# Patient Record
Sex: MALE | Race: WHITE | Employment: OTHER | URBAN - METROPOLITAN AREA
[De-identification: names, ages, dates, MRNs, and addresses within clinical notes are randomized per-mention and may not be internally consistent; named-entity substitution may affect disease eponyms.]

---

## 2021-02-20 ENCOUNTER — HOSPITAL ENCOUNTER (INPATIENT)
Age: 66
LOS: 3 days | Discharge: NURSING, REHAB OR LONG-TERM CARE | DRG: 065 | End: 2021-02-23
Attending: EMERGENCY MEDICINE | Admitting: INTERNAL MEDICINE
Payer: MEDICARE

## 2021-02-20 ENCOUNTER — APPOINTMENT (OUTPATIENT)
Dept: CT IMAGING | Age: 66
DRG: 065 | End: 2021-02-20
Payer: MEDICARE

## 2021-02-20 ENCOUNTER — APPOINTMENT (OUTPATIENT)
Dept: GENERAL RADIOLOGY | Age: 66
DRG: 065 | End: 2021-02-20
Payer: MEDICARE

## 2021-02-20 DIAGNOSIS — I63.231 CEREBROVASCULAR ACCIDENT (CVA) DUE TO STENOSIS OF RIGHT CAROTID ARTERY (HCC): Primary | ICD-10-CM

## 2021-02-20 PROBLEM — I63.9 ACUTE CVA (CEREBROVASCULAR ACCIDENT) (HCC): Status: ACTIVE | Noted: 2021-02-20

## 2021-02-20 LAB
ALBUMIN SERPL-MCNC: 3.9 G/DL (ref 3.5–5.2)
ALP BLD-CCNC: 89 U/L (ref 40–129)
ALT SERPL-CCNC: 28 U/L (ref 0–40)
ANION GAP SERPL CALCULATED.3IONS-SCNC: 10 MMOL/L (ref 7–16)
APTT: 24.9 SEC (ref 24.5–35.1)
AST SERPL-CCNC: 38 U/L (ref 0–39)
BILIRUB SERPL-MCNC: 0.6 MG/DL (ref 0–1.2)
BUN BLDV-MCNC: 13 MG/DL (ref 8–23)
CALCIUM SERPL-MCNC: 8.8 MG/DL (ref 8.6–10.2)
CHLORIDE BLD-SCNC: 104 MMOL/L (ref 98–107)
CO2: 26 MMOL/L (ref 22–29)
CREAT SERPL-MCNC: 1.3 MG/DL (ref 0.7–1.2)
GFR AFRICAN AMERICAN: >60
GFR NON-AFRICAN AMERICAN: 56 ML/MIN/1.73
GLUCOSE BLD-MCNC: 107 MG/DL (ref 74–99)
HCT VFR BLD CALC: 40.2 % (ref 37–54)
HEMOGLOBIN: 13.1 G/DL (ref 12.5–16.5)
INR BLD: 0.9
MCH RBC QN AUTO: 28.9 PG (ref 26–35)
MCHC RBC AUTO-ENTMCNC: 32.6 % (ref 32–34.5)
MCV RBC AUTO: 88.5 FL (ref 80–99.9)
METER GLUCOSE: 110 MG/DL (ref 74–99)
PDW BLD-RTO: 13.6 FL (ref 11.5–15)
PLATELET # BLD: 200 E9/L (ref 130–450)
PMV BLD AUTO: 11.3 FL (ref 7–12)
POTASSIUM SERPL-SCNC: 4 MMOL/L (ref 3.5–5)
PROTHROMBIN TIME: 10.4 SEC (ref 9.3–12.4)
RBC # BLD: 4.54 E12/L (ref 3.8–5.8)
SODIUM BLD-SCNC: 140 MMOL/L (ref 132–146)
TOTAL CK: 155 U/L (ref 20–200)
TOTAL PROTEIN: 7.6 G/DL (ref 6.4–8.3)
TROPONIN: <0.01 NG/ML (ref 0–0.03)
WBC # BLD: 7.2 E9/L (ref 4.5–11.5)

## 2021-02-20 PROCEDURE — 6360000004 HC RX CONTRAST MEDICATION: Performed by: RADIOLOGY

## 2021-02-20 PROCEDURE — 99284 EMERGENCY DEPT VISIT MOD MDM: CPT

## 2021-02-20 PROCEDURE — 4A03X5D MEASUREMENT OF ARTERIAL FLOW, INTRACRANIAL, EXTERNAL APPROACH: ICD-10-PCS | Performed by: PSYCHIATRY & NEUROLOGY

## 2021-02-20 PROCEDURE — 71045 X-RAY EXAM CHEST 1 VIEW: CPT

## 2021-02-20 PROCEDURE — 70498 CT ANGIOGRAPHY NECK: CPT

## 2021-02-20 PROCEDURE — 80053 COMPREHEN METABOLIC PANEL: CPT

## 2021-02-20 PROCEDURE — 0042T CT BRAIN PERFUSION: CPT

## 2021-02-20 PROCEDURE — 85610 PROTHROMBIN TIME: CPT

## 2021-02-20 PROCEDURE — 93005 ELECTROCARDIOGRAM TRACING: CPT | Performed by: EMERGENCY MEDICINE

## 2021-02-20 PROCEDURE — 85027 COMPLETE CBC AUTOMATED: CPT

## 2021-02-20 PROCEDURE — 0042T CT BRAIN PERFUSION: CPT | Performed by: RADIOLOGY

## 2021-02-20 PROCEDURE — 84484 ASSAY OF TROPONIN QUANT: CPT

## 2021-02-20 PROCEDURE — 85730 THROMBOPLASTIN TIME PARTIAL: CPT

## 2021-02-20 PROCEDURE — 2060000000 HC ICU INTERMEDIATE R&B

## 2021-02-20 PROCEDURE — 70498 CT ANGIOGRAPHY NECK: CPT | Performed by: RADIOLOGY

## 2021-02-20 PROCEDURE — 70496 CT ANGIOGRAPHY HEAD: CPT | Performed by: RADIOLOGY

## 2021-02-20 PROCEDURE — 2580000003 HC RX 258: Performed by: EMERGENCY MEDICINE

## 2021-02-20 PROCEDURE — 6370000000 HC RX 637 (ALT 250 FOR IP): Performed by: EMERGENCY MEDICINE

## 2021-02-20 PROCEDURE — 2580000003 HC RX 258: Performed by: RADIOLOGY

## 2021-02-20 PROCEDURE — 70450 CT HEAD/BRAIN W/O DYE: CPT

## 2021-02-20 PROCEDURE — 82962 GLUCOSE BLOOD TEST: CPT

## 2021-02-20 PROCEDURE — 99291 CRITICAL CARE FIRST HOUR: CPT | Performed by: PSYCHIATRY & NEUROLOGY

## 2021-02-20 PROCEDURE — 82550 ASSAY OF CK (CPK): CPT

## 2021-02-20 PROCEDURE — 70496 CT ANGIOGRAPHY HEAD: CPT

## 2021-02-20 RX ORDER — CLOPIDOGREL 300 MG/1
600 TABLET, FILM COATED ORAL ONCE
Status: COMPLETED | OUTPATIENT
Start: 2021-02-20 | End: 2021-02-20

## 2021-02-20 RX ORDER — METOPROLOL SUCCINATE 25 MG/1
25 TABLET, EXTENDED RELEASE ORAL DAILY
Status: ON HOLD | COMMUNITY
End: 2021-03-12 | Stop reason: SDUPTHER

## 2021-02-20 RX ORDER — ASPIRIN 81 MG/1
81 TABLET, CHEWABLE ORAL DAILY
Status: DISCONTINUED | OUTPATIENT
Start: 2021-02-21 | End: 2021-02-23 | Stop reason: HOSPADM

## 2021-02-20 RX ORDER — ACETAMINOPHEN 325 MG/1
650 TABLET ORAL EVERY 6 HOURS PRN
Status: DISCONTINUED | OUTPATIENT
Start: 2021-02-20 | End: 2021-02-23 | Stop reason: HOSPADM

## 2021-02-20 RX ORDER — SPIRONOLACTONE 50 MG/1
50 TABLET, FILM COATED ORAL DAILY
Status: ON HOLD | COMMUNITY
End: 2021-03-12 | Stop reason: SDUPTHER

## 2021-02-20 RX ORDER — CLOPIDOGREL BISULFATE 75 MG/1
75 TABLET ORAL DAILY
Status: DISCONTINUED | OUTPATIENT
Start: 2021-02-21 | End: 2021-02-23 | Stop reason: HOSPADM

## 2021-02-20 RX ORDER — SODIUM CHLORIDE 0.9 % (FLUSH) 0.9 %
10 SYRINGE (ML) INJECTION PRN
Status: DISCONTINUED | OUTPATIENT
Start: 2021-02-20 | End: 2021-02-23 | Stop reason: HOSPADM

## 2021-02-20 RX ORDER — METOPROLOL SUCCINATE 25 MG/1
25 TABLET, EXTENDED RELEASE ORAL DAILY
Status: DISCONTINUED | OUTPATIENT
Start: 2021-02-20 | End: 2021-02-23 | Stop reason: HOSPADM

## 2021-02-20 RX ORDER — LEVOTHYROXINE SODIUM 0.07 MG/1
75 TABLET ORAL DAILY
Status: DISCONTINUED | OUTPATIENT
Start: 2021-02-20 | End: 2021-02-23 | Stop reason: HOSPADM

## 2021-02-20 RX ORDER — LEVOTHYROXINE SODIUM 0.05 MG/1
75 TABLET ORAL DAILY
COMMUNITY

## 2021-02-20 RX ORDER — AMLODIPINE BESYLATE 5 MG/1
5 TABLET ORAL DAILY
Status: ON HOLD | COMMUNITY
End: 2021-03-12 | Stop reason: HOSPADM

## 2021-02-20 RX ORDER — SPIRONOLACTONE 25 MG/1
50 TABLET ORAL DAILY
Status: DISCONTINUED | OUTPATIENT
Start: 2021-02-20 | End: 2021-02-23 | Stop reason: HOSPADM

## 2021-02-20 RX ORDER — CLOPIDOGREL BISULFATE 75 MG/1
75 TABLET ORAL DAILY
COMMUNITY

## 2021-02-20 RX ORDER — SODIUM CHLORIDE 0.9 % (FLUSH) 0.9 %
10 SYRINGE (ML) INJECTION EVERY 12 HOURS SCHEDULED
Status: DISCONTINUED | OUTPATIENT
Start: 2021-02-20 | End: 2021-02-23 | Stop reason: HOSPADM

## 2021-02-20 RX ORDER — POTASSIUM CHLORIDE 20 MEQ/1
30 TABLET, EXTENDED RELEASE ORAL DAILY
COMMUNITY

## 2021-02-20 RX ORDER — SODIUM CHLORIDE 9 MG/ML
INJECTION, SOLUTION INTRAVENOUS CONTINUOUS
Status: DISCONTINUED | OUTPATIENT
Start: 2021-02-20 | End: 2021-02-20

## 2021-02-20 RX ORDER — ATORVASTATIN CALCIUM 40 MG/1
40 TABLET, FILM COATED ORAL EVERY EVENING
COMMUNITY

## 2021-02-20 RX ORDER — PROMETHAZINE HYDROCHLORIDE 25 MG/1
12.5 TABLET ORAL EVERY 6 HOURS PRN
Status: DISCONTINUED | OUTPATIENT
Start: 2021-02-20 | End: 2021-02-23 | Stop reason: HOSPADM

## 2021-02-20 RX ORDER — MESALAMINE 0.38 G/1
1.5 CAPSULE, EXTENDED RELEASE ORAL DAILY
COMMUNITY

## 2021-02-20 RX ORDER — ATORVASTATIN CALCIUM 40 MG/1
40 TABLET, FILM COATED ORAL NIGHTLY
Status: DISCONTINUED | OUTPATIENT
Start: 2021-02-20 | End: 2021-02-23 | Stop reason: HOSPADM

## 2021-02-20 RX ORDER — ONDANSETRON 2 MG/ML
4 INJECTION INTRAMUSCULAR; INTRAVENOUS EVERY 6 HOURS PRN
Status: DISCONTINUED | OUTPATIENT
Start: 2021-02-20 | End: 2021-02-23 | Stop reason: HOSPADM

## 2021-02-20 RX ORDER — ACETAMINOPHEN 650 MG/1
650 SUPPOSITORY RECTAL EVERY 6 HOURS PRN
Status: DISCONTINUED | OUTPATIENT
Start: 2021-02-20 | End: 2021-02-23 | Stop reason: HOSPADM

## 2021-02-20 RX ORDER — ASPIRIN 81 MG/1
324 TABLET, CHEWABLE ORAL ONCE
Status: COMPLETED | OUTPATIENT
Start: 2021-02-20 | End: 2021-02-20

## 2021-02-20 RX ORDER — POLYETHYLENE GLYCOL 3350 17 G/17G
17 POWDER, FOR SOLUTION ORAL DAILY PRN
Status: DISCONTINUED | OUTPATIENT
Start: 2021-02-20 | End: 2021-02-23 | Stop reason: HOSPADM

## 2021-02-20 RX ORDER — AMLODIPINE BESYLATE 5 MG/1
5 TABLET ORAL DAILY
Status: DISCONTINUED | OUTPATIENT
Start: 2021-02-20 | End: 2021-02-23 | Stop reason: HOSPADM

## 2021-02-20 RX ORDER — LOSARTAN POTASSIUM 50 MG/1
50 TABLET ORAL DAILY
Status: DISCONTINUED | OUTPATIENT
Start: 2021-02-20 | End: 2021-02-23 | Stop reason: HOSPADM

## 2021-02-20 RX ORDER — LOSARTAN POTASSIUM 50 MG/1
50 TABLET ORAL DAILY
Status: ON HOLD | COMMUNITY
End: 2021-03-12 | Stop reason: SDUPTHER

## 2021-02-20 RX ADMIN — ASPIRIN 324 MG: 81 TABLET, CHEWABLE ORAL at 12:50

## 2021-02-20 RX ADMIN — SODIUM CHLORIDE: 9 INJECTION, SOLUTION INTRAVENOUS at 12:51

## 2021-02-20 RX ADMIN — CLOPIDOGREL BISULFATE 600 MG: 300 TABLET, FILM COATED ORAL at 12:50

## 2021-02-20 RX ADMIN — IOPAMIDOL 100 ML: 755 INJECTION, SOLUTION INTRAVENOUS at 11:01

## 2021-02-20 RX ADMIN — SODIUM CHLORIDE, PRESERVATIVE FREE 10 ML: 5 INJECTION INTRAVENOUS at 11:01

## 2021-02-20 ASSESSMENT — ENCOUNTER SYMPTOMS
CHEST TIGHTNESS: 0
SHORTNESS OF BREATH: 0
ABDOMINAL PAIN: 0

## 2021-02-20 ASSESSMENT — PAIN SCALES - GENERAL: PAINLEVEL_OUTOF10: 0

## 2021-02-20 NOTE — H&P
THE HEAD WITHOUT CONTRAST  2021 10:59 am TECHNIQUE: CT of the head was performed without the administration of intravenous contrast. Dose modulation, iterative reconstruction, and/or weight based adjustment of the mA/kV was utilized to reduce the radiation dose to as low as reasonably achievable. COMPARISON: None. HISTORY: ORDERING SYSTEM PROVIDED HISTORY: Susan alert, left side weakness, prior stroke also TECHNOLOGIST PROVIDED HISTORY: Has a \"code stroke\" or \"stroke alert\" been called? ->Yes Reason for exam:->Susan alert, left side weakness, prior stroke also What reading provider will be dictating this exam?->CRC FINDINGS: BRAIN/VENTRICLES: There is no acute intracranial hemorrhage, mass effect or midline shift. No abnormal extra-axial fluid collection. The gray-white differentiation is maintained without evidence of an acute infarct. Demonstrated is generalized parenchymal volume loss with prominence of ventricles and sulci. There is some hypodensity in the supratentorial white matter most consistent with chronic small vessel ischemic change. ORBITS: The visualized portion of the orbits demonstrate no acute abnormality. SINUSES: The visualized paranasal sinuses and mastoid air cells demonstrate no acute abnormality. SOFT TISSUES/SKULL:  No acute abnormality of the visualized skull or soft tissues. Parenchymal volume loss which seems disproportionate for age In chronic small vessel ischemic change which which RECOMMENDATIONS: Findings discussed with referring physician following this report    Cta Neck W Contrast    Result Date: 2021  Patient MRN:  [id number] : 1955 Age: 60 years Gender: Male Order Date:  2021 10:59 AM EXAM: CTA NECK W CONTRAST, CTA HEAD W CONTRAST NUMBER OF IMAGES:  [id number] INDICATION:  susan alert susan alert COMPARISON: None Technique: Low-dose CT  acquisition technique included one of following options; 1 . Automated exposure control, 2.  Adjustment of MA and or KV according to patient's size or 3. Use of iterative reconstruction. Contiguous spiral images were obtained in the axial plane, following the administration of intravenous contrast using CT angiographic protocol. Sagittal and coronal images were reconstructed from the axial plane acquisition. Additional MIP reconstructions were presented to aid in the interpretation of this study. Images were obtained from the skull base cranially. There is moderate calcified plaque identified in the vessels compatible with atherosclerotic disease. There is aortic arch and great vessels demonstrate moderate atherosclerotic disease. The right carotid is abnormal. There is no evidence for hemodynamically significant stenosis at the level the proximal internal carotid artery. By NASCET criteria estimated stenosis is 50% or less The left carotid is abnormal. There is no evidence for hemodynamically significant stenosis at the level the proximal internal carotid artery. By NASCET criteria estimated stenosis is 50% or less The right vertebral artery is unremarkable. The left vertebral artery is unremarkable. The basilar artery is unremarkable. The middle cerebral arteries are unremarkable. The anterior cerebral arteries are abnormal. At the level of the distal A3 segment of the right pericallosal artery there appears to be evidence to suggest stenosis. The posterior cerebral arteries are unremarkable. There is evidence for hydrocephalus    1. Moderate atherosclerotic disease . Probable right A 3 stenosis.  2. Estimated stenosis of the proximal right and left internal carotid artery by NASCET criteria is not hemodynamically significant    Ct Brain Perfusion    Result Date: 2021  Patient MRN: [id number] : 1955 Age:  60 years Gender: Male Order Date: 2021 10:59 AM Exam: CT BRAIN PERFUSION Number of Images: 211 views Indication:   katiuska alert, left weakness, prior left weakness now worse katiuska alert, left weakness, prior left weakness now worse Decision Support Exception->Emergency Medical Condition (MA) Comparison: None. Findings: Perfusion images demonstrate symmetric blood volume Blood flow images demonstrate asymmetric blood flow There is a small region of ischemic penumbra in the distribution of the distal right anterior cerebral artery There is no significant core infarct identified. Small region of ischemic penumbra in the distribution of the distal right anterior cerebral artery    Cta Head W Contrast    Result Date: 2021  Patient MRN:  [id number] : 1955 Age: 60 years Gender: Male Order Date:  2021 10:59 AM EXAM: CTA NECK W CONTRAST, CTA HEAD W CONTRAST NUMBER OF IMAGES:  [id number] INDICATION:  katiuska alert katiuska alert COMPARISON: None Technique: Low-dose CT  acquisition technique included one of following options; 1 . Automated exposure control, 2. Adjustment of MA and or KV according to patient's size or 3. Use of iterative reconstruction. Contiguous spiral images were obtained in the axial plane, following the administration of intravenous contrast using CT angiographic protocol. Sagittal and coronal images were reconstructed from the axial plane acquisition. Additional MIP reconstructions were presented to aid in the interpretation of this study. Images were obtained from the skull base cranially. There is moderate calcified plaque identified in the vessels compatible with atherosclerotic disease. There is aortic arch and great vessels demonstrate moderate atherosclerotic disease. The right carotid is abnormal. There is no evidence for hemodynamically significant stenosis at the level the proximal internal carotid artery. By NASCET criteria estimated stenosis is 50% or less The left carotid is abnormal. There is no evidence for hemodynamically significant stenosis at the level the proximal internal carotid artery. By NASCET criteria estimated stenosis is 50% or less The right vertebral artery is unremarkable.  The left vertebral

## 2021-02-20 NOTE — ED PROVIDER NOTES
71-year-old obese male presenting from his daughter's house with left-sided weakness. The called to the house was for weakness and the patient being stuck on the ground. He lives in [place], is visiting his daughter here locally. Try to get out of bed this morning, slid out of bed and was stuck between the bed and the dresser. Could not get up on his own. History of stroke several years ago with residual left-sided weakness. However, today the concern is that the weakness is definitely worse. Slight facial droop on the left-hand side but otherwise fully oriented. He says on Plavix, denies any other blood thinning medications. This was sudden onset, last known well was about 1 AM when he went to bed. This has been persistent, steady, moderate to severe, unspecified duration as it was symptomatic from the time he woke up. Known prior stroke with residual left-sided weakness that is now felt to be worse. History reviewed. No pertinent family history. History reviewed. No pertinent surgical history. Review of Systems   Constitutional: Negative for chills and fever. Respiratory: Negative for chest tightness and shortness of breath. Cardiovascular: Negative for chest pain. Gastrointestinal: Negative for abdominal pain. Neurological: Positive for facial asymmetry, weakness and numbness. Negative for seizures. All other systems reviewed and are negative. Physical Exam  Constitutional:       General: He is not in acute distress. Appearance: He is well-developed. He is obese. HENT:      Head: Normocephalic and atraumatic. Eyes:      Pupils: Pupils are equal, round, and reactive to light. Neck:      Musculoskeletal: Normal range of motion and neck supple. Thyroid: No thyromegaly. Cardiovascular:      Rate and Rhythm: Normal rate and regular rhythm. Pulmonary:      Effort: Pulmonary effort is normal. No respiratory distress.       Breath sounds: Normal breath sounds. No wheezing. Abdominal:      General: There is no distension. Palpations: Abdomen is soft. There is no mass. Tenderness: There is no abdominal tenderness. There is no guarding or rebound. Musculoskeletal: Normal range of motion. General: No tenderness. Skin:     General: Skin is warm and dry. Findings: No erythema. Neurological:      Mental Status: He is alert and oriented to person, place, and time. GCS: GCS eye subscore is 4. GCS verbal subscore is 5. GCS motor subscore is 6. Cranial Nerves: Facial asymmetry present. No cranial nerve deficit. Sensory: Sensory deficit present. Motor: Weakness present. Procedures     MDM     NIH Stroke Scale/Score at time of initial evaluation: 1055  1A: Level of Consciousness 0 - alert; keenly responsive   1B: Ask Month and Age 0 - answers both questions correctly   1C:  Tell Patient To Open and Close Eyes, then Hand  Squeeze 0 - performs both tasks correctly   2: Test Horizontal Extraocular Movements 0 - normal   3: Test Visual Fields 0 - no visual loss   4: Test Facial Palsy 1 - minor paralysis (flattened nasolabial fold, asymmetric on smiling)   5A: Test Left Arm Motor Drift 3 - no effort against gravity, limb falls   5B: Test Right Arm Motor Drift 0 - no drift, limb holds 90 (or 45) degrees for full 10 seconds   6A: Test Left Leg Motor Drift 1 - drift; leg falls by the end of the 5 second period but does not hit bed   6B: Test Right Leg Motor Drift 0 - no drift; leg holds 30 degree position for full 5 seconds   7: Test Limb Ataxia   (FNF/Heel-Shin) 2 - present in two limbs   8: Test Sensation 1 - mild to moderate sensory loss; patient feels pinprick is less sharp or is dull on the affected side; there is a loss of superficial pain with pinprick but patient is aware of being touched    9: Test Language/Aphasia 0 - no aphasia, normal   10: Test Dysarthria 1 - mild to moderate, patient slurs at least some words and at worst, can be understood with some difficulty   11: Test Extinction/Inattention 0 - no abnormality   Total 9          ED Course as of Feb 20 1917   Sat Feb 20, 2021   1130 No acute abnormalities seen in the head CT without contrast.  There is significant chronic microvascular disease out of proportion to his age.    [SO]   56 Spoke with Dr. Noris Hussein, he will discuss the findings with the patient. There is a possibility of a stent in the right carotid based on the significant disease that is present there. Otherwise, intracranially there are no significant acute abnormalities. [SO]   1150 Waiting to hear back from family.    [SO]   [id number] [phone] Patient updated regarding the possible intervention with stent to the right carotid. Setting of the machine so Dr. Noris Hussein can speak with the patient.    [SO]   1200 Dr. Noris Hussein had a long conversation with the patient over the telestroke machine. Patient was given options regarding medical management or a stent in the right carotid to improve blood flow. Patient said he would want medical management and did not want an intervention or procedure at this time. He may choose further intervention once he is back in [place]. Patient will be admitted the hospital.  Per recommendations patient be given aspirin and 6 mg of Plavix, IV fluids and admitted to the hospital.    [SO]   [phone] with Dr. Karen Pinto who will accept the patient to the hospital.    [SO]      ED Course User Index  [SO] Irving Hair, DO       --------------------------------------------- PAST HISTORY ---------------------------------------------  Past Medical History:  has no past medical history on file. Past Surgical History:  has no past surgical history on file. Social History:  reports that he has never smoked. He has never used smokeless tobacco. He reports previous drug use. Family History: family history is not on file. The patients home medications have been reviewed.     Allergies: Patient has no known allergies. -------------------------------------------------- RESULTS -------------------------------------------------    Lab  Results for orders placed or performed during the hospital encounter of 02/20/21   CBC   Result Value Ref Range    WBC 7.2 4.5 - 11.5 E9/L    RBC 4.54 3.80 - 5.80 E12/L    Hemoglobin 13.1 12.5 - 16.5 g/dL    Hematocrit 40.2 37.0 - 54.0 %    MCV 88.5 80.0 - 99.9 fL    MCH 28.9 26.0 - 35.0 pg    MCHC 32.6 32.0 - 34.5 %    RDW 13.6 11.5 - 15.0 fL    Platelets 342 367 - 630 E9/L    MPV 11.3 7.0 - 12.0 fL   Comprehensive metabolic panel   Result Value Ref Range    Sodium 140 132 - 146 mmol/L    Potassium 4.0 3.5 - 5.0 mmol/L    Chloride 104 98 - 107 mmol/L    CO2 26 22 - 29 mmol/L    Anion Gap 10 7 - 16 mmol/L    Glucose 107 (H) 74 - 99 mg/dL    BUN 13 8 - 23 mg/dL    CREATININE 1.3 (H) 0.7 - 1.2 mg/dL    GFR Non-African American 56 >=60 mL/min/1.73    GFR African American >60     Calcium 8.8 8.6 - 10.2 mg/dL    Total Protein 7.6 6.4 - 8.3 g/dL    Albumin 3.9 3.5 - 5.2 g/dL    Total Bilirubin 0.6 0.0 - 1.2 mg/dL    Alkaline Phosphatase 89 40 - 129 U/L    ALT 28 0 - 40 U/L    AST 38 0 - 39 U/L   Troponin   Result Value Ref Range    Troponin <0.01 0.00 - 0.03 ng/mL   CK   Result Value Ref Range    Total  20 - 200 U/L   Protime-INR   Result Value Ref Range    Protime 10.4 9.3 - 12.4 sec    INR 0.9    APTT   Result Value Ref Range    aPTT 24.9 24.5 - 35.1 sec   POCT Glucose   Result Value Ref Range    Meter Glucose 110 (H) 74 - 99 mg/dL   EKG 12 Lead   Result Value Ref Range    Ventricular Rate 68 BPM    Atrial Rate 68 BPM    P-R Interval 160 ms    QRS Duration 86 ms    Q-T Interval 418 ms    QTc Calculation (Bazett) 444 ms    R Axis 2 degrees    T Axis 9 degrees       Radiology  CTA HEAD W CONTRAST   Final Result   1. Moderate atherosclerotic disease . Probable right A 3 stenosis.    2. Estimated stenosis of the proximal right and left internal carotid   artery by NASCET criteria is not hemodynamically significant      CTA NECK W CONTRAST   Final Result   1. Moderate atherosclerotic disease . Probable right A 3 stenosis. 2. Estimated stenosis of the proximal right and left internal carotid   artery by NASCET criteria is not hemodynamically significant      CT BRAIN PERFUSION   Final Result   Small region of ischemic penumbra in the distribution of the distal   right anterior cerebral artery      XR CHEST PORTABLE   Final Result   Normal chest      CT HEAD WO CONTRAST   Final Result   Parenchymal volume loss which seems disproportionate for age   In chronic small vessel ischemic change which which   RECOMMENDATIONS:   Findings discussed with referring physician following this report      MRI BRAIN WO CONTRAST    (Results Pending)       ------------------------- NURSING NOTES AND VITALS REVIEWED ---------------------------  Date / Time Roomed:  2/20/2021 10:49 AM  ED Bed Assignment:  22/22    The nursing notes within the ED encounter and vital signs as below have been reviewed. Patient Vitals for the past 24 hrs:   BP Temp Temp src Pulse Resp SpO2 Height Weight   02/20/21 1730 (!) 167/95   67 19 97 %     02/20/21 1324 (!) 166/92   69 21 98 %     02/20/21 1055 (!) 168/88          02/20/21 1054  97 °F (36.1 °C) Temporal   96 % 5' 9\" (1.753 m) (!) 320 lb (145.2 kg)   02/20/21 1052 (!) 156/103          02/20/21 1051    81 20          Oxygen Saturation Interpretation: Normal      ------------------------------------------ PROGRESS NOTES ------------------------------------------  Re-evaluation(s):    Patients symptoms show no change  Repeat physical examination is not changed    Patients symptoms show no change  Repeat physical examination is not changed    I have spoken with the patient and discussed todays results, in addition to providing specific details for the plan of care and counseling regarding the diagnosis and prognosis.   Their questions are answered at this time and they are agreeable with the plan.      --------------------------------- ADDITIONAL PROVIDER NOTES ---------------------------------  Consultations:  Spoke with Dr. Divya Siu who will consult on the patient. Spoke with admitting team,  They will admit this patient. This patient's ED course included: a personal history and physicial examination, re-evaluation prior to disposition, multiple bedside re-evaluations, IV medications, cardiac monitoring, continuous pulse oximetry and complex medical decision making and emergency management    This patient has remained hemodynamically stable, remained unchanged and been closely monitored during their ED course. Please note that the withdrawal or failure to initiate urgent interventions for this patient would likely result in a life threatening deterioration or permanent disability. Accordingly this patient received 45 minutes of critical care time, excluding separately billable procedures. Systems at risk for deterioration include: neurologic, cardiovascular. Clinical Impression  1. Cerebrovascular accident (CVA) due to stenosis of right carotid artery ([facility] [address]          Disposition  Patient's disposition: Admit to telemetry  Patient's condition is stable.        Wilfredo Amin DO  02/20/21 0090

## 2021-02-20 NOTE — VIRTUAL HEALTH
Consults  Patient Location:  [address] [place] [address] Emergency Department    Provider Location (City/State): [facility]      This virtual visit was ED - telestroke consult, for inpatient care please consult Neurology on call-  for this week      This virtual visit was conducted via interactive/real-time audio/video. Stroke Neurology Consult Note  2/20/2021 12:00 PM  Pt Name: Africa Bansal  MRN: [id number]  YOB: 1955  Date of evaluation: 2/20/2021  Primary Care Physician: No primary care provider on file. Stroke referral received from Tigre Manual, DO based upon patient's presenting stroke like symptomology. Africa Bansal is a 60 y.o. male who presents with acute worsening of left sided weakness. He is from [facility] and he reportedly woke up with these symptoms hence is a DERRICK alert. Last Known to be Well (Stroke Diagnosis)  Date Last Known Well: 02/19/21  Time Last Known Well: 0100    Prior Functional Status(Modified Avery Scale): 2=Slight disability; unable to carry out all previous activities, but able to look after own affairs without assistance    No Known Allergies    Medications  Prior to Admission medications    Not on File         History reviewed. No pertinent past medical history. History reviewed. No pertinent surgical history.   Social History     Socioeconomic History    Marital status: Single     Spouse name: Not on file    Number of children: Not on file    Years of education: Not on file    Highest education level: Not on file   Occupational History    Not on file   Social Needs    Financial resource strain: Not on file    Food insecurity     Worry: Not on file     Inability: Not on file    Transportation needs     Medical: Not on file     Non-medical: Not on file   Tobacco Use    Smoking status: Never Smoker    Smokeless tobacco: Never Used   Substance and Sexual Activity    Alcohol use: Not on file    Drug use: Not Currently    Sexual activity: Not on file   Lifestyle    Physical activity     Days per week: Not on file     Minutes per session: Not on file    Stress: Not on file   Relationships    Social connections     Talks on phone: Not on file     Gets together: Not on file     Attends Taoist service: Not on file     Active member of club or organization: Not on file     Attends meetings of clubs or organizations: Not on file     Relationship status: Not on file    Intimate partner violence     Fear of current or ex partner: Not on file     Emotionally abused: Not on file     Physically abused: Not on file     Forced sexual activity: Not on file   Other Topics Concern    Not on file   Social History Narrative    Not on file       OBJECTIVE  Vitals:    02/20/21 1051 02/20/21 1052 02/20/21 1054 02/20/21 1055   BP:  (!) 156/103  (!) 168/88   Pulse: 81      Resp: 20      Temp:   97 °F (36.1 °C)    TempSrc:   Temporal    SpO2:   96%    Weight:   (!) 320 lb (145.2 kg)    Height:   5' 9\" (1.753 m)      NIH Stroke Scale at time of initial evaluation:        NIH Stroke Scale/Score at time of initial evaluation:    1A: Level of Consciousness 0 - alert; keenly responsive   1B: Ask Month and Age 0 - answers both questions correctly   1C:  Tell Patient To Open and Close Eyes, then Hand  Squeeze 0 - performs both tasks correctly   2: Test Horizontal Extraocular Movements 0 - normal   3: Test Visual Fields 0 - no visual loss   4: Test Facial Palsy 1 - minor paralysis (flattened nasolabial fold, asymmetric on smiling)   5A: Test Left Arm Motor Drift 4 - no movement   5B: Test Right Arm Motor Drift 0 - no drift, limb holds 90 (or 45) degrees for full 10 seconds   6A: Test Left Leg Motor Drift 3 - no effort against gravity; leg falls to bed immediately   6B: Test Right Leg Motor Drift 0 - no drift; leg holds 30 degree position for full 5 seconds   7: Test Limb Ataxia (FNF/Heel-Shin) 0 - absent   8: Test Sensation 0

## 2021-02-21 LAB
ALBUMIN SERPL-MCNC: 4 G/DL (ref 3.5–5.2)
ALP BLD-CCNC: 91 U/L (ref 40–129)
ALT SERPL-CCNC: 26 U/L (ref 0–40)
ANION GAP SERPL CALCULATED.3IONS-SCNC: 12 MMOL/L (ref 7–16)
ANION GAP SERPL CALCULATED.3IONS-SCNC: 16 MMOL/L (ref 7–16)
AST SERPL-CCNC: 28 U/L (ref 0–39)
BILIRUB SERPL-MCNC: 0.8 MG/DL (ref 0–1.2)
BUN BLDV-MCNC: 12 MG/DL (ref 8–23)
BUN BLDV-MCNC: 8 MG/DL (ref 8–23)
CALCIUM SERPL-MCNC: 8.3 MG/DL (ref 8.6–10.2)
CALCIUM SERPL-MCNC: 8.7 MG/DL (ref 8.6–10.2)
CHLORIDE BLD-SCNC: 101 MMOL/L (ref 98–107)
CHLORIDE BLD-SCNC: 99 MMOL/L (ref 98–107)
CHOLESTEROL, TOTAL: 137 MG/DL (ref 0–199)
CO2: 24 MMOL/L (ref 22–29)
CO2: 27 MMOL/L (ref 22–29)
CREAT SERPL-MCNC: 1.3 MG/DL (ref 0.7–1.2)
CREAT SERPL-MCNC: 1.4 MG/DL (ref 0.7–1.2)
EKG ATRIAL RATE: 68 BPM
EKG P-R INTERVAL: 160 MS
EKG Q-T INTERVAL: 418 MS
EKG QRS DURATION: 86 MS
EKG QTC CALCULATION (BAZETT): 444 MS
EKG R AXIS: 2 DEGREES
EKG T AXIS: 9 DEGREES
EKG VENTRICULAR RATE: 68 BPM
GFR AFRICAN AMERICAN: >60
GFR AFRICAN AMERICAN: >60
GFR NON-AFRICAN AMERICAN: 51 ML/MIN/1.73
GFR NON-AFRICAN AMERICAN: 56 ML/MIN/1.73
GLUCOSE BLD-MCNC: 123 MG/DL (ref 74–99)
GLUCOSE BLD-MCNC: 149 MG/DL (ref 74–99)
HBA1C MFR BLD: 5.5 % (ref 4–5.6)
HCT VFR BLD CALC: 39 % (ref 37–54)
HDLC SERPL-MCNC: 38 MG/DL
HEMOGLOBIN: 13 G/DL (ref 12.5–16.5)
LDL CHOLESTEROL CALCULATED: 68 MG/DL (ref 0–99)
MAGNESIUM: 1.9 MG/DL (ref 1.6–2.6)
MCH RBC QN AUTO: 29 PG (ref 26–35)
MCHC RBC AUTO-ENTMCNC: 33.3 % (ref 32–34.5)
MCV RBC AUTO: 87.1 FL (ref 80–99.9)
PDW BLD-RTO: 13.6 FL (ref 11.5–15)
PHOSPHORUS: 2.4 MG/DL (ref 2.5–4.5)
PLATELET # BLD: 187 E9/L (ref 130–450)
PMV BLD AUTO: 11.6 FL (ref 7–12)
POTASSIUM SERPL-SCNC: 2.4 MMOL/L (ref 3.5–5)
POTASSIUM SERPL-SCNC: 2.7 MMOL/L (ref 3.5–5)
RBC # BLD: 4.48 E12/L (ref 3.8–5.8)
SODIUM BLD-SCNC: 139 MMOL/L (ref 132–146)
SODIUM BLD-SCNC: 140 MMOL/L (ref 132–146)
TOTAL PROTEIN: 7.3 G/DL (ref 6.4–8.3)
TRIGL SERPL-MCNC: 155 MG/DL (ref 0–149)
TSH SERPL DL<=0.05 MIU/L-ACNC: 2.17 UIU/ML (ref 0.27–4.2)
VLDLC SERPL CALC-MCNC: 31 MG/DL
WBC # BLD: 8.3 E9/L (ref 4.5–11.5)

## 2021-02-21 PROCEDURE — 84100 ASSAY OF PHOSPHORUS: CPT

## 2021-02-21 PROCEDURE — 6360000002 HC RX W HCPCS: Performed by: INTERNAL MEDICINE

## 2021-02-21 PROCEDURE — 2580000003 HC RX 258: Performed by: INTERNAL MEDICINE

## 2021-02-21 PROCEDURE — 80048 BASIC METABOLIC PNL TOTAL CA: CPT

## 2021-02-21 PROCEDURE — 83735 ASSAY OF MAGNESIUM: CPT

## 2021-02-21 PROCEDURE — 97165 OT EVAL LOW COMPLEX 30 MIN: CPT

## 2021-02-21 PROCEDURE — 6370000000 HC RX 637 (ALT 250 FOR IP): Performed by: INTERNAL MEDICINE

## 2021-02-21 PROCEDURE — 2060000000 HC ICU INTERMEDIATE R&B

## 2021-02-21 PROCEDURE — 84443 ASSAY THYROID STIM HORMONE: CPT

## 2021-02-21 PROCEDURE — 97161 PT EVAL LOW COMPLEX 20 MIN: CPT | Performed by: PHYSICAL THERAPIST

## 2021-02-21 PROCEDURE — 97530 THERAPEUTIC ACTIVITIES: CPT

## 2021-02-21 PROCEDURE — 83036 HEMOGLOBIN GLYCOSYLATED A1C: CPT

## 2021-02-21 PROCEDURE — 85027 COMPLETE CBC AUTOMATED: CPT

## 2021-02-21 PROCEDURE — 93010 ELECTROCARDIOGRAM REPORT: CPT | Performed by: INTERNAL MEDICINE

## 2021-02-21 PROCEDURE — 97530 THERAPEUTIC ACTIVITIES: CPT | Performed by: PHYSICAL THERAPIST

## 2021-02-21 PROCEDURE — 36415 COLL VENOUS BLD VENIPUNCTURE: CPT

## 2021-02-21 PROCEDURE — 99223 1ST HOSP IP/OBS HIGH 75: CPT | Performed by: PHYSICIAN ASSISTANT

## 2021-02-21 PROCEDURE — 80061 LIPID PANEL: CPT

## 2021-02-21 PROCEDURE — 80053 COMPREHEN METABOLIC PANEL: CPT

## 2021-02-21 RX ORDER — POTASSIUM CHLORIDE 7.45 MG/ML
10 INJECTION INTRAVENOUS PRN
Status: DISCONTINUED | OUTPATIENT
Start: 2021-02-21 | End: 2021-02-21

## 2021-02-21 RX ORDER — POTASSIUM CHLORIDE 20 MEQ/1
40 TABLET, EXTENDED RELEASE ORAL PRN
Status: DISCONTINUED | OUTPATIENT
Start: 2021-02-21 | End: 2021-02-21

## 2021-02-21 RX ORDER — MESALAMINE 400 MG/1
800 CAPSULE, DELAYED RELEASE ORAL 3 TIMES DAILY
Status: DISCONTINUED | OUTPATIENT
Start: 2021-02-21 | End: 2021-02-23 | Stop reason: HOSPADM

## 2021-02-21 RX ORDER — POTASSIUM CHLORIDE 20 MEQ/1
40 TABLET, EXTENDED RELEASE ORAL
Status: COMPLETED | OUTPATIENT
Start: 2021-02-21 | End: 2021-02-22

## 2021-02-21 RX ORDER — POTASSIUM CHLORIDE 20 MEQ/1
40 TABLET, EXTENDED RELEASE ORAL
Status: DISCONTINUED | OUTPATIENT
Start: 2021-02-21 | End: 2021-02-21

## 2021-02-21 RX ADMIN — POTASSIUM CHLORIDE 40 MEQ: 1500 TABLET, EXTENDED RELEASE ORAL at 21:43

## 2021-02-21 RX ADMIN — Medication 10 ML: at 01:19

## 2021-02-21 RX ADMIN — ENOXAPARIN SODIUM 40 MG: 40 INJECTION SUBCUTANEOUS at 09:48

## 2021-02-21 RX ADMIN — POTASSIUM CHLORIDE 30 MEQ: 1500 TABLET, EXTENDED RELEASE ORAL at 09:48

## 2021-02-21 RX ADMIN — SERTRALINE 50 MG: 50 TABLET, FILM COATED ORAL at 09:48

## 2021-02-21 RX ADMIN — POTASSIUM CHLORIDE 10 MEQ: 10 INJECTION, SOLUTION INTRAVENOUS at 15:50

## 2021-02-21 RX ADMIN — SPIRONOLACTONE 50 MG: 25 TABLET ORAL at 09:49

## 2021-02-21 RX ADMIN — Medication 10 ML: at 21:47

## 2021-02-21 RX ADMIN — Medication 10 ML: at 09:48

## 2021-02-21 RX ADMIN — POTASSIUM CHLORIDE 10 MEQ: 10 INJECTION, SOLUTION INTRAVENOUS at 18:19

## 2021-02-21 RX ADMIN — MESALAMINE 800 MG: 400 CAPSULE, DELAYED RELEASE ORAL at 21:43

## 2021-02-21 RX ADMIN — LEVOTHYROXINE SODIUM 75 MCG: 0.07 TABLET ORAL at 06:01

## 2021-02-21 RX ADMIN — CLOPIDOGREL 75 MG: 75 TABLET, FILM COATED ORAL at 09:49

## 2021-02-21 RX ADMIN — MESALAMINE 800 MG: 400 CAPSULE, DELAYED RELEASE ORAL at 18:19

## 2021-02-21 RX ADMIN — POTASSIUM CHLORIDE 10 MEQ: 10 INJECTION, SOLUTION INTRAVENOUS at 13:21

## 2021-02-21 RX ADMIN — POTASSIUM CHLORIDE 10 MEQ: 10 INJECTION, SOLUTION INTRAVENOUS at 14:45

## 2021-02-21 RX ADMIN — POTASSIUM CHLORIDE 10 MEQ: 10 INJECTION, SOLUTION INTRAVENOUS at 16:58

## 2021-02-21 RX ADMIN — ATORVASTATIN CALCIUM 40 MG: 40 TABLET, FILM COATED ORAL at 21:43

## 2021-02-21 RX ADMIN — ASPIRIN 81 MG: 81 TABLET, CHEWABLE ORAL at 09:49

## 2021-02-21 ASSESSMENT — PAIN SCALES - GENERAL: PAINLEVEL_OUTOF10: 0

## 2021-02-21 NOTE — PROGRESS NOTES
Hospitalist Progress Note      SYNOPSIS: Patient admitted on 2021 presented to the ER with complaint of left-sided weakness. He fell out of bed and was stuck on the ground. He normally lives in [place] but is visiting his daughter locally. He could not get up this morning and could not get up on his own. He does have a history of stroke several years ago. He complains of worse weakness of the left upper extremity. Last known well was the night prior when he went to bed. He normally is on Plavix at home for prior stroke. Perfusion CT showed small region of ischemic penumbra in the distribution of the distal right anterior cerebral artery. CT of the head also showing parenchymal volume loss which seems disproportionate for age and small vessel changes. Neurology consulted from the ER. tPA was not given due to being out of the timeframe. Carotid reperfusion therapy was discussed with the patient along with medical management and he chose medical management and wished to go back to [place] for care there. He received aspirin and Plavix load       SUBJECTIVE:  Stable overnight. No other overnight issues reported. Patient seen and examined  Records reviewed. Still with flaccid L arm  Did poorly with PT 10/24  He originally wanted to treat this stroke medically only, no procedures. Now he wants to hear about procedure with neuro interventionalist. He is out of time window  I have told him deficits may not improve      Temp (24hrs), Av.1 °F (36.7 °C), Min:97.2 °F (36.2 °C), Max:99.1 °F (37.3 °C)    DIET: DIET GENERAL;  CODE: Full Code    Intake/Output Summary (Last 24 hours) at 2021 1236  Last data filed at 2021 1134  Gross per 24 hour   Intake    Output 1000 ml   Net -1000 ml       Review of Systems  All bolded are positive; please see HPI  General:  Fever, chills, diaphoresis, fatigue, malaise, night sweats, weight loss  Psychological:  Anxiety, disorientation, hallucinations. ENT:  Epistaxis, headaches, vertigo, visual changes. Cardiovascular:  Chest pain, irregular heartbeats, palpitations, paroxysmal nocturnal dyspnea. Respiratory:  Shortness of breath, coughing, sputum production, hemoptysis, wheezing, orthopnea. Gastrointestinal:  Nausea, vomiting, diarrhea, heartburn, constipation, abdominal pain, hematemesis, hematochezia, melena, acholic stools  Genito-Urinary:  Dysuria, urgency, frequency, hematuria  Musculoskeletal:  Joint pain, joint stiffness, joint swelling, muscle pain  Neurology:  Headache, focal neurological deficits, weakness, numbness, paresthesia  Derm:  Rashes, ulcers, excoriations, bruising  Extremities:  Decreased ROM, peripheral edema, mottling      OBJECTIVE:    BP (!) 187/94   Pulse 70   Temp 97.2 °F (36.2 °C) (Temporal)   Resp 20   Ht 5' 9\" (1.753 m)   Wt (!) 318 lb (144.2 kg)   SpO2 91%   BMI 46.96 kg/m²     General Appearance:  awake, alert, and oriented to person, place, time, and purpose; appears stated age and cooperative; no apparent distress no labored breathing  HEENT:  NCAT; PERRL; conjunctiva pink, sclera clear minor facial droop  Neck:  no adenopathy, bruit, JVD, tenderness, masses, or nodules; supple, symmetrical, trachea midline, thyroid not enlarged  Lung:  clear to auscultation bilaterally; no use of accessory muscles; no rhonchi, rales, or crackles  Heart:  regular rate and regular rhythm without murmur, rub, or gallop  Abdomen:  soft, nontender, nondistended; normoactive bowel sounds; no organomegaly  Extremities:  extremities normal, atraumatic, no cyanosis or edema   Musculokeletal:  no joint swelling, no muscle tenderness. ROM normal in all joints of extremities. Neurologic:  mental status A&Ox3, thought content appropriate; CN II-XII grossly intact; sensation intact,  ; no slurred speech left upper extremity flaccid.   Left lower leg weak cannot hold against gravity    ASSESSMENT and PLAN: · Acute ischemic CVA- Plavix. Statin. MRI brain was ordered however he is unable to fit. Echocardiogram. Lipid panel reviewed. Neuro checks q4 hours. Bedside swallow evaluation Speech therapy consult. Did poorly with PT 10/24. DVT prophylaxis. Permissive hypertension unless BP >220/120 or pt symptomatic (home medications being restarted slowing). Consult neurology. Patient did not wish to have reperfusion therapy, chose medical management, wants to go back to [place] for further workup. However now he is asking about procedure after he did poorly with PT.   · Non insulin dependent diabetes mellitus-Metformin on hold due to intolerance. Continue to monitor blood glucose. · Hypertension  · Hyperlipidemia  · Hypothyroidism  · Hypokalemia- patient has had intermittent diarrhea over the past 2 weeks. Replace and monitor.  Repeat BMP this afternoon  · History of oral cancer and radiation-this sometimes causes swallowing dysfunction         DISPOSITION: Continue current plan of care    Medications:  REVIEWED DAILY    Infusion Medications   Scheduled Medications    aspirin  81 mg Oral Daily    clopidogrel  75 mg Oral Daily    atorvastatin  40 mg Oral Nightly    sodium chloride flush  10 mL Intravenous 2 times per day    enoxaparin  40 mg Subcutaneous Daily    amLODIPine  5 mg Oral Daily    levothyroxine  75 mcg Oral Daily    [Held by provider] losartan  50 mg Oral Daily    [Held by provider] metoprolol succinate  25 mg Oral Daily    sertraline  50 mg Oral Daily    spironolactone  50 mg Oral Daily    potassium chloride  30 mEq Oral Daily     PRN Meds: potassium chloride **OR** potassium alternative oral replacement **OR** potassium chloride, sodium chloride flush, perflutren lipid microspheres, sodium chloride flush, promethazine **OR** ondansetron, polyethylene glycol, acetaminophen **OR** acetaminophen    Labs:     Recent Labs     02/20/21  1057 02/21/21  0938   WBC 7.2 8.3   HGB 13.1 13.0   HCT 40.2 39.0  187       Recent Labs     02/20/21  1057 02/21/21  0938    139   K 4.0 2.4*    99   CO2 26 24   BUN 13 8   CREATININE 1.3* 1.3*   CALCIUM 8.8 8.7   PHOS  --  2.4*       Recent Labs     02/20/21  1057 02/21/21  0938   PROT 7.6 7.3   ALKPHOS 89 91   ALT 28 26   AST 38 28   BILITOT 0.6 0.8       Recent Labs     02/20/21  1057   INR 0.9       Recent Labs     02/20/21  1057   CKTOTAL 155   TROPONINI <0.01       Chronic labs:    Lab Results   Component Value Date    CHOL 137 02/21/2021    TRIG 155 (H) 02/21/2021    HDL 38 02/21/2021    LDLCALC 68 02/21/2021    TSH 2.170 02/21/2021    INR 0.9 02/20/2021    LABA1C 5.5 02/21/2021       Radiology: REVIEWED DAILY    +++++++++++++++++++++++++++++++++++++++++++++++++  Srikanth Parkinson DO  [employer] - [facility], [place]  +++++++++++++++++++++++++++++++++++++++++++++++++  NOTE: This report was transcribed using voice recognition software. Every effort was made to ensure accuracy; however, inadvertent computerized transcription errors may be present.

## 2021-02-21 NOTE — PROGRESS NOTES
OCCUPATIONAL THERAPY INITIAL EVALUATION      Date:2021  Patient Name: Josette Osborne  MRN: [id number]  : 1955  [facility]    Modified Marinette Scale   Score     Description  0             No symptoms  1             No significant disability despite symptoms  2             Slight disability; able to look after own affairs  3             Moderate disability; able to ambulate without assist/ requires assist with ADLs  4             Moderate/Severe disability;requires assist to ambulate/assist with ADLs  5             Severe disability;bedridden/incontinent   6               Score:   4    Evaluating OT: Kath Arce OTR/L  Referring Provider: Leticia Kate DO    AM-PAC Daily Activity Raw Score:   Recommended Adaptive Equipment: to be determined     Comments: Based on patient's functional performance as stated below and level of assistance needed prior to admission, this therapist believes that the patient would benefit from intensive skilled OT following hospital stay in an effort to increase safety, functional independence, and quality of life. Diagnosis: Acute CVA (cerebrovascular accident) [facility]) [I63.9]    Pertinent Medical History: History reviewed. No pertinent past medical history. Precautions:  Falls, L side weakness, contact isolation (c-diff r/o)     Home Living: Pt lives wife in a 1 story with 1 step(s) to enter and no rail(s); bed/bath on main floor; laundry in basement- wife completes  Bathroom setup: tub shower, safety bar  Equipment owned: cane    Prior Level of Function: independent with ADLs and with IADLs; using SPC for ambulation. 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 Latent, poor eye contact throughout session. Would benefit from speech consult. Demo's poor retention of learned skills, requires multiple cues for posture during session.    Memory: G   Sequencing: F Problem solving: F-   Judgement/safety: F     Functional Assessment:   Initial Eval Status  Date: 2/21/21 Treatment Status  Date: STG=LTG (~5-14  days)     Feeding Min A  simulated     Set up A   Grooming Mod A  simulated    Set up A while seated/standing at sink    UB Dressing Mod A  simulated    SBA   LB Dressing DEP  To cherri socks, poor sitting balance EOB for self-attempt    Mod A   Bathing Max A  Simulated    Mod A   Toileting Max A  simulated    Min A   Bed Mobility  Rolling: NT  Supine to sit: Mod A x2  Sit to supine: Mod A  Min A   Functional Transfers Sit to stand: Mod A  Stand to sit: Mod A  Cues for hand placement and body alignment  Min A   Functional Mobility Mod A ww  For side steps at EOB  Min A ww for in-room distance   Balance Sitting:     Static:  Min A    Dynamic: Mod A  Mod/max cues for midline posture  Standing:  Mod A ww     Endurance/Activity Tolerance fair  good   Visual/  Perceptual Glasses: ?  Vision appears WFL, continue to assess              Hand dominance: R  UE ROM: RUE: AROM WFL  LUE: AAROM WFL  Strength: RUE: grossly 4+/5     LUE: shoulder shrug 2/5, shoulder flexion 1/5, elbow flexion 1/5, elbow extension 0/5, wrist extension 0/5, wrist flexion 1/5   Strength: WFL R, diminished L  Fine Motor Coordination: WFL R, impaired L    Hearing: WFL  Sensation: No c/o numbness or tingling  Tone: Flexor tone noted in LUE  Edema: globally edematous Comments: Upon arrival patient supine with HOB slightly elevated- cognition as above. Patient seen in collaboration with PT to increase patient safety d/t patient habitus, current deficits. Bed mobility, functional transfers, sitting balance, midline awareness, toileting, feeding, positioning, addressed. Patient education provided regarding self ROM of LUE to decrease tone and to maintain flexibility. Additional education provided regarding positioning of LUE for joint protection- reinforcement likely needed. After session, patient long sitting with all devices within reach, all lines and tubes intact. Pt required cues and education as noted above for safe facilitation and completion of tasks. Therapist provided skilled monitoring of patient's response during treatment session. Prior to and at the end of session, environmental modifications/line management completed for patients safety and efficiency of treatment session. Overall, patient demonstrates significant difficulties with completion of BADLs and IADLs. Factors contributing to these difficulties include L sided weakness, impaired cognition, impaired sitting balance, decreased endurance, and generalized weakness. As noted above, patient likely to benefit from further OT intervention to increase independence, safety, and overall quality of life. Treatment:   · Bed mobility: Facilitated bed mobility with cues for proper body mechanics and sequencing to prepare for ADL completion. · Functional transfers: Facilitated transfers from various surfaces with cues for body alignment, safety and hand placement. · Therapeutic Exercises: L UE AAROM completed at all levels to maintain strength/flexibility and to decrease edema/contractures to enhance ADL completion. · Postural Balance: Sitting balance retraining to improve righting reactions with postural changes during ADLs. []Splinting/positioning needs to maintain joint/skin integrity and prevent contractures  [x]Therapeutic activity to improve functional performance during ADLs. [x]Therapeutic exercise to improve tolerance and functional strength for ADLs   [x]Balance retraining/tolerance tasks for facilitation of postural control with dynamic challenges during ADLs . [x]Neuromuscular re-education: facilitation of righting/equilibrium reactions, midline orientation, scapular stability/mobility, Normalization muscle     tone and facilitation active functional movement/Attention                         [x]Delirium prevention/treatment    [x]Positioning to improve functional independence  []Other:       Rehab Potential: Good for established goals     Patient / Family Goal: None stated      Patient and/or family were instructed on functional diagnosis, prognosis/goals and OT plan of care. Demonstrated fair understanding. Eval Complexity: Low      Time In: 1010  Time Out: 1032  Total Time: 22 minutes    Min Units   OT Eval Low 97165  X 1    OT Eval Medium 39599      OT Eval High 64644       OT Re-Eval [id number]       Therapeutic Ex 41819       Therapeutic Activities 84094  8 1    ADL/Self Care 91431       Orthotic Management 73984       Neuro Re-Ed 99532       Non-Billable Time          Evaluation Time includes thorough review of current medical information, gathering information on past medical history/social history and prior level of function, completion of standardized testing/informal observation of tasks, assessment of data and education on plan of care and goals.     Maria Ines Angel, OTR/L  [id number]

## 2021-02-21 NOTE — CONSULTS
Historical Provider, MD   spironolactone (ALDACTONE) 50 MG tablet Take 50 mg by mouth daily   Yes Historical Provider, MD   clopidogrel (PLAVIX) 75 MG tablet Take 75 mg by mouth daily   Yes Historical Provider, MD   metoprolol succinate (TOPROL XL) 25 MG extended release tablet Take 25 mg by mouth daily   Yes Historical Provider, MD   sertraline (ZOLOFT) 50 MG tablet Take 50 mg by mouth daily   Yes Historical Provider, MD   metFORMIN (GLUCOPHAGE) 500 MG tablet Take 500 mg by mouth Daily with supper   Yes Historical Provider, MD   levothyroxine (SYNTHROID) 50 MCG tablet Take 75 mcg by mouth Daily   Yes Historical Provider, MD   Multiple Vitamins-Minerals (CENTRUM SILVER PO) Take 1 tablet by mouth daily   Yes Historical Provider, MD   mesalamine (APRISO) 0.375 g extended release capsule Take 1.5 g by mouth daily   Yes Historical Provider, MD       Social History:     Denies ETOH, tobacco, or illicit drugs    Review of Systems:     No chest pain or palpitations  No SOB  No vertigo, lightheadedness or loss of consciousness  No falls, tripping or stumbling  No incontinence of bowels or bladder  No itching or bruising appreciated  No numbness, tingling   + L side weakness     ROS is otherwise negative     Family History:     History reviewed. No pertinent family history. Objective:     BP (!) 187/94   Pulse 70   Temp 97.2 °F (36.2 °C) (Temporal)   Resp 20   Ht 5' 9\" (1.753 m)   Wt (!) 318 lb (144.2 kg)   SpO2 91%   BMI 46.96 kg/m²     General appearance: alert, appears stated age, cooperative and no distress  Head: normocephalic, without obvious abnormality, atraumatic  Eyes: conjunctivae/corneas clear.  .  Neck: no adenopathy, no carotid bruit, supple  Lungs: clear to auscultation bilaterally  Heart: regular rate and rhythm, S1, S2 normal, no murmur, click, rub or gallop  Extremities: normal, atraumatic, no cyanosis or edema  Pulses: 2+ and symmetric  Skin: color, texture, turgor normal---no rashes or lesions Mental Status: alert, oriented, thought content appropriate    Appropriate attention/concentration  Intact fundus of knowledge  Repetition intact  Memories intact    Speech: Mild dysarthria  Language: no aphasias    Cranial Nerves:  I: smell    II: visual acuity     II: visual fields Full    II: pupils MIRIAN   III,VII: ptosis None   III,IV,VI: extraocular muscles  EOMI without nystagmus    V: mastication Normal   V: facial light touch sensation  Decreased L face    V,VII: corneal reflex     VII: facial muscle function - upper  Normal   VII: facial muscle function - lower Normal   VIII: hearing Normal   IX: soft palate elevation  Normal   IX,X: gag reflex    XI: trapezius strength  5/5 R   XI: sternocleidomastoid strength 5/5   XI: neck extension strength  5/5   XII: tongue strength  Normal     Motor:  5/5 on R  1-2/5 L proximal; 3/5 L ; 5-/5 LLE   Obese bulk and normal tone   No drift  No abnormal movements    Sensory:  LT decreased L arm, intact legs   Vibration mild to moderately decreased ankles b/l     Coordination:   FN, FFM and ZAYRA on R; unable on L r/t weakness   HS normal    DTR:     No Babinskis  No Florentino's     No other pathological reflexes    Laboratory/Radiology:     CBC with Differential:    Lab Results   Component Value Date    WBC 8.3 02/21/2021    RBC 4.48 02/21/2021    HGB 13.0 02/21/2021    HCT 39.0 02/21/2021     02/21/2021    MCV 87.1 02/21/2021    MCH 29.0 02/21/2021    MCHC 33.3 02/21/2021    RDW 13.6 02/21/2021     CMP:    Lab Results   Component Value Date     02/21/2021    K 2.4 02/21/2021    CL 99 02/21/2021    CO2 24 02/21/2021    BUN 8 02/21/2021    CREATININE 1.3 02/21/2021    GFRAA >60 02/21/2021    LABGLOM 56 02/21/2021    GLUCOSE 149 02/21/2021    PROT 7.3 02/21/2021    LABALBU 4.0 02/21/2021    CALCIUM 8.7 02/21/2021    BILITOT 0.8 02/21/2021    ALKPHOS 91 02/21/2021    AST 28 02/21/2021    ALT 26 02/21/2021     HgBA1c:    Lab Results   Component Value Date

## 2021-02-21 NOTE — PROGRESS NOTES
Treatment:  Patient practiced and was instructed in the following treatment:    ? Pt performed functional activities as stated above. Pt sat on the side of the bed approx 5 minutes. Demonstrated LOB and lean both posteriorly and to the left, able to correct temporarily with cuing. Ataxia and decreased awareness of L LE noted. Required cuing for placement of L LE with standing and gait. Pt returned to bed upon completion of evaluation. Pt's/ family goals   1. To return to prior functional level    Patient and or family understand(s) diagnosis, prognosis, and plan of care. Yes     PLAN OF CARE:    Current Treatment Recommendations     [x] Strengthening     [x] ROM   [x] Balance Training   [x] Endurance Training   [x] Transfer Training   [x] Gait Training   [x] Stair Training   [x] Positioning   [x] Safety and Education Training   [x] Patient/Caregiver Education   [x] HEP  [] Other     Frequency of treatments: 2-5x/week x 1-2 weeks. Time in  1010  Time out  1030    Total Treatment Time  10 minutes     Evaluation Time includes thorough review of current medical information, gathering information on past medical history/social history and prior level of function, completion of standardized testing/informal observation of tasks, assessment of data and education on plan of care and goals.     CPT codes:  [x] Low Complexity PT evaluation 70211  [] Moderate Complexity PT evaluation 96464  [] High Complexity PT evaluation 32246  [] PT Re-evaluation 29463  [] Gait training 00807  minutes  [] Manual therapy 29449  minutes  [x] Therapeutic activities 23277 10 minutes  [] Therapeutic exercises 29319  minutes  [] Neuromuscular reeducation 28586  minutes     Megan Cartagena [place] [id number]

## 2021-02-22 ENCOUNTER — APPOINTMENT (OUTPATIENT)
Dept: MRI IMAGING | Age: 66
DRG: 065 | End: 2021-02-22
Payer: MEDICARE

## 2021-02-22 LAB
ALBUMIN SERPL-MCNC: 3.8 G/DL (ref 3.5–5.2)
ALP BLD-CCNC: 89 U/L (ref 40–129)
ALT SERPL-CCNC: 22 U/L (ref 0–40)
ANION GAP SERPL CALCULATED.3IONS-SCNC: 11 MMOL/L (ref 7–16)
ANION GAP SERPL CALCULATED.3IONS-SCNC: 11 MMOL/L (ref 7–16)
AST SERPL-CCNC: 28 U/L (ref 0–39)
BILIRUB SERPL-MCNC: 0.6 MG/DL (ref 0–1.2)
BUN BLDV-MCNC: 10 MG/DL (ref 8–23)
BUN BLDV-MCNC: 8 MG/DL (ref 8–23)
CALCIUM SERPL-MCNC: 8.4 MG/DL (ref 8.6–10.2)
CALCIUM SERPL-MCNC: 8.9 MG/DL (ref 8.6–10.2)
CHLORIDE BLD-SCNC: 105 MMOL/L (ref 98–107)
CHLORIDE BLD-SCNC: 105 MMOL/L (ref 98–107)
CO2: 26 MMOL/L (ref 22–29)
CO2: 26 MMOL/L (ref 22–29)
CREAT SERPL-MCNC: 1.1 MG/DL (ref 0.7–1.2)
CREAT SERPL-MCNC: 1.3 MG/DL (ref 0.7–1.2)
GFR AFRICAN AMERICAN: >60
GFR AFRICAN AMERICAN: >60
GFR NON-AFRICAN AMERICAN: 56 ML/MIN/1.73
GFR NON-AFRICAN AMERICAN: >60 ML/MIN/1.73
GLUCOSE BLD-MCNC: 104 MG/DL (ref 74–99)
GLUCOSE BLD-MCNC: 120 MG/DL (ref 74–99)
HCT VFR BLD CALC: 38.9 % (ref 37–54)
HEMOGLOBIN: 12.5 G/DL (ref 12.5–16.5)
MCH RBC QN AUTO: 28.7 PG (ref 26–35)
MCHC RBC AUTO-ENTMCNC: 32.1 % (ref 32–34.5)
MCV RBC AUTO: 89.2 FL (ref 80–99.9)
PDW BLD-RTO: 13.6 FL (ref 11.5–15)
PLATELET # BLD: 175 E9/L (ref 130–450)
PMV BLD AUTO: 11.8 FL (ref 7–12)
POTASSIUM SERPL-SCNC: 2.6 MMOL/L (ref 3.5–5)
POTASSIUM SERPL-SCNC: 3 MMOL/L (ref 3.5–5)
RBC # BLD: 4.36 E12/L (ref 3.8–5.8)
SODIUM BLD-SCNC: 142 MMOL/L (ref 132–146)
SODIUM BLD-SCNC: 142 MMOL/L (ref 132–146)
TOTAL PROTEIN: 7.3 G/DL (ref 6.4–8.3)
WBC # BLD: 6.6 E9/L (ref 4.5–11.5)

## 2021-02-22 PROCEDURE — 70551 MRI BRAIN STEM W/O DYE: CPT

## 2021-02-22 PROCEDURE — 2580000003 HC RX 258: Performed by: INTERNAL MEDICINE

## 2021-02-22 PROCEDURE — 92526 ORAL FUNCTION THERAPY: CPT | Performed by: SPEECH-LANGUAGE PATHOLOGIST

## 2021-02-22 PROCEDURE — 6360000002 HC RX W HCPCS: Performed by: INTERNAL MEDICINE

## 2021-02-22 PROCEDURE — 6370000000 HC RX 637 (ALT 250 FOR IP): Performed by: EMERGENCY MEDICINE

## 2021-02-22 PROCEDURE — 80053 COMPREHEN METABOLIC PANEL: CPT

## 2021-02-22 PROCEDURE — 36415 COLL VENOUS BLD VENIPUNCTURE: CPT

## 2021-02-22 PROCEDURE — 2060000000 HC ICU INTERMEDIATE R&B

## 2021-02-22 PROCEDURE — 85027 COMPLETE CBC AUTOMATED: CPT

## 2021-02-22 PROCEDURE — 92610 EVALUATE SWALLOWING FUNCTION: CPT | Performed by: SPEECH-LANGUAGE PATHOLOGIST

## 2021-02-22 PROCEDURE — 6370000000 HC RX 637 (ALT 250 FOR IP): Performed by: INTERNAL MEDICINE

## 2021-02-22 PROCEDURE — 99232 SBSQ HOSP IP/OBS MODERATE 35: CPT | Performed by: NURSE PRACTITIONER

## 2021-02-22 RX ORDER — POTASSIUM CHLORIDE 20 MEQ/1
40 TABLET, EXTENDED RELEASE ORAL ONCE
Status: COMPLETED | OUTPATIENT
Start: 2021-02-22 | End: 2021-02-22

## 2021-02-22 RX ADMIN — Medication 10 ML: at 20:21

## 2021-02-22 RX ADMIN — MESALAMINE 800 MG: 400 CAPSULE, DELAYED RELEASE ORAL at 14:15

## 2021-02-22 RX ADMIN — POTASSIUM CHLORIDE: 2 INJECTION, SOLUTION, CONCENTRATE INTRAVENOUS at 11:42

## 2021-02-22 RX ADMIN — SPIRONOLACTONE 50 MG: 25 TABLET ORAL at 08:43

## 2021-02-22 RX ADMIN — POTASSIUM CHLORIDE 40 MEQ: 1500 TABLET, EXTENDED RELEASE ORAL at 17:52

## 2021-02-22 RX ADMIN — CLOPIDOGREL 75 MG: 75 TABLET, FILM COATED ORAL at 08:43

## 2021-02-22 RX ADMIN — AMLODIPINE BESYLATE 5 MG: 5 TABLET ORAL at 08:43

## 2021-02-22 RX ADMIN — MESALAMINE 800 MG: 400 CAPSULE, DELAYED RELEASE ORAL at 08:43

## 2021-02-22 RX ADMIN — ENOXAPARIN SODIUM 40 MG: 40 INJECTION SUBCUTANEOUS at 08:43

## 2021-02-22 RX ADMIN — MESALAMINE 800 MG: 400 CAPSULE, DELAYED RELEASE ORAL at 20:21

## 2021-02-22 RX ADMIN — SERTRALINE 50 MG: 50 TABLET, FILM COATED ORAL at 08:43

## 2021-02-22 RX ADMIN — ATORVASTATIN CALCIUM 40 MG: 40 TABLET, FILM COATED ORAL at 20:21

## 2021-02-22 RX ADMIN — POTASSIUM CHLORIDE 40 MEQ: 1500 TABLET, EXTENDED RELEASE ORAL at 12:33

## 2021-02-22 RX ADMIN — POTASSIUM CHLORIDE 40 MEQ: 20 TABLET, EXTENDED RELEASE ORAL at 02:00

## 2021-02-22 RX ADMIN — Medication 10 ML: at 08:43

## 2021-02-22 RX ADMIN — LEVOTHYROXINE SODIUM 75 MCG: 0.07 TABLET ORAL at 06:21

## 2021-02-22 RX ADMIN — ASPIRIN 81 MG: 81 TABLET, CHEWABLE ORAL at 08:42

## 2021-02-22 RX ADMIN — POTASSIUM CHLORIDE 40 MEQ: 1500 TABLET, EXTENDED RELEASE ORAL at 08:42

## 2021-02-22 ASSESSMENT — PAIN SCALES - GENERAL
PAINLEVEL_OUTOF10: 0
PAINLEVEL_OUTOF10: 0

## 2021-02-22 NOTE — PROGRESS NOTES
soft, non-tender  Extremities:  atraumatic, no cyanosis or edema  Skin: color, texture, turgor normal---no rashes or lesions      Mental Status: Alert, oriented, thought content appropriate     Appropriate attention/concentration  Intact fundus of knowledge  Repetition intact  Intact memories      Speech: no dysarthria  Language: no aphasias    Cranial Nerves:  I: smell NA   II: visual acuity  NA   II: visual fields Full to confrontation   II: pupils MIRIAN   III,VII: ptosis None   III,IV,VI: extraocular muscles  EOMI without nystagmus   V: mastication Normal   V: facial light touch sensation  Normal   V,VII: corneal reflex     VII: facial muscle function - upper  Normal   VII: facial muscle function - lower Left droop   VIII: hearing Normal   IX: soft palate elevation  Normal   IX,X: gag reflex    XI: trapezius strength  5/5   XI: sternocleidomastoid strength 5/5   XI: neck extension strength  5/5   XII: tongue strength  Normal     Motor:  5/5 strength on right side, 3/5 left  strength, 4/5 strength LLE  Obese bulk and tone  Unable to lift left arm off bed   No abnormal movements    Sensory:  LT normal right and decreased on left    Coordination:   FN, FFM and ZAYRA normal on right and unable to perform on the left   HS normal    DTR:     No Babinskis  No Florentino's    No other pathological reflexes    Laboratory/Radiology:     CBC with Differential:    Lab Results   Component Value Date    WBC 6.6 02/22/2021    RBC 4.36 02/22/2021    HGB 12.5 02/22/2021    HCT 38.9 02/22/2021     02/22/2021    MCV 89.2 02/22/2021    MCH 28.7 02/22/2021    MCHC 32.1 02/22/2021    RDW 13.6 02/22/2021     CMP:    Lab Results   Component Value Date     02/22/2021    K 3.0 02/22/2021     02/22/2021    CO2 26 02/22/2021    BUN 8 02/22/2021    CREATININE 1.1 02/22/2021    GFRAA >60 02/22/2021    LABGLOM >60 02/22/2021    GLUCOSE 120 02/22/2021    PROT 7.3 02/22/2021    LABALBU 3.8 02/22/2021    CALCIUM 8.4 02/22/2021 BILITOT 0.6 02/22/2021    ALKPHOS 89 02/22/2021    AST 28 02/22/2021    ALT 22 02/22/2021     Hepatic Function Panel:    Lab Results   Component Value Date    ALKPHOS 89 02/22/2021    ALT 22 02/22/2021    AST 28 02/22/2021    PROT 7.3 02/22/2021    BILITOT 0.6 02/22/2021    LABALBU 3.8 02/22/2021     HgBA1c:    Lab Results   Component Value Date    LABA1C 5.5 02/21/2021     FLP:    Lab Results   Component Value Date    TRIG 155 02/21/2021    HDL 38 02/21/2021    LDLCALC 68 02/21/2021    LABVLDL 31 02/21/2021     CT head:  Parenchymal volume loss which seems disproportionate for age  In chronic small vessel ischemic change which which     CTAs head/neck:  1.  Moderate atherosclerotic disease . Probable right A 3 stenosis.   2. Estimated stenosis of the proximal right and left internal carotid  artery by NASCET criteria is not hemodynamically significant     CTP  Small region of ischemic penumbra in the distribution of the distal  right anterior cerebral artery    All labs and imaging studies reviewed independently today    Assessment:     RMCA stroke likely due to Right A3 stenosis  --- LDL 68 and A1c 5.5  --- stroke risk factors include: HTN, HLD    Plan:     ECHO pending  Continue DAPT  Continue statin  PT/OT/SLP  Stroke education  Neurology to follow       FIDENCIO Roberto, CNP  12:04 PM  2/22/2021

## 2021-02-22 NOTE — CARE COORDINATION
Spoke with Dr Lazaro Reaves re: transition of care planning. PM&R consult obtained. Patient visiting his daughter from [place]. Call placed to Geo, liaison with [facility][note 3]. Per Geo, they are likely out of network with the patient's insurance as [place]. Requested they attempt to get a one time contract if the patient has out of network benefits. She will review the case and they will check benefits. Will continue to follow for further transition of care planning needs.      Joellen Grossman.  P:  [phone]

## 2021-02-22 NOTE — PROGRESS NOTES
Acute Rehab Pre-Admission Screen      Referral date: 2/22/21  Onset/Hospital Admit Date: 2/20/2021 10:49 AM    Current Location: 8022/5900-V    Name: Roby Mcmillan  YOB: 1955  Age: 60 y.o. Admitting Diagnosis: CVA   Address: [address] [place] [address] [address] Tami [address]ry [address]  Home Phone: [phone] (home)  Social Security #:     Sex: male  Race:   Marital Status:    Ethnic/Cultural/Pentecostal Considerations: none    Advanced Directives: [x] Full Code  [] [address] [] Medications only       [] Living Will  [] DPOA      []Organ donor      [] No mechanical breathing or ventilation     [] no tube feeding, nutrition or hydration      [x] Patient does not have advanced directives or living will         COVERAGE INFORMATION   Primary Insurer: [facility] [employer]  Payor Contact: Teresa Bella  Phone: [phone]  Authorization #: [id number]    Verified coverage: [] Patient  [] Family/caregiver    [x] financial department [] insurance carrier    COVID SCREEN DATE: 2/23/21 Result: negative    MEDICAL UPDATE:  History of present admission: 60year old male developed sudden onset of left hemiparesis and was brought to [address] [place] [address] on 02/20/2021. He was not felt to be a candidate for t-PA. The initial CT did not show a clear-cut stroke, although further study showed ischemia and an MRI confirmed a right middle cerebral artery stroke. He was put  on antiplatelet agents. 2/22-LUE weakness continues. MRI brain not able to be done due to patient's BMI.  Mobile unit MRI showed findings consistent with acute infarct in the right middle cerebral artery. PHYSICIAN / REFERRAL INFORMATION  Referring Physician: Simón Martinez MD  Attending Physician: Tenzin Cox MD  Primary Care Physician: No primary care provider on file.   Consultants/Opinions (see full consult notes on chart): neurology- CVA    SOCIAL INFORMATION  Primary  Contact: Stephany Jett  Relationship: spouse  Primary Phone: [phone]      Previous Community Services: none  Caregiver available: [x] Yes [] No Hours per day available: tbd  Patient previously employed:  [] Yes [] Part Time [] Full Time [x] No [x] Retired  Occupation/Profession: retired  Prior living arrangements: [x] Home  [] Assisted living  [] SNF [] Other  Lived with:  [] Alone  [x] Spouse  [] Family  [] Other  Lived with: wife  Contact phone: see above  Home:  1 story home  1 entry steps  Rails: 0     Bedroom: [x] 1st floor  [] 2nd floor    Bathroom:  [x] 1st floor  [] 2nd floor    Prior Functional Level: Independent for: ADLs, IADLs with [facility]  Assistance for:   Dependent for: driving  Dominant hand: [] Right  [] Left    Previous Home Equipment:  [x] Cane [] Grab bars [] Orthotic / prosthetic   [] Shower chair [] Tub bench  [] 3-in-1 Commode [] Long handle sponge   [] Oxygen [] Sock aide  [] Wheelchair  [] motorized wc/scooter  [] Wheelchair cushion   [] Crutches [] Long handle shoehorn  [] Reachers [] Toilet seat elevator [] Rollator  [] Walker(wheeled)   [] Walker(standard) [] Mechanical lift    [] None of the above     Has patient had 2 or more falls in the past year or any fall with injury in the past year? [x] yes   [] no   []unknown    Has patient had major surgery during past 100 days prior to admission? [] yes   [x] no Type/ Date:     Surgical History:  History reviewed. No pertinent surgical history. Past Medical:  History reviewed. No pertinent past medical history.     Current Co-morbidities:  [] Alzheimer's   [] Dysphasia     [] Parkinsonism  [] Amputation   [] GERD  [] Peripheral artery disease   [] Anemia      [] Encephalopathy  [] Peripheral vascular disease  [] Anxiety   [] Gangrene   [] Pneumonia  [x] Aphasia   [] Gout    [] Polyneuropathy  [] Asthma   [] Heart Failure (diastolic) [] Post-polio syndrome  [] Atrial fibrillation  [] Heart Failure (left-sided) [] Pseudomonas enteritis   [] Blind   [] Heart Failure (right-sided) [] Pulmonary embolism  [] Cellulitis     [] Heart Failure (systolic) [] Renal dialysis  [] Clostridium difficile  [] Hemiparesis   [] Renal failure  [] Congestive heart failure [] Hypertension   [] Rheumatoid arthritis  [] COPD   [] Hypotension   [] Seizure disorder   [] Coronary Artery Disease [] Hypothyroidism  [] Septicemia   [] Deaf   [] Hyperlipidemia   [] Sleep apnea  [] Depression   [x] Morbid obesity  [] Spinal cord injury  [] Diabetes   [] MRSA   [x] Stroke  [] Diabetic nephropathy  [] Myocardial infarction  [] Tracheostomy  [] Diabetic neuropathy  [] Osteoarthritis  [] Traumatic brain injury   [] Diabetic retinopathy  [] Osteoporosis   [] Urinary tract infection  [] DVT    [] Pancytopenia  [] Vocal cord paralysis  []  Spinal stenosis   []  kidney disease [] VRE  [] Post op    []    []        Medical/Functional Conditions requiring inpatient rehabilitation: Patient has functional deficits in ADLS, mobility, speech, swallow and cognition, impaired balance, and needs ongoing medical management     Risk for Medical/Clinical Complications: Falls, injury, pain, skin breakdown, abnormal vitals, abnormal labs, DVT, PE, pneumonia, decreased mobility, neuro changes     CLINICAL DATA:     Height : 5'9     Weight:  307 lbs   BMI: 45.38       Date: 2/23/21 Date:  Date:    temperature 97.5     pulse 78     respirations 18     Blood pressure 164/93     Pulse oximeter 94% room air        ALLERGIES: Patient has no known allergies.     DIET : DIET GENERAL;    Current Lab and Diagnostic Tests:   Recent Results (from the past 24 hour(s))   Comprehensive Metabolic Panel    Collection Time: 02/23/21  4:43 AM   Result Value Ref Range    Sodium 142 132 - 146 mmol/L    Potassium 3.0 (L) 3.5 - 5.0 mmol/L    Chloride 105 98 - 107 mmol/L    CO2 27 22 - 29 mmol/L    Anion Gap 10 7 - 16 mmol/L    Glucose 101 (H) 74 - 99 mg/dL    BUN 9 8 - 23 mg/dL    CREATININE 1.1 0.7 - 1.2 mg/dL    GFR Non-African American >60 >=60 mL/min/1.73    GFR African Oral Daily    atorvastatin  40 mg Oral Nightly    sodium chloride flush  10 mL Intravenous 2 times per day    enoxaparin  40 mg Subcutaneous Daily    amLODIPine  5 mg Oral Daily    levothyroxine  75 mcg Oral Daily    [Held by provider] losartan  50 mg Oral Daily    [Held by provider] metoprolol succinate  25 mg Oral Daily    sertraline  50 mg Oral Daily    spironolactone  50 mg Oral Daily      IV infusion builder 75 mL/hr at 02/23/21 0340     sodium chloride flush, perflutren lipid microspheres, sodium chloride flush, promethazine **OR** ondansetron, polyethylene glycol, acetaminophen **OR** acetaminophen    SPECIAL PRECAUTIONS: [x] No current precautions  [] Cardiac  [] Renal [] Sternal [] Respiratory      [] Neurological           [] Hip  [] Spinal [] Seizure  [] Aspiration  [] Isolation precautions:    [] Contact   [] Respiratory   [] Protective     [] Droplet    [x] Weight Bearing precautions:         [] Non Weight Bearing :         [] Toe Touch Weight Bearing :        [] Partial Weight Bearing :         [x] Weight Bearing as Tolerated :         [x] Fall Risk:   [x] Recent history of falls [x] Falls risk level (Grimm Scale): high      [x] Bed Alarm    [] Do not leave alone in the bathroom    [] Chair Alarm   [x] Cognitive impairment      [] One to One supervision  [] Sitter / Parul Lantigua sitter   [] Safety enclosure bed  [x] Decreased balance     SPECIAL REHABILITATION NEEDS:   [x] IV Therapy: [] PRN Adapter  [] Midline  [] PICC      [] Central Line    [] TPN       [] Oxygen: [] Trach [] Bi-PAP [] CPAP  [] Nasal cannula  [] Liters:      [] Wound Care:   [] Pressure ulcers(stage and location) -    [] Wound vac   [] Wound or incision care    [] Pain Management (level of pain, meds):      [] Incontinence Bladder [] Olivre  Insertion date:    []Hemodialysis and  Frequency:   [] Incontinence Bowel    [x] Last bowel movement : 2/23/21    Substance use history: [x] Yes  [] No   [] Tobacco  [x] Alcohol  [] Other [] Ethnic  [] Cultural  [] Spiritual  [] Language [] Needs  [] Other than English  [] Hearing Impaired  [] Visually Impaired  [] Speaking Impaired  [] Blind  [] Special equipment:  [] Devices/Splints  [] Type   [] Brace   [] Type  [] Bariatric bed  [] Extra wide commode  [] Extra wide wheelchair [] Extra wide walker  [] Maximilian walker  [] Maximilian wheelchair  [] Transfer lift    [] Other equipment     FUNCTIONAL STATUS PT / Sheryl / Sumaya Aviles:  FIM / EVAL Discipline Initial: 2/21/21 Follow Up:  Current:    Eating OT Minimum assistance     Grooming OT Moderate Assist     Bathing OT Max Assist     Dressing Upper Extremity OT Moderate Assist     Dressing Lower Extremity OT Dependent     Toileting OT Max Assist     Toilet Transfers OT Moderate Assist     Tub/Shower Transfers OT nt     Homemaking OT nt     Bed Mobility PT Moderate Assist     Bed/Wheelchair Transfers PT Moderate Assist     Locomotion Walk / Wheelchair  Device:  Distance: PT 4 side steps with Moderate Assist   x2     Endurance PT fair     Expression SP nt     Social Interaction SP nt     Problem Solving SP impaired     Memory SP impaired     Comprehension SP imparied     Swallowing SP      Bowel Management NSG continent     Bladder Management NSG continent       Comments on Functional Status: Able to tolerate 3 hours of therapy a day.      [x] Able to participate a minimum of 3 hours per day of therapy intervention    Required treatments/services: [x] Rehabilitation nursing [] Dietitian / nurtition                 [x] Case management  [] Respiratory Therapy      [x] Social work   [] Other     Required Therapy:  Therapy Hours per Day Days per Week Therapeutic Interventions Required   [x] Physical Therapy 1 5-7 Gait, transfers, Safety, strength, education, endurance   [x] Occupational Therapy 1 5-7 ADLs, IADLs, Safety, strength, education, endurance   [x] Speech Pathology 1 5-7 Speech, cognition, safety, education   [] Prosthetics / Orthotics       [] Anticipated Discharge Plan:   Anticipated DME Needs:  [x] Home     [] Commode   [] Alone    [] Wheelchair   [x] Supervised    [] Walker   [] Assist    [] Oxygen        [] Hospital Bed  [] Assisted Living    [] Ramp        [x] To Be Determined    Anticipated Home Health Services:  Anticipated Outpatient Services:  [] PT       [] PT  [] OT      [] OT  [] Speech     [] Speech  [] Nursing     [] Dialysis  [] Aide      [x] To Be Determined  [x] To Be Determined    Anticipated support group:  [] Amputation  [] Multiple Sclerosis  [x] Stroke  [] Brain Injury  [] Spinal cord injury  [] Other     Barriers to discharge: impaired self care, impaired mobility    Discharge Support: [] Patient lives alone and does not have a caregiver available     [x] Patient has a caregiver available     [x] Discharge plan has been verified with patient's caregiver      [x] Caregiver is in agreement with the discharge plan     Expected functional status for safe discharge: modified independent    Patient/support person goals: go home to [place]    Expected length of stay: 2-3 weeks    Discussed expected length of stay and agreeable to IRF plan: [x] Yes   [] No    Impairment Group Category: 1.1    Etiological Diagnosis: CVA    Primary Rehabilitation Diagnosis: CVA    Electronically signed by Sea Barertt RN on 2/23/2021 at 12:15 PM    Prescreen completed __________________________________ (signature of prescreener)    Date:   2/23/21 Time:  1300    JUSTIFICATION FOR ADMISSION TO ACUTE REHABILITATION:  Patient has suffered decline in functional abilities for gait, transfers, speech, swallowing, cognition,  ADL's and IADL's as well as endurance. Patient has functional deficits requiring intensive therapy across multiple disciplines in order to return home safely. Patient will need physician oversight for respiratory issues, abnormal vital signs, nutritional and hydration status, safety issues, medications and therapy modalities.  PT, OT and speech will work on deficits as noted in evaluations. Case management and social work will provide services for DME and management of a safe discharge home.         RECOMMEND LEVEL OF CARE  Recommend inpatient rehabilitation: [x] Yes   [] No  If no indicate reason:  [] Functional level too high  [] Unmotivated  [] No insurance carrier approval [] Unlikely to return to community  [] No medical necessity  [] Patient or family chose other facility  [] Too medically complex  [] Inadequate discharge plan  [] Rehabilitation bed unavailable [] Functional level too low  [] patient or family refused ARU    If patient not accepted for IRF admission, recommended level of care:  [] [facility]  [] [address]  [] [place]   [] Home Care  [] Other      [] LTAC       Physician Assigned:  [] Dr. Gustavo Mann         [x] Dr. Nik Spence              [] Dr. Basim Rodriguez [] Dr. Meenakshi Leary  [] Dr. Az Johnsong:    ____________________________________________________________________  ____________________________________________________________________  ____________________________________________________________________  ____________________________________________________________________  ____________________________________________________________________      Physician Signature:_____________________________________    Print Signature:_________________________________________    Date:   2/23/21 Time:    9902

## 2021-02-22 NOTE — PROGRESS NOTES
SPEECH/LANGUAGE PATHOLOGY  CLINICAL ASSESSMENT OF SWALLOWING FUNCTION    PATIENT NAME:  Reanna Bullock      :  1955      TODAY'S DATE:  2021  ROOM:  [address] [place] [address]-A    SUMMARY OF EVALUATION     DYSPHAGIA DIAGNOSIS:  Within functional limits    Pt does have a history of HNC, s/p XRT in 2009 and surgical resection (no further details provided). Reports he does have \"some difficulty\" but upon further questioning, Pt is aware of the strategies and he implements them appropriately during meals. No recent pneumonia. DIET RECOMMENDATIONS: Regular consistency solids with  thin liquids with Pt to select appropriate meal items and use strategies that work for him. FEEDING RECOMMENDATIONS:     Assistance level:  no assistance needed      Compensatory strategies recommended:compensatory strategies are not recommended at this time    THERAPY RECOMMENDATIONS:      Dysphagia therapy is not recommended                  PROCEDURE     Consistencies Administered During the Evaluation   Liquids: Thin   Solids:  Pureed, solids      Method of Intake:   Straw, spoon  Self fed, fed by clinician    Position:   Upright seated                  RESULTS     Oral Stage: The oral stage of swallowing was within functional limits      Pharyngeal Stage:      No signs of aspiration were noted during this evaluation however, silent aspiration cannot be ruled out at bedside. If silent aspiration is suspected, a Videofluoroscopic Study of Swallowing (MBS) is recommended and requires a physician order. The Speech Language Pathologist (SLP) completed education with the patient regarding results of evaluation.  Explained that Speech Pathology intervention is not warranted at this time      CPT code:  41404  bedside swallow eval / 90570

## 2021-02-22 NOTE — PROGRESS NOTES
Hospitalist Progress Note      PCP: No primary care provider on file. Date of Admission: 2/20/2021    Hospital Course:\" 25-year-old male history of CVA who presented with left-sided weakness on 2/20/2021. Alba Urena fell out of bed and was stuck on the ground. Alba Urena normally lives in [place] but is visiting his daughter locally.   Perfusion CT showed small region of ischemic penumbra in the distribution of the distal right anterior cerebral artery.  CT of the head also showing parenchymal volume loss which seems disproportionate for age and small vessel changes.  [facility] from [facility] was not given due to being out of the timeframe. He received aspirin and Plavix load.  Carotid reperfusion therapy was discussed with the patient along with medical management and he chose medical management and wished to return to [place].  \"    Subjective:  No overnight events. He still has LUE weakness. No chest pain.     Medications:  Reviewed    Infusion Medications   Scheduled Medications    mesalamine  800 mg Oral TID    potassium chloride  40 mEq Oral TID WC    aspirin  81 mg Oral Daily    clopidogrel  75 mg Oral Daily    atorvastatin  40 mg Oral Nightly    sodium chloride flush  10 mL Intravenous 2 times per day    enoxaparin  40 mg Subcutaneous Daily    amLODIPine  5 mg Oral Daily    levothyroxine  75 mcg Oral Daily    [Held by provider] losartan  50 mg Oral Daily    [Held by provider] metoprolol succinate  25 mg Oral Daily    sertraline  50 mg Oral Daily    spironolactone  50 mg Oral Daily     PRN Meds: sodium chloride flush, perflutren lipid microspheres, sodium chloride flush, promethazine **OR** ondansetron, polyethylene glycol, acetaminophen **OR** acetaminophen      Intake/Output Summary (Last 24 hours) at 2/22/2021 0944  Last data filed at 2/22/2021 1077  Gross per 24 hour   Intake 240 ml   Output 1125 ml   Net -885 ml       Physical Exam Performed: artery by NASCET criteria is not hemodynamically significant      CTA NECK W CONTRAST   Final Result   1. Moderate atherosclerotic disease . Probable right A 3 stenosis. 2. Estimated stenosis of the proximal right and left internal carotid   artery by NASCET criteria is not hemodynamically significant      CT BRAIN PERFUSION   Final Result   Small region of ischemic penumbra in the distribution of the distal   right anterior cerebral artery      XR CHEST PORTABLE   Final Result   Normal chest      CT HEAD WO CONTRAST   Final Result   Parenchymal volume loss which seems disproportionate for age   In chronic small vessel ischemic change which which   RECOMMENDATIONS:   Findings discussed with referring physician following this report      MRI BRAIN [address]    (Results Pending)           Assessment/Plan:    Active Hospital Problems    Diagnosis    Acute CVA (cerebrovascular accident) ([facility] [address] [I63.9]     Acute ischemic CVA  -CTA head shows probable A3 stenosis  -MRI brain not able to be done due to patient's BMI.  Mobile unit MRI showed findings consistent with acute infarct in the right middle cerebral artery.  -Echocardiogram.  -Patient declined reperfusion therapy and opted for medical management. He is considering reperfusion therapy given no improvement in upper extremity weakness. He plans to complete work-up in [address] [place] [address]. -PT/OT    Non insulin dependent diabetes mellitus  -Metformin on hold due to intolerance.  Continue to monitor blood glucose. Insulin coverage of fingersticks > 180. Hypertension  -Permissive hypertension treated recent CVA. BPs slowly decreasing. Hypothyroidism  -Continue Synthroid    Hypokalemia- patient has had intermittent diarrhea over the past 2 weeks.   -Continue oral potassium supplement. I will start IVF with potassium supplement. History of oral cancer and radiation-  -History of dysphagia. SLP evaluation.      Morbid obesity BMI 46 100

## 2021-02-22 NOTE — CARE COORDINATION
Echo ordered noted from 2/20. Call placed to Leonid Casey, manager with the echo department re: need for test to be completed. She will try to get the test completed today.      Marlys Bullock.

## 2021-02-23 ENCOUNTER — HOSPITAL ENCOUNTER (INPATIENT)
Age: 66
LOS: 17 days | Discharge: HOME | DRG: 057 | End: 2021-03-12
Attending: PHYSICAL MEDICINE & REHABILITATION | Admitting: PHYSICAL MEDICINE & REHABILITATION
Payer: MEDICARE

## 2021-02-23 VITALS
DIASTOLIC BLOOD PRESSURE: 84 MMHG | SYSTOLIC BLOOD PRESSURE: 158 MMHG | WEIGHT: 307.32 LBS | BODY MASS INDEX: 45.52 KG/M2 | OXYGEN SATURATION: 96 % | HEART RATE: 78 BPM | RESPIRATION RATE: 18 BRPM | TEMPERATURE: 97.5 F | HEIGHT: 69 IN

## 2021-02-23 LAB
ALBUMIN SERPL-MCNC: 3.9 G/DL (ref 3.5–5.2)
ALP BLD-CCNC: 89 U/L (ref 40–129)
ALT SERPL-CCNC: 26 U/L (ref 0–40)
ANION GAP SERPL CALCULATED.3IONS-SCNC: 10 MMOL/L (ref 7–16)
AST SERPL-CCNC: 36 U/L (ref 0–39)
BILIRUB SERPL-MCNC: 0.7 MG/DL (ref 0–1.2)
BUN BLDV-MCNC: 9 MG/DL (ref 8–23)
CALCIUM SERPL-MCNC: 8.7 MG/DL (ref 8.6–10.2)
CHLORIDE BLD-SCNC: 105 MMOL/L (ref 98–107)
CHLORIDE URINE RANDOM: 61 MMOL/L
CO2: 27 MMOL/L (ref 22–29)
CREAT SERPL-MCNC: 1.1 MG/DL (ref 0.7–1.2)
GFR AFRICAN AMERICAN: >60
GFR NON-AFRICAN AMERICAN: >60 ML/MIN/1.73
GLUCOSE BLD-MCNC: 101 MG/DL (ref 74–99)
HCT VFR BLD CALC: 39.8 % (ref 37–54)
HEMOGLOBIN: 12.5 G/DL (ref 12.5–16.5)
LV EF: 60 %
LVEF MODALITY: NORMAL
MCH RBC QN AUTO: 28.3 PG (ref 26–35)
MCHC RBC AUTO-ENTMCNC: 31.4 % (ref 32–34.5)
MCV RBC AUTO: 90.2 FL (ref 80–99.9)
PDW BLD-RTO: 13.6 FL (ref 11.5–15)
PLATELET # BLD: 177 E9/L (ref 130–450)
PMV BLD AUTO: 11.8 FL (ref 7–12)
POTASSIUM SERPL-SCNC: 3 MMOL/L (ref 3.5–5)
POTASSIUM, UR: 26.1 MMOL/L
RBC # BLD: 4.41 E12/L (ref 3.8–5.8)
SARS-COV-2, NAAT: NOT DETECTED
SARS-COV-2, PCR: NOT DETECTED
SODIUM BLD-SCNC: 142 MMOL/L (ref 132–146)
SODIUM URINE: 56 MMOL/L
TOTAL PROTEIN: 7.3 G/DL (ref 6.4–8.3)
URIC ACID, SERUM: 5.9 MG/DL (ref 3.4–7)
URIC ACID, SERUM: 6.7 MG/DL (ref 3.4–7)
WBC # BLD: 8.4 E9/L (ref 4.5–11.5)

## 2021-02-23 PROCEDURE — 1280000000 HC REHAB R&B

## 2021-02-23 PROCEDURE — 94660 CPAP INITIATION&MGMT: CPT

## 2021-02-23 PROCEDURE — 84550 ASSAY OF BLOOD/URIC ACID: CPT

## 2021-02-23 PROCEDURE — 93306 TTE W/DOPPLER COMPLETE: CPT

## 2021-02-23 PROCEDURE — 6360000002 HC RX W HCPCS: Performed by: INTERNAL MEDICINE

## 2021-02-23 PROCEDURE — 84300 ASSAY OF URINE SODIUM: CPT

## 2021-02-23 PROCEDURE — 36415 COLL VENOUS BLD VENIPUNCTURE: CPT

## 2021-02-23 PROCEDURE — 84133 ASSAY OF URINE POTASSIUM: CPT

## 2021-02-23 PROCEDURE — 85027 COMPLETE CBC AUTOMATED: CPT

## 2021-02-23 PROCEDURE — 6370000000 HC RX 637 (ALT 250 FOR IP): Performed by: INTERNAL MEDICINE

## 2021-02-23 PROCEDURE — 82436 ASSAY OF URINE CHLORIDE: CPT

## 2021-02-23 PROCEDURE — 2580000003 HC RX 258: Performed by: INTERNAL MEDICINE

## 2021-02-23 PROCEDURE — U0003 INFECTIOUS AGENT DETECTION BY NUCLEIC ACID (DNA OR RNA); SEVERE ACUTE RESPIRATORY SYNDROME CORONAVIRUS 2 (SARS-COV-2) (CORONAVIRUS DISEASE [COVID-19]), AMPLIFIED PROBE TECHNIQUE, MAKING USE OF HIGH THROUGHPUT TECHNOLOGIES AS DESCRIBED BY CMS-2020-01-R: HCPCS

## 2021-02-23 PROCEDURE — 87635 SARS-COV-2 COVID-19 AMP PRB: CPT

## 2021-02-23 PROCEDURE — 80053 COMPREHEN METABOLIC PANEL: CPT

## 2021-02-23 RX ORDER — POLYETHYLENE GLYCOL 3350 17 G/17G
17 POWDER, FOR SOLUTION ORAL DAILY PRN
Status: DISCONTINUED | OUTPATIENT
Start: 2021-02-23 | End: 2021-02-23 | Stop reason: SDUPTHER

## 2021-02-23 RX ORDER — SPIRONOLACTONE 25 MG/1
50 TABLET ORAL DAILY
Status: CANCELLED | OUTPATIENT
Start: 2021-02-24

## 2021-02-23 RX ORDER — PROMETHAZINE HYDROCHLORIDE 25 MG/1
12.5 TABLET ORAL EVERY 6 HOURS PRN
Status: CANCELLED | OUTPATIENT
Start: 2021-02-23

## 2021-02-23 RX ORDER — ONDANSETRON 2 MG/ML
4 INJECTION INTRAMUSCULAR; INTRAVENOUS EVERY 6 HOURS PRN
Status: DISCONTINUED | OUTPATIENT
Start: 2021-02-23 | End: 2021-03-12 | Stop reason: HOSPADM

## 2021-02-23 RX ORDER — ACETAMINOPHEN 325 MG/1
650 TABLET ORAL EVERY 6 HOURS PRN
Status: DISCONTINUED | OUTPATIENT
Start: 2021-02-23 | End: 2021-03-12 | Stop reason: HOSPADM

## 2021-02-23 RX ORDER — LEVOTHYROXINE SODIUM 0.07 MG/1
75 TABLET ORAL DAILY
Status: DISCONTINUED | OUTPATIENT
Start: 2021-02-24 | End: 2021-03-12 | Stop reason: HOSPADM

## 2021-02-23 RX ORDER — ONDANSETRON 2 MG/ML
4 INJECTION INTRAMUSCULAR; INTRAVENOUS EVERY 6 HOURS PRN
Status: CANCELLED | OUTPATIENT
Start: 2021-02-23

## 2021-02-23 RX ORDER — ASPIRIN 81 MG/1
81 TABLET, CHEWABLE ORAL DAILY
Status: DISCONTINUED | OUTPATIENT
Start: 2021-02-24 | End: 2021-03-12 | Stop reason: HOSPADM

## 2021-02-23 RX ORDER — LOSARTAN POTASSIUM 50 MG/1
50 TABLET ORAL DAILY
Status: DISCONTINUED | OUTPATIENT
Start: 2021-02-24 | End: 2021-03-12 | Stop reason: HOSPADM

## 2021-02-23 RX ORDER — POTASSIUM CHLORIDE 20 MEQ/1
60 TABLET, EXTENDED RELEASE ORAL
Status: DISCONTINUED | OUTPATIENT
Start: 2021-02-23 | End: 2021-02-24

## 2021-02-23 RX ORDER — METOPROLOL SUCCINATE 25 MG/1
25 TABLET, EXTENDED RELEASE ORAL DAILY
Status: CANCELLED | OUTPATIENT
Start: 2021-02-24

## 2021-02-23 RX ORDER — LOSARTAN POTASSIUM 50 MG/1
50 TABLET ORAL DAILY
Status: CANCELLED | OUTPATIENT
Start: 2021-02-24

## 2021-02-23 RX ORDER — POLYETHYLENE GLYCOL 3350 17 G/17G
17 POWDER, FOR SOLUTION ORAL DAILY PRN
Status: CANCELLED | OUTPATIENT
Start: 2021-02-23

## 2021-02-23 RX ORDER — ASPIRIN 81 MG/1
81 TABLET, CHEWABLE ORAL DAILY
Status: CANCELLED | OUTPATIENT
Start: 2021-02-24

## 2021-02-23 RX ORDER — AMLODIPINE BESYLATE 5 MG/1
5 TABLET ORAL DAILY
Status: DISCONTINUED | OUTPATIENT
Start: 2021-02-24 | End: 2021-03-04

## 2021-02-23 RX ORDER — AMLODIPINE BESYLATE 5 MG/1
5 TABLET ORAL DAILY
Status: CANCELLED | OUTPATIENT
Start: 2021-02-24

## 2021-02-23 RX ORDER — CLOPIDOGREL BISULFATE 75 MG/1
75 TABLET ORAL DAILY
Status: DISCONTINUED | OUTPATIENT
Start: 2021-02-24 | End: 2021-03-12 | Stop reason: HOSPADM

## 2021-02-23 RX ORDER — SPIRONOLACTONE 25 MG/1
50 TABLET ORAL DAILY
Status: DISCONTINUED | OUTPATIENT
Start: 2021-02-24 | End: 2021-03-12 | Stop reason: HOSPADM

## 2021-02-23 RX ORDER — POLYETHYLENE GLYCOL 3350 17 G/17G
17 POWDER, FOR SOLUTION ORAL DAILY PRN
Status: DISCONTINUED | OUTPATIENT
Start: 2021-02-23 | End: 2021-03-12 | Stop reason: HOSPADM

## 2021-02-23 RX ORDER — ACETAMINOPHEN 325 MG/1
650 TABLET ORAL EVERY 6 HOURS PRN
Status: CANCELLED | OUTPATIENT
Start: 2021-02-23

## 2021-02-23 RX ORDER — ACETAMINOPHEN 650 MG/1
650 SUPPOSITORY RECTAL EVERY 6 HOURS PRN
Status: CANCELLED | OUTPATIENT
Start: 2021-02-23

## 2021-02-23 RX ORDER — ACETAMINOPHEN 325 MG/1
650 TABLET ORAL EVERY 4 HOURS PRN
Status: DISCONTINUED | OUTPATIENT
Start: 2021-02-23 | End: 2021-03-12 | Stop reason: HOSPADM

## 2021-02-23 RX ORDER — CLOPIDOGREL BISULFATE 75 MG/1
75 TABLET ORAL DAILY
Status: CANCELLED | OUTPATIENT
Start: 2021-02-24

## 2021-02-23 RX ORDER — MESALAMINE 400 MG/1
800 CAPSULE, DELAYED RELEASE ORAL 3 TIMES DAILY
Status: CANCELLED | OUTPATIENT
Start: 2021-02-23

## 2021-02-23 RX ORDER — POTASSIUM CHLORIDE 20 MEQ/1
60 TABLET, EXTENDED RELEASE ORAL
Status: CANCELLED | OUTPATIENT
Start: 2021-02-23

## 2021-02-23 RX ORDER — PREDNISONE 20 MG/1
20 TABLET ORAL DAILY
Status: CANCELLED | OUTPATIENT
Start: 2021-02-23 | End: 2021-02-28

## 2021-02-23 RX ORDER — POTASSIUM CHLORIDE 20 MEQ/1
60 TABLET, EXTENDED RELEASE ORAL
Status: DISCONTINUED | OUTPATIENT
Start: 2021-02-23 | End: 2021-02-23 | Stop reason: HOSPADM

## 2021-02-23 RX ORDER — ATORVASTATIN CALCIUM 40 MG/1
40 TABLET, FILM COATED ORAL NIGHTLY
Status: CANCELLED | OUTPATIENT
Start: 2021-02-23

## 2021-02-23 RX ORDER — METOPROLOL SUCCINATE 25 MG/1
25 TABLET, EXTENDED RELEASE ORAL DAILY
Status: DISCONTINUED | OUTPATIENT
Start: 2021-02-24 | End: 2021-03-12 | Stop reason: HOSPADM

## 2021-02-23 RX ORDER — LEVOTHYROXINE SODIUM 0.07 MG/1
75 TABLET ORAL DAILY
Status: CANCELLED | OUTPATIENT
Start: 2021-02-24

## 2021-02-23 RX ORDER — PROMETHAZINE HYDROCHLORIDE 25 MG/1
12.5 TABLET ORAL EVERY 6 HOURS PRN
Status: DISCONTINUED | OUTPATIENT
Start: 2021-02-23 | End: 2021-03-12 | Stop reason: HOSPADM

## 2021-02-23 RX ORDER — MESALAMINE 400 MG/1
800 CAPSULE, DELAYED RELEASE ORAL 3 TIMES DAILY
Status: DISCONTINUED | OUTPATIENT
Start: 2021-02-23 | End: 2021-03-12 | Stop reason: HOSPADM

## 2021-02-23 RX ORDER — PREDNISONE 20 MG/1
20 TABLET ORAL DAILY
Status: COMPLETED | OUTPATIENT
Start: 2021-02-23 | End: 2021-02-27

## 2021-02-23 RX ORDER — ACETAMINOPHEN 650 MG/1
650 SUPPOSITORY RECTAL EVERY 6 HOURS PRN
Status: DISCONTINUED | OUTPATIENT
Start: 2021-02-23 | End: 2021-03-12 | Stop reason: HOSPADM

## 2021-02-23 RX ORDER — ATORVASTATIN CALCIUM 40 MG/1
40 TABLET, FILM COATED ORAL NIGHTLY
Status: DISCONTINUED | OUTPATIENT
Start: 2021-02-23 | End: 2021-03-12 | Stop reason: HOSPADM

## 2021-02-23 RX ADMIN — MESALAMINE 800 MG: 400 CAPSULE, DELAYED RELEASE ORAL at 20:55

## 2021-02-23 RX ADMIN — SERTRALINE 50 MG: 50 TABLET, FILM COATED ORAL at 09:19

## 2021-02-23 RX ADMIN — PREDNISONE 20 MG: 20 TABLET ORAL at 16:41

## 2021-02-23 RX ADMIN — SPIRONOLACTONE 50 MG: 25 TABLET ORAL at 09:20

## 2021-02-23 RX ADMIN — POTASSIUM CHLORIDE 60 MEQ: 1500 TABLET, EXTENDED RELEASE ORAL at 16:41

## 2021-02-23 RX ADMIN — ASPIRIN 81 MG: 81 TABLET, CHEWABLE ORAL at 09:19

## 2021-02-23 RX ADMIN — POTASSIUM CHLORIDE: 2 INJECTION, SOLUTION, CONCENTRATE INTRAVENOUS at 03:40

## 2021-02-23 RX ADMIN — Medication 10 ML: at 09:20

## 2021-02-23 RX ADMIN — MESALAMINE 800 MG: 400 CAPSULE, DELAYED RELEASE ORAL at 09:19

## 2021-02-23 RX ADMIN — ENOXAPARIN SODIUM 40 MG: 40 INJECTION SUBCUTANEOUS at 09:20

## 2021-02-23 RX ADMIN — CLOPIDOGREL 75 MG: 75 TABLET, FILM COATED ORAL at 09:19

## 2021-02-23 RX ADMIN — AMLODIPINE BESYLATE 5 MG: 5 TABLET ORAL at 09:20

## 2021-02-23 RX ADMIN — POTASSIUM CHLORIDE 60 MEQ: 1500 TABLET, EXTENDED RELEASE ORAL at 13:30

## 2021-02-23 RX ADMIN — MESALAMINE 800 MG: 400 CAPSULE, DELAYED RELEASE ORAL at 13:30

## 2021-02-23 RX ADMIN — LEVOTHYROXINE SODIUM 75 MCG: 0.07 TABLET ORAL at 05:13

## 2021-02-23 RX ADMIN — ATORVASTATIN CALCIUM 40 MG: 40 TABLET, FILM COATED ORAL at 20:55

## 2021-02-23 ASSESSMENT — PAIN SCALES - GENERAL: PAINLEVEL_OUTOF10: 0

## 2021-02-23 NOTE — PLAN OF CARE
Problem: Falls - Risk of:  Goal: Will remain free from falls  Description: Will remain free from falls  Outcome: Met This Shift  Goal: Absence of physical injury  Description: Absence of physical injury  Outcome: Met This Shift     Problem: Falls - Risk of:  Goal: Will remain free from falls  Description: Will remain free from falls  Outcome: Met This Shift  Goal: Absence of physical injury  Description: Absence of physical injury  Outcome: Met This Shift     Problem: Skin Integrity:  Goal: Will show no infection signs and symptoms  Description: Will show no infection signs and symptoms  Outcome: Met This Shift  Goal: Absence of new skin breakdown  Description: Absence of new skin breakdown  Outcome: Met This Shift

## 2021-02-23 NOTE — DISCHARGE SUMMARY
Hospital Medicine Discharge Summary    Patient ID: Anibal Barrios      Patient's PCP: No primary care provider on file. Admit Date: 2/20/2021     Discharge Date:   2/23/21    Admitting Physician: Terra Arzate DO     Discharge Physician: Shon Starr MD     Discharge Diagnoses: Active Hospital Problems    Diagnosis    Acute CVA (cerebrovascular accident) [facility]) [I63.9]       The patient was seen and examined on day of discharge and this discharge summary is in conjunction with any daily progress note from day of discharge. Hospital Course:   \" 59-year-old male history of CVA who presented with left-sided weakness on 2/20/2021. Radha Allen fell out of bed and was stuck on the ground. Radha Allen normally lives in [place] but is visiting his daughter locally.   Perfusion CT showed small region of ischemic penumbra in the distribution of the distal right anterior cerebral artery.  CT of the head also showing parenchymal volume loss which seems disproportionate for age and small vessel changes.  Neurology consulted from the [facility] was not given due to being out of the timeframe. He received aspirin and Plavix load.  Carotid reperfusion therapy was discussed with the patient along with medical management and he chose medical management and wished to return to [place].  \"     Acute ischemic CVA with left sided weakness. -CTA head shows probable A3 stenosis. Patient declined reperfusion therapy with neurointervention and opted for medical management. He is considering reperfusion therapy given no improvement in upper extremity weakness. He plans to complete work-up in [place]. I asked Neurology for re-evaluation for reperfusion. -MRI brain not able to be done due to patient's BMI.  Mobile unit MRI showed findings consistent with acute infarct in the right middle cerebral artery.  -Echocardiogram completed and report pending.   -PT/OT-Patient is being admitted to acute rehab.  -He had fall today while trying to Neurologic:  LUE 1/5 strength  Psychiatric: Alert and oriented, thought content appropriate, normal insight       Labs: For convenience and continuity at follow-up the following most recent labs are provided:      CBC:    Lab Results   Component Value Date    WBC 8.4 02/23/2021    HGB 12.5 02/23/2021    HCT 39.8 02/23/2021     02/23/2021       Renal:    Lab Results   Component Value Date     02/23/2021    K 3.0 02/23/2021     02/23/2021    CO2 27 02/23/2021    BUN 9 02/23/2021    CREATININE 1.1 02/23/2021    CALCIUM 8.7 02/23/2021    PHOS 2.4 02/21/2021         Significant Diagnostic Studies    Radiology:   MRI BRAIN WO CONTRAST   Final Result   1. Findings consistent with acute infarct in the right middle cerebral artery   territory. 2. Moderate atrophy. Chronic white matter ischemic changes. CTA HEAD W CONTRAST   Final Result   1. Moderate atherosclerotic disease . Probable right A 3 stenosis. 2. Estimated stenosis of the proximal right and left internal carotid   artery by NASCET criteria is not hemodynamically significant      CTA NECK W CONTRAST   Final Result   1. Moderate atherosclerotic disease . Probable right A 3 stenosis.    2. Estimated stenosis of the proximal right and left internal carotid   artery by NASCET criteria is not hemodynamically significant      CT BRAIN PERFUSION   Final Result   Small region of ischemic penumbra in the distribution of the distal   right anterior cerebral artery      XR CHEST PORTABLE   Final Result   Normal chest      CT HEAD WO CONTRAST   Final Result   Parenchymal volume loss which seems disproportionate for age   In chronic small vessel ischemic change which which   RECOMMENDATIONS:   Findings discussed with referring physician following this report             Consults:     IP CONSULT TO INTERNAL MEDICINE  IP CONSULT TO NEUROLOGY  IP CONSULT TO PHYSICAL MEDICINE REHAB  IP CONSULT TO NEPHROLOGY  IP CONSULT TO NEUROLOGY  IP CONSULT TO NEPHROLOGY    Disposition:  Inpatient rehab     Condition at Discharge: Stable    Discharge Instructions/Follow-up:     [address] [address]  [phone]           Code Status:  Full Code     Activity: activity as tolerated    Diet: regular diet      Discharge Medications:     Current Discharge Medication List           Details   losartan (COZAAR) 50 MG tablet Take 50 mg by mouth daily      potassium chloride (KLOR-CON M20) 20 MEQ extended release tablet Take 30 mEq by mouth daily      atorvastatin (LIPITOR) 40 MG tablet Take 40 mg by mouth every evening       amLODIPine (NORVASC) 5 MG tablet Take 5 mg by mouth daily      spironolactone (ALDACTONE) 50 MG tablet Take 50 mg by mouth daily      clopidogrel (PLAVIX) 75 MG tablet Take 75 mg by mouth daily      metoprolol succinate (TOPROL XL) 25 MG extended release tablet Take 25 mg by mouth daily      sertraline (ZOLOFT) 50 MG tablet Take 50 mg by mouth daily      metFORMIN (GLUCOPHAGE) 500 MG tablet Take 500 mg by mouth Daily with supper      levothyroxine (SYNTHROID) 50 MCG tablet Take 75 mcg by mouth Daily      Multiple Vitamins-Minerals (CENTRUM SILVER PO) Take 1 tablet by mouth daily      mesalamine (APRISO) 0.375 g extended release capsule Take 1.5 g by mouth daily             Time Spent on discharge is more than 30 minutes in the examination, evaluation, counseling and review of medications and discharge plan. Signed:    Virgen Steinberg MD   2/23/2021      Thank you No primary care provider on file. for the opportunity to be involved in this patient's care. If you have any questions or concerns please feel free to contact me at [phone].

## 2021-02-23 NOTE — DISCHARGE INSTR - COC
Last Vital Signs: BP (!) 164/93   Pulse 78   Temp 97.5 °F (36.4 °C) (Temporal)   Resp 18   Ht 5' 9\" (1.753 m)   Wt (!) 307 lb 5.1 oz (139.4 kg)   SpO2 96%   BMI 45.38 kg/m²     Last documented pain score (0-10 scale): Pain Level: 0  Last Weight:   Wt Readings from Last 1 Encounters:   02/23/21 (!) 307 lb 5.1 oz (139.4 kg)     Mental Status:  oriented, alert, thought processes intact and able to concentrate and follow conversation    IV Access:  - None    Nursing Mobility/ADLs:  Walking   Dependent  Transfer  Dependent  Bathing  Dependent  Dressing  Dependent  Toileting  Dependent  Feeding  Assisted  Med Admin  Assisted  Med Delivery   whole    Wound Care Documentation and Therapy:        Elimination:  Continence:   · Bowel: Yes  · Bladder: Yes  Urinary Catheter: None   Colostomy/Ileostomy/Ileal Conduit: No       Date of Last BM: 2/23/21    Intake/Output Summary (Last 24 hours) at 2/23/2021 1354  Last data filed at 2/23/2021 1340  Gross per 24 hour   Intake 600 ml   Output 900 ml   Net -300 ml     I/O last 3 completed shifts:  In: -   Out: 775 [Urine:775]    Safety Concerns:     History of Falls (last 30 days) and At Risk for Falls    Impairments/Disabilities:      None    Nutrition Therapy:  Current Nutrition Therapy:   - Oral Diet:  General    Routes of Feeding: Oral  Liquids: No Liquids  Daily Fluid Restriction: no  Last Modified Barium Swallow with Video (Video Swallowing Test): not done    Treatments at the Time of Hospital Discharge:   Respiratory Treatments: N/A  Oxygen Therapy:  is not on home oxygen therapy.   Ventilator:    - No ventilator support    Rehab Therapies: Physical Therapy and Occupational Therapy  Weight Bearing Status/Restrictions: No weight bearing restirctions  Other Medical Equipment (for information only, NOT a DME order):  wheelchair, bedside commode and hospital bed  Other Treatments: N/A    Patient's personal belongings (please select all that are sent with patient):  Glasses RN SIGNATURE:  Electronically signed by Isaura Tellez RN on 2/23/21 at 1:57 PM EST    CASE MANAGEMENT/SOCIAL WORK SECTION    Inpatient Status Date: ***    Readmission Risk Assessment Score:  Readmission Risk              Risk of Unplanned Readmission:        12           Discharging to Facility/ Agency   · Name:   · Address:  · Phone:  · Fax:    Dialysis Facility (if applicable)   · Name:  · Address:  · Dialysis Schedule:  · Phone:  · Fax:    / signature: {Esignature:[id number]}    PHYSICIAN SECTION    Prognosis: {Prognosis:5423908819}    Condition at Discharge: [facility] Patient Condition:373962366}    Rehab Potential (if transferring to Rehab): {Prognosis:2209074182}    Recommended Labs or Other Treatments After Discharge: ***    Physician Certification: I certify the above information and transfer of Sami Pulido  is necessary for the continuing treatment of the diagnosis listed and that he requires {Admit to Appropriate Level of Care:74887} for {GREATER/LESS:562623968} 30 days.      Update Admission H&P: {CHP DME Changes in LXS:555209812}    PHYSICIAN SIGNATURE:  {Esignature:311903023}

## 2021-02-23 NOTE — LETTER
PORTABLE PATIENT PROFILE  Nataliia Cuevas  5501/[id number]    MEDICAL DIAGNOSIS/CONDITION:   Patient Active Problem List   Diagnosis    Acute CVA (cerebrovascular accident) [facility])       INSURANCE INFORMATION:  Payor: [address] [place] [address] /  /  /     ADVANCED DIRECTIVES:   Advance Directives (For Healthcare)  Healthcare Directive: No, patient does not have an advance directive for healthcare treatment  [unfilled]     EMERGENCY CONTACT:       RISK FACTORS:   Social History     Tobacco Use    Smoking status: Never Smoker    Smokeless tobacco: Never Used   Substance Use Topics    Alcohol use: Not on file       ALLERGIES:  No Known Allergies    IMMUNIZATIONS:    There is no immunization history on file for this patient. SWALLOWING:   Difficulty Chewing or Swallowing Food: No    VISION AND HEARING:   Sensory Problems  Visual impairment: Glasses    PHYSICIANS INVOLVED WITH CARE:    Saeid Mckenna MD  No ref. provider found  No primary care provider on file.   Saeid Mckenna MD

## 2021-02-23 NOTE — CARE COORDINATION
Call Received from Sahil Hearn, [address] with ARU at [facility]. They have received authorization for the patient to transition to ARU today. Per Sahil Hearn, they do have a bed for the patient today. Order for rapid COVID received and dry test provided to nursing to complete for transition of care. Perfect Serve message sent to Dr Alysa Joyce re: patient's ability to transition to rehab today. Await discharge orders.       Joellen Grossman.  P:  [phone]

## 2021-02-23 NOTE — PLAN OF CARE
Plan of care    Spoke with primary regarding patient asking for acute carotid perfusion therapy although he declined this during stroke alert. Pt at that time requested to have his workup completed once he got back home to [place] as he is visiting his daughter here. He choose medical management. Being that this was a DERRICK alert it appears patient has missed the window for acute carotid perfusion therapy.       Plan:  Continue DAPT and statin  Should follow up with OP Neurology if he returns to [place]  Stroke education  PT/OT/SLP  No further Neurologic testing at this time

## 2021-02-23 NOTE — PROGRESS NOTES
Patient oriented to room and new admission folder given. Patient Guide reviewed and explanation of Patient rights and Responsibilities completed. I gave a signed copy of The Important Message from Medicare to patient.  Cady Turner  2/23/2021  4:24 PM

## 2021-02-23 NOTE — CONSULTS
 atorvastatin (LIPITOR) 40 MG tablet Take 40 mg by mouth every evening       amLODIPine (NORVASC) 5 MG tablet Take 5 mg by mouth daily      spironolactone (ALDACTONE) 50 MG tablet Take 50 mg by mouth daily      clopidogrel (PLAVIX) 75 MG tablet Take 75 mg by mouth daily      metoprolol succinate (TOPROL XL) 25 MG extended release tablet Take 25 mg by mouth daily      sertraline (ZOLOFT) 50 MG tablet Take 50 mg by mouth daily      metFORMIN (GLUCOPHAGE) 500 MG tablet Take 500 mg by mouth Daily with supper      levothyroxine (SYNTHROID) 50 MCG tablet Take 75 mcg by mouth Daily      Multiple Vitamins-Minerals (CENTRUM SILVER PO) Take 1 tablet by mouth daily      mesalamine (APRISO) 0.375 g extended release capsule Take 1.5 g by mouth daily         Allergies:    Patient has no known allergies. Social History:     reports that he has never smoked. He has never used smokeless tobacco. He reports previous drug use. Family History:     No family history on file.     ROS:     General: no fever, chills   Heent: no nasal congestion, sore throat   Resp: no cough, sob , hemoptysis  Cardiac: no cp , le edema, palpitations  Gi: no nausea, vomiting, melena, abd pain, hematemesis  Gu: no hematuria, dysuria   Neruo: no numbness, weakness, headache, blurry vision   Endocrine:  no h/o dm  Derm: no rash , petechia  Heme: no epistaxis, bruising  All other sx negative     Physical Exam:      Patient Vitals for the past 24 hrs:   BP Temp Temp src Pulse Resp Height Weight   02/23/21 1505 (!) 183/94 98.3 °F (36.8 °C) Oral 86 18 5' 9\" (1.753 m) (!) 308 lb 12.8 oz (140.1 kg)       No intake or output data in the 24 hours ending 02/23/21 1519    Constitutional: Patient in no acute distress   Head: normocephalic, atraumatic   Neck: supple, no jvd  Cardiovascular: regular rate and rhythm, no murmurs, gallops, or rubs   Respiratory: Clear, no rales, rhochi, or wheezes, Gastrointestinal: soft, nontender, nondistended, no hepatosplenomegaly  Ext: no edema  Neuro: Alert and oriented  Skin: dry, no rash   Back: nontender    Data:    Recent Labs     02/21/21  0938 02/22/21  0731 02/23/21  0443   WBC 8.3 6.6 8.4   HGB 13.0 12.5 12.5   HCT 39.0 38.9 39.8   MCV 87.1 89.2 90.2    175 177       Recent Labs     02/21/21  0938 02/21/21  0938 02/21/21  2351 02/22/21  0731 02/23/21  0443      < > 142 142 142   K 2.4*   < > 2.6* 3.0* 3.0*   CL 99   < > 105 105 105   CO2 24   < > 26 26 27   CREATININE 1.3*   < > 1.3* 1.1 1.1   BUN 8   < > 10 8 9   LABGLOM 56   < > 56 >60 >60   GLUCOSE 149*   < > 104* 120* 101*   CALCIUM 8.7   < > 8.9 8.4* 8.7   PHOS 2.4*  --   --   --   --    MG 1.9  --   --   --   --     < > = values in this interval not displayed. No results found for: VITD25    No results found for: PTH    Recent Labs     02/21/21  0938 02/22/21  0731 02/23/21  0443   ALT 26 22 26   AST 28 28 36   ALKPHOS 91 89 89   BILITOT 0.8 0.6 0.7       Recent Labs     02/21/21  0938 02/22/21  0731 02/23/21  0443   LABALBU 4.0 3.8 3.9       No results found for: FERRITIN, IRON, TIBC    No results found for: CSZRDGAO84    No results found for: FOLATE      No results found for: VOL, APPEARANCE, COLORU, LABSPEC, LABPH, LEUKBLD, NITRU, GLUCOSEU, KETUA, UROBILINOGEN, KETUA, UROBILINOGEN, BILIRUBINUR, OCBU    No results found for: CRESCENCIO, CREURRAN, MACREATRATIO, OSMOU    No components found for: URIC    No results found for: LIPIDPAN      Assessment and Plan:  1. Hypokalemia  Chronic since 2009 per wife  Continue potassium supplement  Check urine lytes  Continue aldactone  Follow daily labs    2.  Hypertension  BP above goal  Metoprolol and losartan on hold given the recent CVA  Follow    FIDENCIO Rhodes - NP

## 2021-02-23 NOTE — PROGRESS NOTES
Approval obtained by payer source. Patient to admit today to ARU bed 5501b. Orders in [facility].

## 2021-02-23 NOTE — PLAN OF CARE
Problem: Falls - Risk of:  Goal: Will remain free from falls  Description: Will remain free from falls  2/23/2021 0236 by Zaheer Rubin RN  Outcome: Met This Shift  2/22/2021 1945 by Edward Aldrich RN  Outcome: Met This Shift  Goal: Absence of physical injury  Description: Absence of physical injury  2/23/2021 0236 by Zaheer Rubin RN  Outcome: Met This Shift  2/22/2021 1945 by Edward Aldrich RN  Outcome: Met This Shift

## 2021-02-23 NOTE — CONSULTS
mouth daily      atorvastatin (LIPITOR) 40 MG tablet Take 40 mg by mouth every evening       amLODIPine (NORVASC) 5 MG tablet Take 5 mg by mouth daily      spironolactone (ALDACTONE) 50 MG tablet Take 50 mg by mouth daily      clopidogrel (PLAVIX) 75 MG tablet Take 75 mg by mouth daily      metoprolol succinate (TOPROL XL) 25 MG extended release tablet Take 25 mg by mouth daily      sertraline (ZOLOFT) 50 MG tablet Take 50 mg by mouth daily      metFORMIN (GLUCOPHAGE) 500 MG tablet Take 500 mg by mouth Daily with supper      levothyroxine (SYNTHROID) 50 MCG tablet Take 75 mcg by mouth Daily      Multiple Vitamins-Minerals (CENTRUM SILVER PO) Take 1 tablet by mouth daily      mesalamine (APRISO) 0.375 g extended release capsule Take 1.5 g by mouth daily         Allergies:    Patient has no known allergies. Social History:     reports that he has never smoked. He has never used smokeless tobacco. He reports previous drug use. Family History:     History reviewed. No pertinent family history.     ROS:     General: no fever, chills   Heent: no nasal congestion, sore throat   Resp: no cough, sob , hemoptysis  Cardiac: no cp , le edema, palpitations  Gi: no nausea, vomiting, melena, abd pain, hematemesis  Gu: no hematuria, dysuria   Neruo: no numbness, weakness, headache, blurry vision   Endocrine:  no h/o dm  Derm: no rash , petechia  Heme: no epistaxis, bruising  All other sx negative     Physical Exam:      Patient Vitals for the past 24 hrs:   BP Temp Temp src Pulse Resp SpO2 Weight   02/23/21 0730 (!) 164/93 97.5 °F (36.4 °C) Temporal 78 18 96 %    02/23/21 0458       (!) 307 lb 5.1 oz (139.4 kg)   02/22/21 2345 (!) 176/88 97.9 °F (36.6 °C) Temporal 88 18 95 %    02/22/21 1930 (!) 154/89 98 °F (36.7 °C) Temporal 94 18 95 %    02/22/21 1509 (!) 146/89 98.6 °F (37 °C) Temporal 75 20 93 %          Intake/Output Summary (Last 24 hours) at 2/23/2021 1333  Last data filed at 2/23/2021 0830  Gross per 24 hour   Intake 300 ml   Output 400 ml   Net -100 ml       Constitutional: Patient in no acute distress   Head: normocephalic, atraumatic   Neck: supple, no jvd  Cardiovascular: regular rate and rhythm, no murmurs, gallops, or rubs   Respiratory: Clear, no rales, rhochi, or wheezes,   Gastrointestinal: soft, nontender, nondistended, no hepatosplenomegaly  Ext: no edema  Neuro: Alert and oriented  Skin: dry, no rash   Back: nontender    Data:    Recent Labs     02/21/21 0938 02/22/21  0731 02/23/21 0443   WBC 8.3 6.6 8.4   HGB 13.0 12.5 12.5   HCT 39.0 38.9 39.8   MCV 87.1 89.2 90.2    175 177       Recent Labs     02/21/21 0938 02/21/21 0938 02/21/21  2351 02/22/21 0731 02/23/21 0443      < > 142 142 142   K 2.4*   < > 2.6* 3.0* 3.0*   CL 99   < > 105 105 105   CO2 24   < > 26 26 27   CREATININE 1.3*   < > 1.3* 1.1 1.1   BUN 8   < > 10 8 9   LABGLOM 56   < > 56 >60 >60   GLUCOSE 149*   < > 104* 120* 101*   CALCIUM 8.7   < > 8.9 8.4* 8.7   PHOS 2.4*  --   --   --   --    MG 1.9  --   --   --   --     < > = values in this interval not displayed. No results found for: VITD25    No results found for: PTH    Recent Labs     02/21/21 0938 02/22/21 0731 02/23/21 0443   ALT 26 22 26   AST 28 28 36   ALKPHOS 91 89 89   BILITOT 0.8 0.6 0.7       Recent Labs     02/21/21 0938 02/22/21  0731 02/23/21 0443   LABALBU 4.0 3.8 3.9       No results found for: FERRITIN, IRON, TIBC    No results found for: [id number]    No results found for: FOLATE      No results found for: VOL, APPEARANCE, COLORU, LABSPEC, LABPH, LEUKBLD, NITRU, GLUCOSEU, KETUA, UROBILINOGEN, KETUA, UROBILINOGEN, BILIRUBINUR, OCBU    No results found for: CRESCENCIO, CREURRAN, MACREATRATIO, OSMOU    No components found for: URIC    No results found for: LIPIDPAN      Assessment and Plan:  1. Hypokalemia  Chronic since 2009 per wife  Continue potassium supplement  Check urine lytes  Continue aldactone  Follow daily labs    2.  Hypertension BP above goal  Metoprolol and losartan on hold given the recent CVA  Follow    Farhad Mccall, FIDENCIO - NP

## 2021-02-24 LAB
ANION GAP SERPL CALCULATED.3IONS-SCNC: 10 MMOL/L (ref 7–16)
BASOPHILS ABSOLUTE: 0 E9/L (ref 0–0.2)
BASOPHILS RELATIVE PERCENT: 0.3 % (ref 0–2)
BUN BLDV-MCNC: 12 MG/DL (ref 8–23)
CALCIUM SERPL-MCNC: 9.1 MG/DL (ref 8.6–10.2)
CHLORIDE BLD-SCNC: 105 MMOL/L (ref 98–107)
CO2: 24 MMOL/L (ref 22–29)
CREAT SERPL-MCNC: 1 MG/DL (ref 0.7–1.2)
EOSINOPHILS ABSOLUTE: 0.1 E9/L (ref 0.05–0.5)
EOSINOPHILS RELATIVE PERCENT: 0.9 % (ref 0–6)
GFR AFRICAN AMERICAN: >60
GFR NON-AFRICAN AMERICAN: >60 ML/MIN/1.73
GLUCOSE BLD-MCNC: 99 MG/DL (ref 74–99)
HCT VFR BLD CALC: 40.6 % (ref 37–54)
HEMOGLOBIN: 13.1 G/DL (ref 12.5–16.5)
LYMPHOCYTES ABSOLUTE: 0.45 E9/L (ref 1.5–4)
LYMPHOCYTES RELATIVE PERCENT: 3.5 % (ref 20–42)
MCH RBC QN AUTO: 28.7 PG (ref 26–35)
MCHC RBC AUTO-ENTMCNC: 32.3 % (ref 32–34.5)
MCV RBC AUTO: 89 FL (ref 80–99.9)
MONOCYTES ABSOLUTE: 0.79 E9/L (ref 0.1–0.95)
MONOCYTES RELATIVE PERCENT: 7 % (ref 2–12)
NEUTROPHILS ABSOLUTE: 10.06 E9/L (ref 1.8–7.3)
NEUTROPHILS RELATIVE PERCENT: 88.7 % (ref 43–80)
PDW BLD-RTO: 13.8 FL (ref 11.5–15)
PLATELET # BLD: 214 E9/L (ref 130–450)
PMV BLD AUTO: 11.6 FL (ref 7–12)
POTASSIUM REFLEX MAGNESIUM: 3.8 MMOL/L (ref 3.5–5)
RBC # BLD: 4.56 E12/L (ref 3.8–5.8)
RBC # BLD: NORMAL 10*6/UL
SODIUM BLD-SCNC: 139 MMOL/L (ref 132–146)
WBC # BLD: 11.3 E9/L (ref 4.5–11.5)

## 2021-02-24 PROCEDURE — 97530 THERAPEUTIC ACTIVITIES: CPT

## 2021-02-24 PROCEDURE — 92523 SPEECH SOUND LANG COMPREHEN: CPT

## 2021-02-24 PROCEDURE — 92507 TX SP LANG VOICE COMM INDIV: CPT

## 2021-02-24 PROCEDURE — 97112 NEUROMUSCULAR REEDUCATION: CPT

## 2021-02-24 PROCEDURE — 6370000000 HC RX 637 (ALT 250 FOR IP): Performed by: PHYSICAL MEDICINE & REHABILITATION

## 2021-02-24 PROCEDURE — 97110 THERAPEUTIC EXERCISES: CPT

## 2021-02-24 PROCEDURE — 36415 COLL VENOUS BLD VENIPUNCTURE: CPT

## 2021-02-24 PROCEDURE — 92610 EVALUATE SWALLOWING FUNCTION: CPT

## 2021-02-24 PROCEDURE — 97162 PT EVAL MOD COMPLEX 30 MIN: CPT

## 2021-02-24 PROCEDURE — 1280000000 HC REHAB R&B

## 2021-02-24 PROCEDURE — 97535 SELF CARE MNGMENT TRAINING: CPT

## 2021-02-24 PROCEDURE — 6370000000 HC RX 637 (ALT 250 FOR IP): Performed by: INTERNAL MEDICINE

## 2021-02-24 PROCEDURE — 97166 OT EVAL MOD COMPLEX 45 MIN: CPT

## 2021-02-24 PROCEDURE — 94660 CPAP INITIATION&MGMT: CPT

## 2021-02-24 PROCEDURE — 6360000002 HC RX W HCPCS: Performed by: INTERNAL MEDICINE

## 2021-02-24 PROCEDURE — 85025 COMPLETE CBC W/AUTO DIFF WBC: CPT

## 2021-02-24 PROCEDURE — 80048 BASIC METABOLIC PNL TOTAL CA: CPT

## 2021-02-24 RX ORDER — POTASSIUM CHLORIDE 20 MEQ/1
20 TABLET, EXTENDED RELEASE ORAL
Status: DISCONTINUED | OUTPATIENT
Start: 2021-02-24 | End: 2021-02-26

## 2021-02-24 RX ADMIN — MESALAMINE 800 MG: 400 CAPSULE, DELAYED RELEASE ORAL at 14:00

## 2021-02-24 RX ADMIN — MESALAMINE 800 MG: 400 CAPSULE, DELAYED RELEASE ORAL at 08:05

## 2021-02-24 RX ADMIN — AMLODIPINE BESYLATE 5 MG: 5 TABLET ORAL at 08:11

## 2021-02-24 RX ADMIN — POTASSIUM CHLORIDE 20 MEQ: 1500 TABLET, EXTENDED RELEASE ORAL at 12:24

## 2021-02-24 RX ADMIN — SPIRONOLACTONE 50 MG: 25 TABLET ORAL at 08:05

## 2021-02-24 RX ADMIN — SERTRALINE 50 MG: 50 TABLET, FILM COATED ORAL at 08:06

## 2021-02-24 RX ADMIN — CLOPIDOGREL 75 MG: 75 TABLET, FILM COATED ORAL at 08:06

## 2021-02-24 RX ADMIN — METOPROLOL SUCCINATE 25 MG: 25 TABLET, EXTENDED RELEASE ORAL at 08:06

## 2021-02-24 RX ADMIN — PREDNISONE 20 MG: 20 TABLET ORAL at 08:06

## 2021-02-24 RX ADMIN — LEVOTHYROXINE SODIUM 75 MCG: 0.07 TABLET ORAL at 05:36

## 2021-02-24 RX ADMIN — ATORVASTATIN CALCIUM 40 MG: 40 TABLET, FILM COATED ORAL at 21:09

## 2021-02-24 RX ADMIN — POTASSIUM CHLORIDE 20 MEQ: 1500 TABLET, EXTENDED RELEASE ORAL at 08:11

## 2021-02-24 RX ADMIN — ENOXAPARIN SODIUM 40 MG: 40 INJECTION SUBCUTANEOUS at 08:04

## 2021-02-24 RX ADMIN — ASPIRIN 81 MG: 81 TABLET, CHEWABLE ORAL at 08:06

## 2021-02-24 RX ADMIN — MESALAMINE 800 MG: 400 CAPSULE, DELAYED RELEASE ORAL at 21:09

## 2021-02-24 RX ADMIN — LOSARTAN POTASSIUM 50 MG: 50 TABLET, FILM COATED ORAL at 08:06

## 2021-02-24 RX ADMIN — POTASSIUM CHLORIDE 20 MEQ: 1500 TABLET, EXTENDED RELEASE ORAL at 17:13

## 2021-02-24 ASSESSMENT — PAIN SCALES - GENERAL: PAINLEVEL_OUTOF10: 0

## 2021-02-24 NOTE — PROGRESS NOTES
Physical Therapy    Facility/Department: [facility] 5SE REHAB  Initial Assessment    NAME: Fabien Viramontes  : 1955  MRN: [id number]    Date of Service: 2021    Evaluating Therapist: Kwadwo Shen PT, DPVICTORINA    ROOM: [facility]  DIAGNOSIS: CVA  PRECAUTIONS: falls, L hemiparesis (UE worse than LE), sling, hx oral cancer, hx CVA in 2016 with mild chronic L hemiparesis, hx R meniscus repair in 2016, TSM, general diet  HPI: Pt is a 60year old male who developed sudden onset of left hemiparesis and was brought to [facility] on 2021. He was not felt to be a candidate for t-PA. MRI brain not able to be done due to patient's BMI. CT perfusion showed small region of ischemic penumbra in the distribution of the R HEMA. Mobile unit MRI showed findings consistent with acute infarct in the right middle cerebral artery. He was put  on antiplatelet agents. Social:  Pt is in town from [facility] with wife, visiting and staying with his daughter and her family ( and 25year old son, all work). In [facility], pt lives with wife in a 2 floor plan with 1 ANITA + 3 steps without HR to main floor. Bedroom and bathroom on first floor. Laundry in basement with full flight to reach. Wife able to provide supervision as needed. Pt may return to daughter's home which is a split level with 1 ANITA + 4 steps with R HR to reach main floor where bedroom and bathroom are located. Prior to admission: Pt ambulated with R SPC, was functionally inde     Initial Evaluation  21 AM     PM    Short Term Goals Long Term Goals    Was pt agreeable to Eval/treatment? yes       Does pt have pain?  No c/o pain       Bed Mobility  Rolling: Cassi  Supine to sit: Cassi  Sit to supine: Cassi  Scooting: Cassi   SBA Mod I   Transfers Sit to stand: Cassi  Stand to sit: Cassi  Stand pivot: Cassi with R LBQC   SBA Mod I   Ambulation    75 feet with R LBQC with Cassi (WC follow)    150 feet with AAD with  feet with AAD with Mod I Walking 10 feet on uneven surface TBA   10 feet with AAD with SBA 10 feet with AAD with Mod I   Wheel Chair Mobility NA       Car Transfers Cassi with R LBQC   SBA Mod I   Stair negotiation: ascended and descended  4 steps with R rail with Cassi   8 steps with 1 rail with SBA 12 steps with 1 rail with Mod I   Curb Step:   ascended and descended TBA   7.5 inch step with AAD and SBA 7.5 inch step with AAD and Mod I   Picking up object off the floor TBA   Will  2lb weight with SB assist Will  4lb weight with Mod I   BLE ROM WNL       BLE Strength RLE grossly 5/5  L hip flexion 3+/5  L knee extension 4/5  L ankle DF/PF 4/5       Balance  Static and dynamic standing balance Cassi with R LBQC       Date Family Teach Completed        Is additional Family Teaching Needed? Y or N Y       Hindering Progress Hemiparesis       PT recommended ELOS 3 weeks       Team's Discharge Plan        Therapist at Team Meeting          Pt is alert and Oriented x3  Sensation: pt reports paresthesias to L extremities, light touch intact  Edema: none noted  Endurance: fair   Skin was inspected: grossly intact     ASSESSMENT  Pt displays functional ability as noted in the objective portion of this evaluation.       Comments:

## 2021-02-24 NOTE — PROGRESS NOTES
SPEECH/LANGUAGE PATHOLOGY  CLINICAL ASSESSMENT OF SWALLOWING FUNCTION    PATIENT NAME:  Jazmyn Rahman      :  1955      TODAY'S DATE:  2021  ROOM:  [address] [place] [address]    SUMMARY OF EVALUATION     DYSPHAGIA DIAGNOSIS:  Within functional limits                             Per patient report and chart review, patient has a history HNC in , s/p XRT and surgical resection. Patient has experienced occasional difficulties since then; however, he is aware of swallowing compensatory strategies and implements them appropriately during meals. DIET RECOMMENDATIONS: Regular consistency solids with  thin liquids     FEEDING RECOMMENDATIONS:     Assistance level:  No assistance needed      Compensatory strategies recommended: Small bites/sips, Double swallow as needed    THERAPY RECOMMENDATIONS:      Dysphagia therapy is not recommended                  PROCEDURE     Consistencies Administered During the Evaluation   Liquids: Thin   Solids:  Pureed, solids      Method of Intake:   Spoon/fork, cup  Self fed    Position:   Upright seated                  RESULTS     Oral Stage: The oral stage of swallowing was within functional limits      Pharyngeal Stage:      No signs of aspiration were noted during this evaluation however, silent aspiration cannot be ruled out at bedside. If silent aspiration is suspected, a Videofluoroscopic Study of Swallowing (MBS) is recommended and requires a physician order. The Speech Language Pathologist (SLP) completed education with the patient regarding results of evaluation. Explained that Speech Pathology intervention is not warranted at this time      CPT code:  28471  bedside swallow eval        [x]The admitting diagnosis and active problem list, as listed below have been reviewed prior to initiation of this evaluation.      ADMITTING DIAGNOSIS: Acute CVA (cerebrovascular accident) [facility]) [I63.9]     ACTIVE PROBLEM LIST:   Patient Active Problem List Diagnosis    [facility]

## 2021-02-24 NOTE — CARE COORDINATION
Social Work Assessment Summary    PCP: Dakota Byrne    Patient Resides: With spouse, Stephany Iyer    Home Architecture : Pt lives in a multi level house. Main entrance has 3 steps with a rail. there's 2 steps from main living level to kitchen. Bathroom has a tub/shower combo with shower curtains. Basement has 13 steps with a rail to the bottom (pt does not go down). Will you return to your own home? Undecided. Will either return home to [place] or go to a nursing home around here. Primary Caregiver: Spouse, Stephany Iyer (61), stay at home wife, is healthy and can drive. Daughter, Stefan Disla, 40, works at a post office, is healthy and can drive. Level of Function PTA : Independent in all, but driving secondary to stroke history    Employment: Retired in 2016 from an [employer] local as a  after having a stroke. Receives SSD Yes Reason: stroke    DME Pta  : Straight cane, Rollator    Community Agency Involvement PTA : Received chemotherapy for oral cancer. Went to PT, OT, and SP at [facility] after the 2016 stroke. Do they have a medical profile: no    Family Teaching: TBD    Strength: Stubborn    Preference:  To be able to walk      NAME RELATION HOME # WORK # CELL #   Akua Pinto spouse   [phone]   Stefan Disla dtr   [phone]            Height: 6'0  Weight: [address] KAROL Oro  2/24/2021

## 2021-02-24 NOTE — PROGRESS NOTES
Date: 2/23/2021    Time: 10:18 PM    Patient Placed On BIPAP/CPAP/ Non-Invasive Ventilation? Yes    If no must comment. Facial area red/color change? No           If YES are Blister/Lesion present? No   If yes must notify nursing staff  BIPAP/CPAP skin barrier?   Yes    Skin barrier type:mepilexlite       Comments:        Mane Banks

## 2021-02-24 NOTE — PROGRESS NOTES
SPEECH LANGUAGE PATHOLOGY  DAILY PROGRESS NOTE        PATIENT NAME:  Theresa Ellison      :  1955          TODAY'S DATE:  2021 ROOM:  5501/5501-B    Patient seen in room w/ wife present. SLP reviewed goals associated w/ STM/recall, problem solving, and reasoning in order to increase overall safety and participation during ADL/IADL tasks and to increase patient ability to return to PLOF. Patient and wife verbalized understanding and agreement.      CPT code(s) F3684116  therapeutic interventions that focus on cognitive function , initial  15 min  Total minutes :  15 minutes

## 2021-02-24 NOTE — PROGRESS NOTES
Occupational Therapy   Occupational Therapy Initial Assessment  Date: 2021   Patient Name: Edgard Broderick  MRN: [id number]     : 1955  Room: 5501B    Referring Practitioner: Ji Bautista MD  Diagnosis: CVA- RMCA  Additional Pertinent Hx: HLD, HTN, obese & hypothyroidism  Restrictions: Fall Risk    Date of Service: 2021    Discharge Recommendations:  24 hour supervision or assist, Home with Home health OT, Home with nursing aide     Subjective   Chart Reviewed: Yes  Family / Caregiver Present: No  Subjective: Pt presents supine in bed & was agreeable to OT intervention  Pain Comments: Pt did not c/o pain during OT session    Social/Functional History  Lives With: Spouse  Type of Home: House  Home Layout: One level, Laundry in basement- 10-12 steps 1HR  Home Access: Stairs to enter without rails  Entrance Stairs - Number of Steps: 1 ANITA no HR  Bathroom Shower/Tub: Tub/Shower unit  Bathroom Toilet: Standard  Bathroom Equipment: Grab bars in [address] [place], [place] [address]vard: Cane-uses cane primarily when out in the community  ADL Assistance: [address] [place] [address] Avenue: Independent  Homemaking Responsibilities: Yes  Meal Prep Responsibility: Primary  Laundry Responsibility: Primary  Cleaning Responsibility: Primary  Ambulation Assistance: Independent  Transfer Assistance: Independent  Active : Yes  Mode of Transportation: Car  Education: Pt educated with regards to dressing strategies to promote pt functional performance. Pt also educated with regards to safety during mobility & transfers. Pt education to be ongiong to ensure pt safety  Occupation: Retired  Type of occupation: electric company  Additional Comments: Pt lives in [place] & is here staying with his daughter- split level home 1 step into home &4-5 steps 1HR to bed & bath level- walk in shower with doors.  Pt daughter has 1 dog & 3 cats     Objective   Vision: Impaired  Vision Exceptions: Wears glasses at all times Hearing: Within functional limits       Observation/Palpation  Posture: Fair     Balance  Sitting Balance: Stand by assistance  Standing Balance: Moderate assistance  Functional Mobility  Functional - Mobility Device: Rolling Walker  Activity: Other  Assist Level: Moderate assistance  Functional Mobility Comments: vc's for hand placement & safety  Toilet Transfers  Toilet - Technique: Stand pivot  Equipment Used: Standard toilet  Toilet Transfer: Maximum assistance  Toilet Transfers Comments: Pt require Mod A to stand pivot & Max A to unfasten jeans. ADL  Feeding: Minimal assistance;Setup  Grooming: Maximum assistance;Setup;Verbal cueing; Increased time to complete(seated & vc's for 1-handed strategies)  UE Bathing: Minimal assistance;Setup;Verbal cueing; Increased time to complete  LE Bathing: Maximum assistance; Increased time to complete;Verbal cueing;Setup  UE Dressing: Dependent/Total;Setup;Verbal cueing; Increased time to complete(vc's for lg dressing trechniques)  LE Dressing: Dependent/Total;Setup;Verbal cueing; Increased time to complete  Toileting: Dependent/Total;Setup; Increased time to complete; Other (Comment)  Additional Comments: Pt require vc's for safety & insight     Coordination  Movements Are Fluid And Coordinated: No  Coordination and Movement description: Fine motor impairments; Left UE     Bed mobility  Supine to Sit: Minimal assistance  Scooting: Minimal assistance  Comment: vc's for hand placement & safety  Transfers  Stand Pivot Transfers: Minimal assistance  Sit to stand: Minimal assistance  Stand to sit: Minimal assistance  Transfer Comments: Pt require vc's for hand placement & safety     Vision - Basic Assessment  Prior Vision: Wears glasses all the time  Visual History: No significant visual history  Patient Visual Report: No visual complaint reported.      Cognition  Overall Cognitive Status: Exceptions  Arousal/Alertness: Delayed responses to stimuli Following Commands: (pt followed a 2/3 step command)  Problem Solving: Assistance required to generate solutions;Assistance required to correct errors made  Insights: Decreased awareness of deficits  Initiation: Requires cues for some  Sequencing: Requires cues for some     Sensation  Overall Sensation Status: Impaired  Light Touch: Partial deficits in the LUE      LUE PROM: WFL  Left Hand PROM: Pt has a gross grasp & was able to oppose his thumb to his index & middle finger  RUE AROM : WFL  Right Hand AROM: WFL    LUE Strength  L Hand: 3/5  LUE Strength Comment: 0/5 shoulder & elbow 2-/5 wrist  RUE Strength  R Hand: 5/5  RUE Strength Comment: 5/5 shoulder, elbow & wrist     Hand Dominance: Right    Left Hand Strength -    Handle Setting 2: 38# & norm for pt age & gender is 77#  Right Hand Strength -    Handle Setting 2: 105# & norm for pt age & gender is 89#    Fine Motor Skills  Left 9-Hole Peg Test: pt u/a- norm for pt age & gender is 21.6 seconds  Right 9-Hole Peg Test: 36.7 seconds & norm for pt age & gender is 20.9 seconds     Plan   Times per week: OT twice a day for 5 weeks to address above problem areas  Times per day: Twice a day  Current Treatment Recommendations: Strengthening, Endurance Training, Neuromuscular Re-education, Patient/Caregiver Education & Training, ROM, Cognitive Reorientation, Equipment Evaluation, Education, & procurement, Self-Care / ADL, Balance Training, Gait Training, Home Management Training, Functional Mobility Training, Safety Education & Training, Positioning    Assessment   Performance deficits / Impairments: Decreased functional mobility ; Decreased strength;Decreased endurance;Decreased coordination;Decreased ADL status; Decreased safe awareness;Decreased high-level IADLs;Decreased posture;Decreased ROM; Decreased cognition;Decreased balance;Decreased fine motor control  Prognosis: Good  Decision Making: Medium Complexity OT Education: Transfer Training;Equipment;OT Role;Plan of Care;Energy Conservation;Home Exercise Program;Precautions; ADL Adaptive Strategies  Patient Education: Pt educated with regards to OT process. Pt participated in OT goal planning. Pt pleasant & cooperative throughout the OT session  REQUIRES OT FOLLOW UP: Yes  Activity Tolerance  Activity Tolerance: Patient Tolerated treatment well  Safety Devices  Safety Devices in place: Yes  Type of devices: Call light within reach         Goals  Long term goals  Time Frame for Long term goals : 3-4 weeks to address above problem areas  Long term goal 1: Pt demo Mod I to eat all meals  Long term goal 2: Pt demo Mod I to perfrom grooming seated  Long term goal 3: Pt dmeo SBA-CGA to bathe @ shower level both seated & standing  Long term goal 4: Pt demo s/u UE dress & SBA-CGA to don underwear & patns & s/u to don socks & shoes using AE  Long term goal 5: Pt demo SBA commode trf & SBA-CGA to perfrom toileting & use approrpiate DME to ensure pt safety  Long term goals 6: Pt demo CGA tub trf wtih appropriate DME to ensure pt safety  Long term goal 7: Pt demo Min A light homemaking activity & demo G- safety & insight  Long term goal 8: Pt demo G- endurance for a 30 minute functional activity  Long term goal 9: Pt demo improved FMC & strength as evidenced by gains made by  strength was 38# & norm for pt age & gender is 77#  Long term goal 10: Pt will tolerated PROM/AAROM/AROM LUE in all planes & positioning to ensure joint integrity  Patient Goals   Patient goals : \"do normalstuff- clean & cook. \"       Therapy Time   Individual Concurrent Group Co-treatment   Time In 715-OT eval  725-OT rx         Time Out 725-OT eval  805-OT rx         Minutes 50 Min OT total  10 Min OT eval  40 Min OT rx            Chuck King OTR/L [id number]

## 2021-02-24 NOTE — PROGRESS NOTES
[facility]  Nephrology Attending Progress Note  Charles Eastman. Noelia Fleischer, MD        SUBJECTIVE:     2/23: Mr Adolfo Gonzalez is a 60year old male with PMH of HTN, HLD, CVA, DM and oral cancer. He originally presented to the hospital with L sided weakness. Per his wife, he has had hypokalemia and hypomagnesemia since 2009 when he began receiving chemotherapy for his oral cancer. He is being admitted to [facility] when there is a bed available. He is currently on potassium chloride 60mEq TID and has IVF infusing with 40mEq of potassium chloride added to it. Upon assessment, the patient is resting in bed. He states he fell earlier today while he was picking something up off of the floor. He denies SOB or CP.     2/24: pt seen at acute rehab, with chronic loose stools      PROBLEM LIST:    Patient Active Problem List   Diagnosis    Acute CVA (cerebrovascular accident) [facility])        PAST MEDICAL HISTORY:    No past medical history on file.     DIET:    DIET GENERAL;     PHYSICAL EXAM:     Patient Vitals for the past 24 hrs:   BP Temp Temp src Pulse Resp SpO2 Height Weight   02/24/21 0811 (!) 165/98 97.8 °F (36.6 °C) Oral 78 18      02/24/21 0804 (!) 165/98   78       02/23/21 2330 (!) 165/98 97.4 °F (36.3 °C) Oral 84 24 94 %     02/23/21 2217     25      02/23/21 1945 (!) 164/98          02/23/21 1533 (!) 169/87  [place]     02/23/21 1505 (!) 183/94 98.3 °F (36.8 °C) Oral 86 18  5' 9\" (1.753 m) (!) 308 lb 12.8 oz (140.1 kg)   @      Intake/Output Summary (Last 24 hours) at 2/24/2021 1304  Last data filed at 2/24/2021 0902  Gross per 24 hour   Intake 420 ml   Output 1600 ml   Net -1180 ml         Wt Readings from Last 3 Encounters:   02/23/21 (!) 308 lb 12.8 oz (140.1 kg)   02/23/21 (!) 307 lb 5.1 oz (139.4 kg)       Constitutional:  Pt is in no acute distress  Head: normocephalic, atraumatic  Neck: no JVD  Cardiovascular: regular rate and rhythm, no murmurs, gallops, or rubs Respiratory:  No rales, rhochi, or wheezes  Gastrointestinal:  Soft, nontender, nondistended, bowel sounds x 4  Ext: no edema  Skin: dry, no rash  Neuro: aaox3    MEDS (scheduled):    potassium chloride  20 mEq Oral TID WC    enoxaparin  40 mg Subcutaneous Daily    amLODIPine  5 mg Oral Daily    aspirin  81 mg Oral Daily    atorvastatin  40 mg Oral Nightly    clopidogrel  75 mg Oral Daily    levothyroxine  75 mcg Oral Daily    losartan  50 mg Oral Daily    mesalamine  800 mg Oral TID    metoprolol succinate  25 mg Oral Daily    predniSONE  20 mg Oral Daily    sertraline  50 mg Oral Daily    spironolactone  50 mg Oral Daily       MEDS (infusions):      MEDS (prn):  acetaminophen **OR** acetaminophen, promethazine **OR** ondansetron, acetaminophen, polyethylene glycol    DATA:    Recent Labs     02/22/21  0731 02/23/21  0443 02/24/21  1106   WBC 6.6 8.4 11.3   HGB 12.5 12.5 13.1   HCT 38.9 39.8 40.6   MCV 89.2 90.2 89.0    177 214     Recent Labs     02/21/21  2351 02/22/21  0731 02/23/21  0443    142 142   K 2.6* 3.0* 3.0*    105 105   CO2 26 26 27   BUN 10 8 9   CREATININE 1.3* 1.1 1.1   LABGLOM 56 >60 >60   GLUCOSE 104* 120* 101*   CALCIUM 8.9 8.4* 8.7   ALT  --  22 26   AST  --  28 36   BILITOT  --  0.6 0.7   ALKPHOS  --  89 89       Lab Results   Component Value Date    LABALBU 3.9 02/23/2021    LABALBU 3.8 02/22/2021    LABALBU 4.0 02/21/2021     Lab Results   Component Value Date    TSH 2.170 02/21/2021       Iron Studies  No results found for: IRON, TIBC, FERRITIN  No results found for: [id number]  No results found for: FOLATE    No results found for: VITD25  No results found for: PTH    No components found for: URIC    No results found for: VOL, APPEARANCE, COLORU, LABSPEC, LABPH, LEUKBLD, NITRU, GLUCOSEU, KETUA, UROBILINOGEN, KETUA, UROBILINOGEN, BILIRUBINUR, OCBU    No results found for: LIPIDPAN      IMPRESSION/RECOMMENDATIONS:    1.  Hypokalemia  Chronic since 2009 per wife Continue potassium supplement  Check urine cr k ratio  Continue aldactone  Follow daily labs     2. Hypertension  BP above goal  Metoprolol and losartan on hold given the recent CVA  Follow    3. Sp cva   r mca cva  Undergoing acute rehab  Left sided weakness    Await todays labs    Marita Martinez.  Olaf Lacey MD

## 2021-02-24 NOTE — PROGRESS NOTES
OCCUPATIONAL THERAPY DAILY NOTE    Date:2021  Patient Name: Que Burk  MRN: [id number]  : 1955  Room: [address] [place] [address]   Referring Practitioner: Adry Walton MD  Diagnosis: CVA- RMCA  Additional Pertinent Hx: HLD, HTN, obese & hypothyroidism    Precautions: Fall Risk, R hemiparesis    Functional Assessment:   Date Status AE  Comments   Feeding 21 Minimal Assist   Per eval   Grooming 21 Maximal Assist   \" \"          Oral Care 21 Maximal Assist   \" \"    Bathing 21 Maximal Assist   \" \"    UB Dressing 21 Dependent   \" \"    LB Dressing 21 Dependent   \" \"    Footwear 21 Dependent   \" \"    Toileting 21 Dependent   \" \"    Homemaking         Functional Transfers / Balance:   Date Status DME  Comments   Sit Balance 21 Stand by Assist      Stand Balance 21 Minimal Assist      [] Tub  [] Shower   Transfer       Commode   Transfer 21 Moderate Assist   Per eval   Functional   Mobility 21 Moderate Assist   \" \"   Other:         Functional Exercises / Activity:   Pt sitting in chair upon arrival to OT gym in PM. Engaged in therex of towel slides (forward,L,R) to increase LUE ROM, 3x15 reps shoulder ad/abduction. Pt used furniture sliders on inclined wedge, 3x15 reps, shoulder flex LUE, with hand over hand assist to increase muscle re-edu and ROM. Pt completed AAROM, 3x15 reps (elbow/shoulder/wrist/digit flex/ext) of LUE. Pt demo'ing difficulty with elbow and shoulder flexion. Min A for SPT w/c-bed and SBA for sit-supine. Pt appeared to have tolerated session well.     Sensory / Neuromuscular Re-Education:      Cognitive Skills:   Status Comments   Problem   Solving fair     Memory fair     Sequencing fair     Safety fair       Visual Perception:    Education:    [] Family teach completed on:    Pain Level: 0/10    Additional Notes: Patient has made good  progress during treatment sessions toward set goals. Therapy emphasis to obtain goals:Strengthening, Endurance Training, Neuromuscular Re-education, Patient/Caregiver Education & Training, ROM, Cognitive Reorientation, Equipment Evaluation, Education, & procurement, Self-Care / ADL, Balance Training, Gait Training, Home Management Training, Functional Mobility Training, Safety Education & Training, Positioning     [x] Continue with current OT Plan of care.   [] Prepare for Discharge     DISCHARGE RECOMMENDATIONS  Recommended DME:    Post Discharge Care:   []Home Independently  []Home with 24hr Care / Supervision []Home with Partial Supervision []Home with Home Health OT []Home with Out Pt OT []Other: ___   Comments:         Time in Time out Tx Time Breakdown  Variance:   First Session  eval  [] Individual Tx-   [] Concurrent Tx -  [] Co-Tx -   [] Group Tx -   [] Time Missed -     Second Session [id number] [x] Individual Tx- 45  [] Concurrent Tx -  [] Co-Tx -   [] Group Tx -   [] Time Missed -     Third Session    [] Individual Tx-   [] Concurrent Tx -  [] Co-Tx -   [] Group Tx -   [] Time Missed -         Total Tx Time: [place], [place], OTR/L [id number]

## 2021-02-24 NOTE — PROGRESS NOTES
Pitch:    Within functional limits  Intensity: Within functional limits  Fluency:  Intact  Prosody Intact    RECEPTIVE LANGUAGE    Comprehension of Yes/No Questions: Within functional limits    Process  Simple Verbal Commands:   Within functional limits  Process Intermediate Verbal Commands:   Within functional limits  Process Complex Verbal Commands:     Within functional limits    Comprehension of Conversation:      Within functional limits      EXPRESSIVE LANGUAGE     Serials: Functional    Imitation:  Words   Functional   Sentences Functional    Naming:  (Modality used:  Verbal)  Confrontation Naming  Functional  Functional Description  Functional  Response Naming: Functional    Conversation:      Conversation was within functional limits    COGNITION     Attention/Orientation  Attention: Sustained attention   Orientation:   Person:  oriented    Place:  oriented    Year:  correct    Month:  accurate within 5 days    Day of Week:  correct    Situation:  accurately described    Memory   Long Term Recall: Address:  recalled without cuing  Birthdate:  recalled without cuing  Age: recalled without cuing  Family: recalled without cuing    Immediate Recall: Sock:  recalled accurately     Blue:  recalled accurately     Bed:  recalled accurately    Delayed Recall:   Sock: could not recall     Blue:  recalled without cuing     Bed:  could not recall    Organization/Problem Solving/Reasoning   Sequencing:    Verbal: Functional      Motor: To be assessed    Problem solving: Verbal: Impaired      Motor: To be assessed    Mathematics:  Simple:  Functional     Complex:  Functional      CLINICAL OBSERVATIONS NOTED DURING THE EVALUATION  Patient was alert and cooperative throughout evaluation. Patient exhibited mild deficits w/ problem solving, reasoning, and STM/recall abilities.        EDUCATION

## 2021-02-24 NOTE — H&P
[address]                  [address] [facility] [place],  [facility]                              HISTORY AND PHYSICAL    PATIENT NAME: Sharon Martin                          :        1955  MED REC NO:   [id number]                            ROOM:       550  ACCOUNT NO:   [de-identified]                           ADMIT DATE: 2021  PROVIDER:     Marilyn Eddy MD    REHAB HISTORY AND PHYSICAL    AGE:  60. SEX:  Male. CHIEF COMPLAINT:  CVA. HISTORY OF PRESENT ILLNESS:  The patient had a CVA about three years ago  with left hemiparesis. He was visiting his daughter in the [facility] when he developed similar symptoms and was brought to [address] [place] [address] on 2021. He was not a candidate for tPA. MRI showed  a right middle cerebral artery stroke. He was admitted and stabilized  and is felt to be a good candidate for further rehab. He is still at a  mod to max assist level with most of his functioning. He had been  independent before. PAST MEDICAL HISTORY:  1.  Prior CVA. 2.  Hyperlipidemia. 3.  Hypertension. 4.  Hypothyroidism . 5. Hypokalemia while here in the hospital.    CURRENT MEDICATIONS:  Norvasc, aspirin, Lipitor, Plavix, Lovenox,  Synthroid, Cozaar, mesalamine, metoprolol, potassium, prednisone,  Zoloft, and Aldactone. ALLERGIES:  None known. SOCIAL HISTORY:  He lives with his wife in [facility], but they are  staying with his daughter locally in [facility] currently. REVIEW OF SYSTEMS:  See prior consult. PHYSICAL EXAMINATION:  GENERAL:  Morbidly obese white male, in no acute distress. HEENT:  Head:  Normocephalic, atraumatic. Eyes:  Pupils are equal,  round, and reactive to light. Pharynx normal.  LUNGS:  Clear to P and A. HEART:  Regular rate and rhythm. S1, S2 normal.  No murmurs or gallops. ABDOMEN:  Bowel sounds normal.  Soft, nontender. No masses or  organomegaly.   EXTREMITIES:  Without clubbing, cyanosis or edema. MENTAL STATUS:  Alert and oriented. SENSATION:  Diminished on the left. NEUROMUSCULAR:  4+/5 on the right. Left side is 4-/5 and it is better  than my initial eval.    PROBLEM LIST:  1. CVA with left hemiparesis. 2.  Morbid obesity. 3.  Hypertension. 4.  Hyperlipidemia. 5.  Prior CVA with left hemiparesis. INDIVIDUALIZED OVERALL PLAN OF CARE:  I think the patient is a good  candidate for further rehab. We should see ongoing progress. The plan  is to get him functioning well enough to go initially back to his  daughter's home and then back to his home at [facility]. I will continue  to monitor the electrolytes especially the potassium. He is right now  on high-dose potassium with Aldactone and I want to be cautious of that,  so that we do not develop hyperkalemia. Rehab prognosis is good. Medical prognosis is good. PT will be working on ambulation, transfers and advanced transfers an  hour and a half, five to seven days a week with goals of supervisory to  standby. OT will be working on upper extremity strengthening,  functioning, ADL skills and basic homemaking an hour and a half, five to  seven days a week with goals of supervisory to standby. He will have  24-hour-a day, 7-day-a-week rehab nursing to address bowel and bladder  issues, carryover from therapy and safety. Speech will do an initial  eval for any dysarthria or dysphagia. Overall length of stay will probably be about two weeks with the patient  going to his daughter's home at discharge.         Karina Montoya MD    D: 02/24/2021 7:45:28       T: 02/24/2021 7:52:22     [email or web address]  Job#: [id number]     Doc#: [id number]    CC:

## 2021-02-24 NOTE — PROGRESS NOTES
Ambulation    75 feet with R LBQC with Cassi (WC follow)  75 feet x 2 reps with R LBQC with Cassi  150 feet with AAD with  feet with AAD with Mod I   Walking 10 feet on uneven surface TBA  NT 10 feet with AAD with SBA 10 feet with AAD with Mod I   Wheel Chair Mobility NA  NT     Car Transfers Cassi with R LBQC  NT SBA Mod I   Stair negotiation: ascended and descended  4 steps with R rail with Cassi  8 steps with 1 HR with Cassi (reciprocal ascent, NR descent) 8 steps with 1 rail with SBA 12 steps with 1 rail with Mod I   Curb Step:   ascended and descended TBA  NT 7.5 inch step with AAD and SBA 7.5 inch step with AAD and Mod I   Picking up object off the floor TBA  NT Will  2lb weight with SB assist Will  4lb weight with Mod I   BLE ROM WNL       BLE Strength RLE grossly 5/5  L hip flexion 3+/5  L knee extension 4/5  L ankle DF/PF 4/5       Balance  Static and dynamic standing balance Cassi with R LBQC       Date Family Teach Completed        Is additional Family Teaching Needed?   Y or N Y       Hindering Progress Hemiparesis       PT recommended ELOS 3 weeks       Team's Discharge Plan        Therapist at Team Meeting          Therapeutic Exercise:   PM: ambulation with VALERIA walker 275 feet x 1 rep with Cassi to maintain direct path (verbal cueing for heel strike)  Weaving between 4 standing quad canes with R LBQC with Cassi   Sit<>Stand transfer x 5 reps from DAVIDA Fuentes 23    Patient education  Pt educated on safe hand placement with transfers and functional implications    Patient response to education:   Pt verbalized understanding Pt demonstrated skill Pt requires further education in this area   yes partial yes Additional Comments: Pt exhibits improving activity tolerance, requires frequent verbal cueing to maintain proper sequencing with [facility], to maintain safe step length of RLE, and to maintain neutral alignment of LBQC and trunk rotation. Verbal cueing required for postural correction. VC required for placement of entire LLE on stairs with reciprocal ascent. NR pattern remains safest option at this time. Plan to progress gait training with emphasis on neutral trunk alignment and postural correction. PM  Time in: 1345  Time out: 1430    Pt is making good progress toward established Physical Therapy goals. Continue with physical therapy current plan of care.     Ja Champion, PT, DPT  [id number]

## 2021-02-25 PROCEDURE — 6360000002 HC RX W HCPCS: Performed by: INTERNAL MEDICINE

## 2021-02-25 PROCEDURE — 97112 NEUROMUSCULAR REEDUCATION: CPT

## 2021-02-25 PROCEDURE — 6370000000 HC RX 637 (ALT 250 FOR IP): Performed by: PHYSICAL MEDICINE & REHABILITATION

## 2021-02-25 PROCEDURE — 94660 CPAP INITIATION&MGMT: CPT

## 2021-02-25 PROCEDURE — 6370000000 HC RX 637 (ALT 250 FOR IP): Performed by: INTERNAL MEDICINE

## 2021-02-25 PROCEDURE — 97129 THER IVNTJ 1ST 15 MIN: CPT

## 2021-02-25 PROCEDURE — 1280000000 HC REHAB R&B

## 2021-02-25 PROCEDURE — 97530 THERAPEUTIC ACTIVITIES: CPT

## 2021-02-25 PROCEDURE — 97130 THER IVNTJ EA ADDL 15 MIN: CPT

## 2021-02-25 PROCEDURE — 97110 THERAPEUTIC EXERCISES: CPT

## 2021-02-25 RX ADMIN — MESALAMINE 800 MG: 400 CAPSULE, DELAYED RELEASE ORAL at 08:34

## 2021-02-25 RX ADMIN — ASPIRIN 81 MG: 81 TABLET, CHEWABLE ORAL at 08:34

## 2021-02-25 RX ADMIN — AMLODIPINE BESYLATE 5 MG: 5 TABLET ORAL at 08:31

## 2021-02-25 RX ADMIN — METOPROLOL SUCCINATE 25 MG: 25 TABLET, EXTENDED RELEASE ORAL at 08:31

## 2021-02-25 RX ADMIN — SPIRONOLACTONE 50 MG: 25 TABLET ORAL at 08:37

## 2021-02-25 RX ADMIN — CLOPIDOGREL 75 MG: 75 TABLET, FILM COATED ORAL at 08:31

## 2021-02-25 RX ADMIN — POTASSIUM CHLORIDE 20 MEQ: 1500 TABLET, EXTENDED RELEASE ORAL at 11:42

## 2021-02-25 RX ADMIN — SERTRALINE 50 MG: 50 TABLET, FILM COATED ORAL at 08:37

## 2021-02-25 RX ADMIN — POTASSIUM CHLORIDE 20 MEQ: 1500 TABLET, EXTENDED RELEASE ORAL at 17:08

## 2021-02-25 RX ADMIN — POTASSIUM CHLORIDE 20 MEQ: 1500 TABLET, EXTENDED RELEASE ORAL at 08:34

## 2021-02-25 RX ADMIN — MESALAMINE 800 MG: 400 CAPSULE, DELAYED RELEASE ORAL at 13:30

## 2021-02-25 RX ADMIN — PREDNISONE 20 MG: 20 TABLET ORAL at 08:36

## 2021-02-25 RX ADMIN — ATORVASTATIN CALCIUM 40 MG: 40 TABLET, FILM COATED ORAL at 20:13

## 2021-02-25 RX ADMIN — ENOXAPARIN SODIUM 40 MG: 40 INJECTION SUBCUTANEOUS at 08:35

## 2021-02-25 RX ADMIN — MESALAMINE 800 MG: 400 CAPSULE, DELAYED RELEASE ORAL at 20:13

## 2021-02-25 RX ADMIN — LOSARTAN POTASSIUM 50 MG: 50 TABLET, FILM COATED ORAL at 08:36

## 2021-02-25 RX ADMIN — LEVOTHYROXINE SODIUM 75 MCG: 0.07 TABLET ORAL at 06:02

## 2021-02-25 ASSESSMENT — PAIN SCALES - GENERAL: PAINLEVEL_OUTOF10: 0

## 2021-02-25 NOTE — PROGRESS NOTES
Speech Language Pathology  ACUTE REHABILITATION--DAILY PROGRESS NOTE          PATIENT NAME:  Priyanka Wagner      :  1955         TODAY'S DATE:  2021       ROOM:  [address] [place] [address]      ADMITTING DIAGNOSIS: Acute CVA (cerebrovascular accident) ([facility] [address] [I63.9]     SPEECH PATHOLOGY DIAGNOSIS:    Mild cognitive-linguistic deficits     THERAPY RECOMMENDATIONS:   Speech Pathology intervention is recommended 3-6 times per week for LOS or when goals are met with emphasis on the following:      Immediate/ STM for functional information to complete tasks as instructed and recall pertinent medical information with minimal and/or use of external memory aide training.   Problem solving/ insight/ judgement for various ADL/iADL tasks, including but not limited to: medication management and money/ finance management, overall safety awareness in the PLOF with 80% accuracy                                                                                                                                        FIMS SCORES                            Swallowing                          Current Status             7--Independent                         Short Term Goal         7--Independent                         Long Term Goal          7--Independent                Receptive                          Current Status             7--Independent                         Short Term Goal         7--Independent                         Long Term Goal          7--Independent                Expressive                          Current Status             7--Independent                         Short Term Goal         7--Independent                         Long Term Goal          7--Independent                Social Interaction                          Current Status             7--Independent                         Short Term Goal         7--Independent                         Long Term Goal          7--Independent Problem Solving                          Current Status             5--Supervision               Short Term Goal         6--Modified Independent                       Long Term Goal          6--Modified Independent                          Memory                          Current Status             5--Supervision               Short Term Goal         6--Modified Independent                       Long Term Goal          6--Modified Independent                              SWALLOWING:      Diet:  Regular consistency solids with thin liquids     Patient does not require supervision during meals. LANGUAGE:     WFL     COGNITION:       When identifying problems and creating solutions for hazardous scenarios in pictures, Patient gave satisfactory responses with ~90% accuracy with minimal cues. Patient had fair recall of hobbies and activities in therapy. Safety:  fair +    SPEECH:     WFL    SP recommended after discharge:  no  Supervision recommended at discharge: Intermittent    Will continue SP intervention as per previously established POC.     Minute Tracking:    Individual therapy:   45 minutes  Concurrent therapy:    0 minutes  Group therapy:     0 minutes  Co-treatment therapy:    0 minutes    Total minutes for 2/25/2021: 45 minutes    Pennie Yun   SLP student intern

## 2021-02-25 NOTE — PROGRESS NOTES
Physical Therapy    Facility/Department: [address] REHAB  Daily Treatment Note    NAME: Sherren Philips  : 1955  MRN: [id number]    Date of Service: 2021    Evaluating Therapist: Jose Wesley PT, DPT    ROOM: [facility]  DIAGNOSIS: CVA  PRECAUTIONS: falls, L hemiparesis (UE worse than LE), sling, hx oral cancer, hx CVA in 2016 with mild chronic L hemiparesis, hx R meniscus repair in 2016, TSM, general diet  HPI: Pt is a 60year old male who developed sudden onset of left hemiparesis and was brought to [facility]. Cherise Simple on 2021. He was not felt to be a candidate for t-PA. MRI brain not able to be done due to patient's BMI. CT perfusion showed small region of ischemic penumbra in the distribution of the R HEMA. Mobile unit MRI showed findings consistent with acute infarct in the right middle cerebral artery. He was put on antiplatelet agents. Social:  Pt is in town from [facility] with wife, visiting and staying with his daughter and her family ( and 25year old son, all work). In [facility], pt lives with wife in a 2 floor plan with 1 ANITA + 3 steps without HR to main floor. Bedroom and bathroom on first floor. Laundry in basement with full flight to reach. Wife able to provide supervision as needed. Pt may return to daughter's home which is a split level with 1 ANITA + 4 steps with R HR to reach main floor where bedroom and bathroom are located. Prior to admission: Pt ambulated with R SPC, was functionally inde     Initial Evaluation  21 AM     PM    Short Term Goals Long Term Goals    Was pt agreeable to Eval/treatment? yes yes yes     Does pt have pain?  No c/o pain No c/o No c/o pain     Bed Mobility  Rolling: Cassi  Supine to sit: Cassi  Sit to supine: Cassi  Scooting: Cassi  Supine to sit SBA  Sit to supine SBA  Scooting SBA SBA Mod I   Transfers Sit to stand: Cassi  Stand to sit: Cassi  Stand pivot: Cassi with R LBQC Sit to stand CGA  Stand to sit Cassi  Stand pivot Cassi Sit<>Stand CGA [de-identified] Cassi with R LBQC SBA Mod I   Ambulation    75 feet with R LBQC with Cassi (WC follow) 75 feet x2 reps with LBQC Cassi see comments 75 feet x 1 reps with R LBQC with Cassi  150 feet with AAD with  feet with AAD with Mod I   Walking 10 feet on uneven surface TBA  NT 10 feet with AAD with SBA 10 feet with AAD with Mod I   Wheel Chair Mobility NA  NT     Car Transfers Cassi with R LBQC Cassi NT SBA Mod I   Stair negotiation: ascended and descended  4 steps with R rail with Cassi 4 steps 1 rail Cassi  8 steps with 1 rail with SBA 12 steps with 1 rail with Mod I   Curb Step:   ascended and descended TBA N/T NT 7.5 inch step with AAD and SBA 7.5 inch step with AAD and Mod I   Picking up object off the floor TBA  NT Will  2lb weight with SB assist Will  4lb weight with Mod I   BLE ROM WNL       BLE Strength RLE grossly 5/5  L hip flexion 3+/5  L knee extension 4/5  L ankle DF/PF 4/5       Balance  Static and dynamic standing balance Cassi with R LBQC       Date Family Teach Completed        Is additional Family Teaching Needed? Y or N Y       Hindering Progress Hemiparesis       PT recommended ELOS 3 weeks       Team's Discharge Plan        Therapist at Team Meeting          Therapeutic Exercise:   A.M. sit <> stand x5 reps  PM: supine ex heel slides 2x15 4# B , SAQS 2x15 4# B,sit <> stand x4 reps on mat table at different heights. Patient education  Pt educated on safe hand placement with transfers and functional implications    Patient response to education:   Pt verbalized understanding Pt demonstrated skill Pt requires further education in this area   yes partial yes     Additional Comments:  Improved transfers from sit to stand. Pt still requires cues for sequencing during amb. Pt had 3 LOB episodes requiring ModA to correct during amb this a.m.   Pt performed Reciprocal technique when performing steps this a.m. ascending even with cues to perform NR. P.m. Pt with improved bed mobility. Pt had mild LOB during amb this p.m. requiring Cassi to correct. A.M. Time In : 8:35  Time Out: 9:15  PM  Time in: 1345  Time out: 1430    Pt is making good progress toward established Physical Therapy goals. Continue with physical therapy current plan of care.     Loulou Tomlinson [id number]

## 2021-02-25 NOTE — PROGRESS NOTES
OCCUPATIONAL THERAPY DAILY NOTE    Date:2021  Patient Name: Meghan Escobar  MRN: [id number]  : 1955  Room: [address] [place] [address]   Referring Practitioner: Karina Berrios MD  Diagnosis: CVA- RMCA  Additional Pertinent Hx: HLD, HTN, obese & hypothyroidism    Precautions: Fall Risk, R hemiparesis    Functional Assessment:   Date Status AE  Comments   Feeding 21 Minimal Assist      Grooming 21 Maximal Assist            Oral Care 21 Maximal Assist      Bathing 21 Maximal Assist      UB Dressing 21 Dependent      LB Dressing 21 Dependent      Footwear 21 Dependent      Toileting 21 Dependent      Homemaking         Functional Transfers / Balance:   Date Status DME  Comments   Sit Balance 21 Stand by Assist      Stand Balance 21 Minimal Assist      [] Tub  [] Shower   Transfer       Commode   Transfer 21 Moderate Assist      Functional   Mobility 21 Moderate Assist      Other:         Functional Exercises / Activity:  Pt sitting in chair upon arrival to OT gym. Engaged in weightbearing through LUE, while standing at table with inclined wedge and 3# weight on forearm to increase neuromuscular input and standing balance/tolerance. Pt able to stand 3x5 mins at [address] [place] [address]. Min A for SPT w/c-mat table. Pt layed supine and completed AAROM of elbow/shoulder flexion, 3x15 reps, gravity eliminated compared to sitting upright in chair. Pt demonstrated no active shoulder flex, only shrugs. Pt able to complete elbow flex 45-90' actively. Pt appeared to have tolerated session well. Pt sitting in chair upon arrival to OT gym during 2nd session. Min A for SPT chair-mat. Engaged in therex, seated, using ROM arc to increase LUE ROM for ease with ADLs. Pt able to complete all rings using LUE with slight assist from RUE. Pt completed cone stacking activity of reaching for cones at low height and placing onto stand to increase LUE gross grasp, coordination, and ROM. Pt did not require any physical assist. Pt able to stack 15 cones with increased time. Pt demonstrated improved active elbow flex and shoulder adduction. Shoulder flex grossly 75' intermittently during session. Pt limited near end of session due to fatigue. Sensory / Neuromuscular Re-Education:      Cognitive Skills:   Status Comments   Problem   Solving fair     Memory fair     Sequencing fair     Safety fair       Visual Perception:    Education:    [] Family teach completed on:    Pain Level: 0/10    Additional Notes:       Patient has made good  progress during treatment sessions toward set goals. Therapy emphasis to obtain goals:Strengthening, Endurance Training, Neuromuscular Re-education, Patient/Caregiver Education & Training, ROM, Cognitive Reorientation, Equipment Evaluation, Education, & procurement, Self-Care / ADL, Balance Training, Gait Training, Home Management Training, Functional Mobility Training, Safety Education & Training, Positioning     [x] Continue with current OT Plan of care.   [] Prepare for Discharge     DISCHARGE RECOMMENDATIONS  Recommended DME:    Post Discharge Care:   []Home Independently  []Home with 24hr Care / Supervision []Home with Partial Supervision []Home with Home Health OT []Home with Out Pt OT []Other: ___   Comments:         Time in Time out Tx Time Breakdown  Variance:   First Session  [id number] [x] Individual Tx-45   [] Concurrent Tx -  [] Co-Tx -   [] Group Tx -   [] Time Missed -     Second Session 1567 1610 [x] Individual Tx-45   [] Concurrent Tx -  [] Co-Tx -   [] Group Tx - [] Time Missed -     Third Session    [] Individual Tx-   [] Concurrent Tx -  [] Co-Tx -   [] Group Tx -   [] Time Missed -         Total Tx Time: Kassie [address], [address] [facility], OTR/L [id number]

## 2021-02-25 NOTE — PROGRESS NOTES
Progress Note  Date:2021       Room:[facility]  Patient Rama Goldberg     YOB: 1955     Age:65 y.o. Subjective    Subjective:  Symptoms:  Stable. He reports weakness. Diet:  Adequate intake. Activity level: Impaired due to weakness. Pain:  He reports no pain. Review of Systems   Neurological: Positive for weakness. Objective         Vitals Last 24 Hours:  TEMPERATURE:  No data recorded  RESPIRATIONS RANGE: No data recorded  PULSE OXIMETRY RANGE: No data recorded  PULSE RANGE: Pulse  Av  Min: 67  Max: 67  BLOOD PRESSURE RANGE: Systolic (41GYG), OWU:732 , Min:134 , EGA:300   ; Diastolic (74GJZ), OGC:40, Min:74, Max:74    I/O (24Hr): Intake/Output Summary (Last 24 hours) at 2021 0835  Last data filed at 2021 0150  Gross per 24 hour   Intake 540 ml   Output 500 ml   Net 40 ml     Objective:  General Appearance: In no acute distress. Vital signs: (most recent): Blood pressure 134/74, pulse 67, temperature 97.8 °F (36.6 °C), temperature source Oral, resp. rate 18, height 5' 9\" (1.753 m), weight (!) 308 lb 12.8 oz (140.1 kg), SpO2 94 %. (Occasional high blood pressure). Output: Producing urine and producing stool. Lungs:  Normal effort and normal respiratory rate. Breath sounds clear to auscultation. Heart: Normal rate. Regular rhythm. S1 normal and S2 normal.    Abdomen: Abdomen is soft. Bowel sounds are normal.   There is no abdominal tenderness. Extremities: Normal range of motion. Neurological: Patient is alert. (4/5 strength).       Labs/Imaging/Diagnostics    Labs:  CBC:  Recent Labs     21  0443 21  1106   WBC 8.4 11.3   RBC 4.41 4.56   HGB 12.5 13.1   HCT 39.8 40.6   MCV 90.2 89.0   RDW 13.6 13.8    214     CHEMISTRIES:  Recent Labs     21  0443 21  1106    139   K 3.0* 3.8    105   CO2 27 24   BUN 9 12   CREATININE 1.1 1.0   GLUCOSE 101* 99 Transthoracic Echocardiography Report (TTE)  Demographics   Patient Name         Maranda Cope   Gender                Male   Medical Record       [id number]    Room Number           8513  Number   Account #            [de-identified]   Procedure Date        02/23/2021   Corporate ID                     Ordering Physician    Josie Orlando   Accession Number     [id number]  Referring Physician   Date of Birth        1955  Sonographer           Geremias POPE   Age                  60 year(s)  Interpreting          Tequila Castillo MD                                   Physician                                    Any Other  Procedure Type of Study   TTE procedure:Echo W/Bubble Study. Procedure Date Date: 02/23/2021 Start: 11:50 AM Study Location: Portable Technical Quality: Limited visualization due to body habitus. Indications:CVA. Patient Status: Pending Discharge Height: 69 inches Weight: 320 pounds BSA: 2.52 m^2 BMI: 47.26 kg/m^2 Rhythm: Within normal limits HR: 81 bpm BP: 164/93 mmHg  Findings   Left Ventricle  Left ventricle size is normal.  Concentric left ventricular hypertrophy. Normal left ventricular systolic function. Ejection fraction is visually estimated at 60%. There is doppler evidence of stage I diastolic dysfunction. Right Ventricle  Normal right ventricular size and function (TAPSE 2.5 cm). Left Atrium  Normal sized left atrium by volume index. No apparent interatrial shunting on bubble study (suboptimal  visualization). Right Atrium  Normal sized right atrium. Mitral Valve  Physiologic and/or trace mitral regurgitation. No evidence of hemodynamically significant mitral stenosis. Tricuspid Valve  Physiologic and/or trace tricuspid regurgitation. Aortic Valve  No evidence of hemodynamically significant aortic regurgitation or  stenosis. Pulmonic Valve  The pulmonic valve was not well visualized. Pericardial Effusion  No evidence of a hemodynamically significant pericardial effusion. Aorta  Aortic root dimension within normal limits. Conclusions   Summary  Normal left ventricular systolic function. Ejection fraction is visually estimated at 60%. Normal right ventricular size and function (TAPSE 2.5 cm). There is doppler evidence of stage I diastolic dysfunction. No apparent interatrial shunting on bubble study (suboptimal  visualization). Signature   ----------------------------------------------------------------  Electronically signed by Bria Anthony MD(Interpreting  physician) on 02/23/2021 01:41 PM  ----------------------------------------------------------------  M-Mode/2D Measurements & Calculations   LV Diastolic     LV Systolic Dimension: 3 cm     AV Cusp Separation: 2.1  Dimension: 4.8   LV Volume Diastolic: 325.5 ml   cmAO Root Dimension: 3.5  cm               LV Volume Systolic: 20.0 ml     cm  LV FS:37.5 %     LV EDV/LV EDV Index: 674.4  LV PW Diastolic: TQ/75 DB/T^8EP ESV/LV ESV  1.1 cm           Index: 35.2 ml/14ml/ m^2  LV PW Systolic:  EF Calculated: 67 %             RV Diastolic Dimension:  1.6 cm           LV Mass Index: 98 l/min*m^2     2.7 cm  Septum           LV Length: 9 cm  Diastolic: 1.5                                   Ascending Aorta: 3.6 cm  cm               LVOT: 2.5 cm                    LA volume/Index: 52.1 ml  Septum Systolic:                                 /20.68ml/m^2  1. 6 cm                                           RA Area: 11.9 cm^2  CO: 5.96 l/min  CI: 2.37 l/m*m^2  LV Mass: 245.73  g  Doppler Measurements & Calculations   MV Peak E-Wave:    AV Peak Velocity: 1.35 m/s      LVOT Peak Velocity:  0.61 m/s           AV Peak Gradient: 7.3 mmHg      0.78 m/s  MV Peak A-Wave:    AV Mean Velocity: 1.02 m/s      LVOT Mean Velocity:  1.09 m/s           AV Mean Gradient: 4.5 mmHg      0.54 m/s  MV E/A Ratio: 0.56 AV VTI: 25.6 cm                 LVOT Peak Gradient: 2.4  MV Peak Gradient:  AV Area (Continuity):2.87 cm^2  mmHgLVOT Mean Gradient:  4.4 mmHg

## 2021-02-26 LAB
ANION GAP SERPL CALCULATED.3IONS-SCNC: 8 MMOL/L (ref 7–16)
BUN BLDV-MCNC: 17 MG/DL (ref 8–23)
CALCIUM SERPL-MCNC: 8.7 MG/DL (ref 8.6–10.2)
CHLORIDE BLD-SCNC: 105 MMOL/L (ref 98–107)
CO2: 28 MMOL/L (ref 22–29)
CREAT SERPL-MCNC: 1.2 MG/DL (ref 0.7–1.2)
GFR AFRICAN AMERICAN: >60
GFR NON-AFRICAN AMERICAN: >60 ML/MIN/1.73
GLUCOSE BLD-MCNC: 100 MG/DL (ref 74–99)
POTASSIUM SERPL-SCNC: 3.2 MMOL/L (ref 3.5–5)
SODIUM BLD-SCNC: 141 MMOL/L (ref 132–146)

## 2021-02-26 PROCEDURE — 97530 THERAPEUTIC ACTIVITIES: CPT

## 2021-02-26 PROCEDURE — 1280000000 HC REHAB R&B

## 2021-02-26 PROCEDURE — 36415 COLL VENOUS BLD VENIPUNCTURE: CPT

## 2021-02-26 PROCEDURE — 6370000000 HC RX 637 (ALT 250 FOR IP): Performed by: PHYSICAL MEDICINE & REHABILITATION

## 2021-02-26 PROCEDURE — 97130 THER IVNTJ EA ADDL 15 MIN: CPT

## 2021-02-26 PROCEDURE — 6370000000 HC RX 637 (ALT 250 FOR IP): Performed by: INTERNAL MEDICINE

## 2021-02-26 PROCEDURE — 80048 BASIC METABOLIC PNL TOTAL CA: CPT

## 2021-02-26 PROCEDURE — 97535 SELF CARE MNGMENT TRAINING: CPT

## 2021-02-26 PROCEDURE — 97110 THERAPEUTIC EXERCISES: CPT

## 2021-02-26 PROCEDURE — 97112 NEUROMUSCULAR REEDUCATION: CPT

## 2021-02-26 PROCEDURE — 97129 THER IVNTJ 1ST 15 MIN: CPT

## 2021-02-26 PROCEDURE — 94660 CPAP INITIATION&MGMT: CPT

## 2021-02-26 PROCEDURE — 6360000002 HC RX W HCPCS: Performed by: INTERNAL MEDICINE

## 2021-02-26 RX ADMIN — MESALAMINE 800 MG: 400 CAPSULE, DELAYED RELEASE ORAL at 20:33

## 2021-02-26 RX ADMIN — CLOPIDOGREL 75 MG: 75 TABLET, FILM COATED ORAL at 08:10

## 2021-02-26 RX ADMIN — POTASSIUM CHLORIDE 30 MEQ: 1500 TABLET, EXTENDED RELEASE ORAL at 16:41

## 2021-02-26 RX ADMIN — LEVOTHYROXINE SODIUM 75 MCG: 0.07 TABLET ORAL at 06:14

## 2021-02-26 RX ADMIN — AMLODIPINE BESYLATE 5 MG: 5 TABLET ORAL at 08:10

## 2021-02-26 RX ADMIN — ENOXAPARIN SODIUM 40 MG: 40 INJECTION SUBCUTANEOUS at 08:09

## 2021-02-26 RX ADMIN — PREDNISONE 20 MG: 20 TABLET ORAL at 08:10

## 2021-02-26 RX ADMIN — SERTRALINE 50 MG: 50 TABLET, FILM COATED ORAL at 08:10

## 2021-02-26 RX ADMIN — ASPIRIN 81 MG: 81 TABLET, CHEWABLE ORAL at 08:10

## 2021-02-26 RX ADMIN — MESALAMINE 800 MG: 400 CAPSULE, DELAYED RELEASE ORAL at 08:11

## 2021-02-26 RX ADMIN — POTASSIUM CHLORIDE 30 MEQ: 1500 TABLET, EXTENDED RELEASE ORAL at 08:10

## 2021-02-26 RX ADMIN — MESALAMINE 800 MG: 400 CAPSULE, DELAYED RELEASE ORAL at 14:35

## 2021-02-26 RX ADMIN — SPIRONOLACTONE 50 MG: 25 TABLET ORAL at 08:11

## 2021-02-26 RX ADMIN — ATORVASTATIN CALCIUM 40 MG: 40 TABLET, FILM COATED ORAL at 20:33

## 2021-02-26 RX ADMIN — METOPROLOL SUCCINATE 25 MG: 25 TABLET, EXTENDED RELEASE ORAL at 08:10

## 2021-02-26 RX ADMIN — POTASSIUM CHLORIDE 30 MEQ: 1500 TABLET, EXTENDED RELEASE ORAL at 12:31

## 2021-02-26 RX ADMIN — LOSARTAN POTASSIUM 50 MG: 50 TABLET, FILM COATED ORAL at 08:10

## 2021-02-26 NOTE — PROGRESS NOTES
Speech Language Pathology  ACUTE REHABILITATION--DAILY PROGRESS NOTE          PATIENT NAME:  Naye Mendes      :  1955         TODAY'S DATE:  2021       ROOM:  [address] [place] [address]      ADMITTING DIAGNOSIS: Acute CVA (cerebrovascular accident) ([facility] [address] [I63.9]     SPEECH PATHOLOGY DIAGNOSIS:    Mild cognitive-linguistic deficits     THERAPY RECOMMENDATIONS:   Speech Pathology intervention is recommended 3-6 times per week for LOS or when goals are met with emphasis on the following:      Immediate/ STM for functional information to complete tasks as instructed and recall pertinent medical information with minimal and/or use of external memory aide training.   Problem solving/ insight/ judgement for various ADL/iADL tasks, including but not limited to: medication management and money/ finance management, overall safety awareness in the PLOF with 80% accuracy                                                                                                                                        FIMS SCORES                            Swallowing                          Current Status             7--Independent                         Short Term Goal         7--Independent                         Long Term Goal          7--Independent                Receptive                          Current Status             7--Independent                         Short Term Goal         7--Independent                         Long Term Goal          7--Independent                Expressive                          Current Status             7--Independent                         Short Term Goal         7--Independent                         Long Term Goal          7--Independent                Social Interaction                          Current Status             7--Independent                         Short Term Goal         7--Independent                         Long Term Goal          7--Independent Problem Solving                          Current Status             5--Supervision               Short Term Goal         6--Modified Independent                       Long Term Goal          6--Modified Independent                          Memory                          Current Status             5--Supervision               Short Term Goal         6--Modified Independent                       Long Term Goal          6--Modified Independent                              SWALLOWING:      Diet:  Regular consistency solids with thin liquids     Patient does not require supervision during meals. LANGUAGE:     WFL     COGNITION:       Patient listened to voicemail samples and short stories containing ~4 sentences. When recalling details in order to respond to questions related to materials, Patient was ~90% accurate with min-moderate cues. Patient benefited from being given a field of three choices to answer questions. Patient had fair- insight on why it would be important to listen to and write down information from voicemail messages. Patient was 78% accurate recalling locations of icons on a 4x5 picture grid in order to create picture matches. Patient benefited from cues to scan locations in a systematic manner instead of randomly selecting icons. Safety:  fair +    SPEECH:     WFL    SP recommended after discharge:  no  Supervision recommended at discharge: Intermittent    Will continue SP intervention as per previously established POC.     Minute Tracking:    Individual therapy:   45 minutes  Concurrent therapy:    0 minutes  Group therapy:     0 minutes  Co-treatment therapy:    0 minutes    Total minutes for 2/26/2021: 45 minutes    Linda Mednez   SLP student intern

## 2021-02-26 NOTE — PROGRESS NOTES
OCCUPATIONAL THERAPY DAILY NOTE    Date:2021  Patient Name: Sadi Talley  MRN: [id number]  : 1955  Room: [address] [place] [address]   Referring Practitioner: Lacie Martines MD  Diagnosis: CVA- RMCA  Additional Pertinent Hx: HLD, HTN, obese & hypothyroidism    Precautions: Fall Risk, R hemiparesis    Functional Assessment:   Date Status AE  Comments   Feeding 21 SBA  intermittent assist opening packages, pt able to self feed using R hand   Grooming 21 Maximal Assist            Oral Care 21 Maximal Assist      Bathing 21 Maximal Assist      UB Dressing 21 Dependent      LB Dressing 21 Dependent      Footwear 21 Dependent      Toileting 21 Dependent      Homemaking         Functional Transfers / Balance:   Date Status DME  Comments   Sit Balance 21 Stand by Assist      Stand Balance 21 Minimal Assist      [] Tub  [] Shower   Transfer       Commode   Transfer 21 Min A  SPT from w/c   Functional   Mobility 21 Moderate Assist      Other:         Functional Exercises / Activity:  Pt sitting in chair upon arrival to . Completed commode transfer. In OT gym, Engaged in therex of towel slides while standing at table (forward, R, L)  to increase LUE ROM, 3x15 reps shoulder flex/add/abduction. Therapist completed scapular mobilizations, 4x10 reps while pt stood completing towel slides, to LUE to maintain joint integrity for ease with ADLs. Pt reported no discomfort. Pt sitting in chair upon arrival to OT gym during 2nd session. Engaged in placing/removing yellow-blue resistance clips using L hand to increase ROM/strength. Pt required intermittent physical assist to pronate forearm. Pt able to actively flex shoulder. Engaged in overheard arm pulleys, 2x3 mins, to increase BUE ROM for ease with ADLs. Engaged in therex using red eliza, 3x15 reps (supination/pronation) to increase BUE strength for ease with functional transfers. Engaged in therex, seated, using 1# dowel chaka, 3x10 reps (shoulder flex), to increase BUE strength for ease with functional transfers. Therapist used massager on pt's LUE to elicit neuromuscular response ~15 mins. Pt appeared to demonstrate F+ results with bicep/tricep. No results with wrist. Pt reported no irritation or discomfort with massage. Pt appeared to have tolerated session well. Sensory / Neuromuscular Re-Education:      Cognitive Skills:   Status Comments   Problem   Solving fair     Memory fair     Sequencing fair     Safety fair       Visual Perception:    Education:    [] Family teach completed on:    Pain Level: 0/10    Additional Notes:       Patient has made good  progress during treatment sessions toward set goals. Therapy emphasis to obtain goals:Strengthening, Endurance Training, Neuromuscular Re-education, Patient/Caregiver Education & Training, ROM, Cognitive Reorientation, Equipment Evaluation, Education, & procurement, Self-Care / ADL, Balance Training, Gait Training, Home Management Training, Functional Mobility Training, Safety Education & Training, Positioning     [x] Continue with current OT Plan of care.   [] Prepare for Discharge     DISCHARGE RECOMMENDATIONS  Recommended DME:    Post Discharge Care:   []Home Independently  []Home with 24hr Care / Supervision []Home with Partial Supervision []Home with Home Health OT []Home with Out Pt OT []Other: ___   Comments:         Time in Time out Tx Time Breakdown  Variance: First Session  [phone] [x] Individual Tx-20  [] Concurrent Tx -  [] Co-Tx -   [] Group Tx -   [] Time Missed -     Second Session [phone] [x] Individual Tx- 30  [x] Concurrent Tx -45  [] Co-Tx -   [] Group Tx -   [] Time Missed -     Third Session    [] Individual Tx-   [] Concurrent Tx -  [] Co-Tx -   [] Group Tx -   [] Time Missed -         Total Tx Time: Mary 73, [facility], OTR/L [id number]

## 2021-02-26 NOTE — PROGRESS NOTES
Physical Therapy    Facility/Department: [address] REHAB  Daily Treatment Note    NAME: Priyanka Wagner  : 1955  MRN: [id number]    Date of Service: 2021    Evaluating Therapist: Whit Rudd PT, DPT    [facility]  DIAGNOSIS: CVA  PRECAUTIONS: falls, L hemiparesis (UE worse than LE), sling, hx oral cancer, hx CVA in 2016 with mild chronic L hemiparesis, hx R meniscus repair in 2016, TSM, general diet  HPI: Pt is a 60year old male who developed sudden onset of left hemiparesis and was brought to [facility]. Sussy Seth on 2021. He was not felt to be a candidate for t-PA. MRI brain not able to be done due to patient's BMI. CT perfusion showed small region of ischemic penumbra in the distribution of the R HEMA. Mobile unit MRI showed findings consistent with acute infarct in the right middle cerebral artery. He was put on antiplatelet agents. Social:  Pt is in town from [facility] with wife, visiting and staying with his daughter and her family ( and 25year old son, all work). In [facility], pt lives with wife in a 2 floor plan with 1 ANITA + 3 steps without HR to main floor. Bedroom and bathroom on first floor. Laundry in basement with full flight to reach. Wife able to provide supervision as needed. Pt may return to daughter's home which is a split level with 1 ANITA + 4 steps with R HR to reach main floor where bedroom and bathroom are located. Prior to admission: Pt ambulated with R SPC, was functionally independent. Initial Evaluation  21 AM     PM    Short Term Goals Long Term Goals    Was pt agreeable to Eval/treatment? yes yes yes     Does pt have pain?  No c/o pain No c/o No c/o pain     Bed Mobility  Rolling: Cassi  Supine to sit: Cassi  Sit to supine: Cassi  Scooting: Cassi NT NT SBA Mod I   Transfers Sit to stand: Cassi  Stand to sit: Cassi  Stand pivot: Cassi with R LBQC Sit to stand CGA  Stand to sit CGA  Stand pivot CGA Sit<>Stand CGA  Stand-pivot CGA with R LBQC SBA Mod I Ambulation    75 feet with R LBQC with Cassi (WC follow) 120 feet x1 rep and 75 feet x 1 rep with R LBQC with CGA/Cassi 100 feet x 2 reps with R LBQC with CGA/Cassi  150 feet with AAD with  feet with AAD with Mod I   Walking 10 feet on uneven surface TBA NT NT 10 feet with AAD with SBA 10 feet with AAD with Mod I   Wheel Chair Mobility NA NT NT     Car Transfers Cassi with R LBQC CGA  With R LBQC NT SBA Mod I   Stair negotiation: ascended and descended  4 steps with R rail with Cassi 8 steps with 1 HR with CGA (non-reciprocal pattern)  8 steps with 1 rail with SBA 12 steps with 1 rail with Mod I   Curb Step:   ascended and descended TBA N/T NT 7.5 inch step with AAD and SBA 7.5 inch step with AAD and Mod I   Picking up object off the floor TBA NT NT Will  2lb weight with SB assist Will  4lb weight with Mod I   BLE ROM WNL WNL      BLE Strength RLE grossly 5/5  L hip flexion 3+/5  L knee extension 4/5  L ankle DF/PF 4/5 RLE grossly 5/5  L hip flexion 3+/5  L knee extension 4/5  L ankle DF/PF 4/5      Balance  Static and dynamic standing balance Cassi with R LBQC Static and dynamic standing balance CGA with R LBQC      Date Family Teach Completed        Is additional Family Teaching Needed?   Y or N Y Y      Hindering Progress Hemiparesis Hemiparesis      PT recommended ELOS 3 weeks       Team's Discharge Plan        Therapist at Team Meeting          Therapeutic Exercise:   AM: NR stepping over 3 SPC on floor with R LBQC x 2 reps with CGA  Ambulation without  feet x 1 rep with Cassi  Weaving between 3 standing quad canes with R LBQC x 2 reps with CGA/Cassi  PM: Ambulation without  feet x 1 rep with Cassi  Side stepping without AD 40 feet x 1 rep L and R with CGA  Weaving between 4 standing quad canes with R LBQC x 2 reps with CGA/Cassi  Toe taps onto 2\" step with RUE support on LBQC alternating leading LE x 8 reps BLE (Cassi/CGA Step ups onto 2\" step with RUE support on LBQC alternating leading LE x 5 reps BLE (Cassi)    Patient education  Pt educated on JIM relation to dynamic standing balance while turning    Patient response to education:   Pt verbalized understanding Pt demonstrated skill Pt requires further education in this area   yes partial yes     Additional Comments: Pt exhibits improving motor control with ambulation using LBQC. Pt requires cueing for proper sequencing and safe magnitude of cane and RLE advancement at this point of recovery. Occasional inadequate LLE advancement present however pt has adequate strength to perform correctly when adequate attention is paid to gait. L hemiparesis remains most evident barrier to recovery. Occasional obstacle collision on L present during PM session. Pt exhibits improving objective standing balance with fewer episodes LOB with dynamic standing activities. Plan to progress safety training with [facility] while continuing activity without device to promote recovery of normal gait kinematics. AM  Time in: 0830  Time out: 0915    PM  Time in: 1345  Time out: 1430    Pt is making good progress toward established Physical Therapy goals. Continue with physical therapy current plan of care.     Meryl Morris, PT, DPT  [phone]

## 2021-02-26 NOTE — PROGRESS NOTES
Progress Note  Date:2021       Room:[facility]  Patient Galen Laura     YOB: 1955     Age:65 y.o. Subjective    Subjective:  Symptoms:  Stable. He reports weakness. Diet:  Adequate intake. Activity level: Impaired due to weakness. Pain:  He reports no pain. Review of Systems   Neurological: Positive for weakness. Objective         Vitals Last 24 Hours:  TEMPERATURE:  Temp  Av.4 °F (36.9 °C)  Min: 98.1 °F (36.7 °C)  Max: 98.6 °F (37 °C)  RESPIRATIONS RANGE: Resp  Av.5  Min: 17  Max: 18  PULSE OXIMETRY RANGE: SpO2  Av.5 %  Min: 95 %  Max: 96 %  PULSE RANGE: Pulse  Av  Min: 67  Max: 67  BLOOD PRESSURE RANGE: Systolic (83XDJ), CRC:110 , Min:134 , GGE:082   ; Diastolic (48VGQ), PEJ:20, Min:74, Max:90    I/O (24Hr): Intake/Output Summary (Last 24 hours) at 2021 0737  Last data filed at 2021 4418  Gross per 24 hour   Intake 540 ml   Output 850 ml   Net -310 ml     Objective:  General Appearance: In no acute distress. Vital signs: (most recent): Blood pressure (!) 140/90, pulse 67, temperature 98.6 °F (37 °C), temperature source Temporal, resp. rate 18, height 5' 9\" (1.753 m), weight (!) 307 lb (139.3 kg), SpO2 96 %. Vital signs are normal.    Output: Producing urine and producing stool. Lungs:  Normal effort and normal respiratory rate. Breath sounds clear to auscultation. Heart: Normal rate. Regular rhythm. S1 normal and S2 normal.    Abdomen: Abdomen is soft. Bowel sounds are normal.   There is no abdominal tenderness. Extremities: Normal range of motion. Neurological: Patient is alert. (4/5 strength  Apraxic).       Labs/Imaging/Diagnostics    Labs:  CBC:  Recent Labs     21  1106   WBC 11.3   RBC 4.56   HGB 13.1   HCT 40.6   MCV 89.0   RDW 13.8        CHEMISTRIES:  Recent Labs     21  1106 21  0539    141   K 3.8 3.2*    105   CO2 24 28   BUN 12 17   CREATININE 1.0 1.2 GLUCOSE 99 100*     PT/INR:No results for input(s): PROTIME, INR in the last 72 hours. APTT:No results for input(s): APTT in the last 72 hours. LIVER PROFILE:No results for input(s): AST, ALT, BILIDIR, BILITOT, ALKPHOS in the last 72 hours. Imaging Last 24 Hours:  No results found. Assessment//Plan           Hospital Problems           Last Modified POA    Acute CVA (cerebrovascular accident) ([facility] [address] 2/23/2021 Yes        Initial Evaluation  2/24/21 AM     PM    Short Term Goals Long Term Goals     Was pt agreeable to Eval/treatment? yes yes yes       Does pt have pain?  No c/o pain No c/o No c/o pain       Bed Mobility  Rolling: Cassi  Supine to sit: Cassi  Sit to supine: Cassi  Scooting: Cassi   Supine to sit SBA  Sit to supine SBA  Scooting SBA SBA Mod I   Transfers Sit to stand: Cassi  Stand to sit: Cassi  Stand pivot: Cassi with R LBQC Sit to stand CGA  Stand to sit Cassi  Stand pivot Cassi Sit<>Stand CGA  Stand-pivot Cassi with R LBQC SBA Mod I   Ambulation    75 feet with R LBQC with Cassi (WC follow) 75 feet x2 reps with LBQC Cassi see comments 75 feet x 1 reps with R LBQC with Cassi  150 feet with AAD with  feet with AAD with Mod I   Walking 10 feet on uneven surface TBA   NT 10 feet with AAD with SBA 10 feet with AAD with Mod I   Wheel Chair Mobility NA   NT       Car Transfers Cassi with R LBQC Cassi NT SBA Mod I   Stair negotiation: ascended and descended  4 steps with R rail with Cassi 4 steps 1 rail Cassi   8 steps with 1 rail with SBA 12 steps with 1 rail with Mod I   Curb Step:   ascended and descended TBA N/T NT 7.5 inch step with AAD and SBA 7.5 inch step with AAD and Mod I   Picking up object off the floor TBA   NT Will  2lb weight with SB assist Will  4lb weight with Mod I   BLE ROM WNL           BLE Strength RLE grossly 5/5  L hip flexion 3+/5  L knee extension 4/5  L ankle DF/PF 4/5           Balance  Static and dynamic standing balance Cassi with R LBQC         Date Family Teach Completed             Is additional Family Teaching Needed? Y or N Y           Hindering Progress Hemiparesis            Assessment:    Condition: In stable condition. Improving.   (CVA). Plan:   Encourage ambulation. (Tolerating therapy well  Working on balance and coordination  Potassium is low  He is on a potassium supplement and Aldactone  I will increase the supplement and recheck).        Electronically signed by Radha Weller MD on 2/26/21 at 7:37 AM EST

## 2021-02-27 PROCEDURE — 94660 CPAP INITIATION&MGMT: CPT

## 2021-02-27 PROCEDURE — 6370000000 HC RX 637 (ALT 250 FOR IP): Performed by: PHYSICAL MEDICINE & REHABILITATION

## 2021-02-27 PROCEDURE — 99233 SBSQ HOSP IP/OBS HIGH 50: CPT | Performed by: PHYSICAL MEDICINE & REHABILITATION

## 2021-02-27 PROCEDURE — 51798 US URINE CAPACITY MEASURE: CPT

## 2021-02-27 PROCEDURE — 97130 THER IVNTJ EA ADDL 15 MIN: CPT

## 2021-02-27 PROCEDURE — 6370000000 HC RX 637 (ALT 250 FOR IP): Performed by: INTERNAL MEDICINE

## 2021-02-27 PROCEDURE — 6360000002 HC RX W HCPCS: Performed by: INTERNAL MEDICINE

## 2021-02-27 PROCEDURE — 1280000000 HC REHAB R&B

## 2021-02-27 PROCEDURE — 97129 THER IVNTJ 1ST 15 MIN: CPT

## 2021-02-27 PROCEDURE — 97530 THERAPEUTIC ACTIVITIES: CPT

## 2021-02-27 PROCEDURE — 97535 SELF CARE MNGMENT TRAINING: CPT

## 2021-02-27 RX ADMIN — LOSARTAN POTASSIUM 50 MG: 50 TABLET, FILM COATED ORAL at 07:51

## 2021-02-27 RX ADMIN — MESALAMINE 800 MG: 400 CAPSULE, DELAYED RELEASE ORAL at 21:18

## 2021-02-27 RX ADMIN — MESALAMINE 800 MG: 400 CAPSULE, DELAYED RELEASE ORAL at 14:30

## 2021-02-27 RX ADMIN — ASPIRIN 81 MG: 81 TABLET, CHEWABLE ORAL at 07:58

## 2021-02-27 RX ADMIN — ENOXAPARIN SODIUM 40 MG: 40 INJECTION SUBCUTANEOUS at 07:52

## 2021-02-27 RX ADMIN — CLOPIDOGREL 75 MG: 75 TABLET, FILM COATED ORAL at 07:51

## 2021-02-27 RX ADMIN — ATORVASTATIN CALCIUM 40 MG: 40 TABLET, FILM COATED ORAL at 21:18

## 2021-02-27 RX ADMIN — MESALAMINE 800 MG: 400 CAPSULE, DELAYED RELEASE ORAL at 07:54

## 2021-02-27 RX ADMIN — AMLODIPINE BESYLATE 5 MG: 5 TABLET ORAL at 07:54

## 2021-02-27 RX ADMIN — POTASSIUM CHLORIDE 30 MEQ: 1500 TABLET, EXTENDED RELEASE ORAL at 07:58

## 2021-02-27 RX ADMIN — SERTRALINE 50 MG: 50 TABLET, FILM COATED ORAL at 07:51

## 2021-02-27 RX ADMIN — METOPROLOL SUCCINATE 25 MG: 25 TABLET, EXTENDED RELEASE ORAL at 07:52

## 2021-02-27 RX ADMIN — SPIRONOLACTONE 50 MG: 25 TABLET ORAL at 07:51

## 2021-02-27 RX ADMIN — LEVOTHYROXINE SODIUM 75 MCG: 0.07 TABLET ORAL at 06:52

## 2021-02-27 RX ADMIN — POTASSIUM CHLORIDE 30 MEQ: 1500 TABLET, EXTENDED RELEASE ORAL at 16:28

## 2021-02-27 RX ADMIN — PREDNISONE 20 MG: 20 TABLET ORAL at 07:52

## 2021-02-27 ASSESSMENT — PAIN SCALES - GENERAL: PAINLEVEL_OUTOF10: 0

## 2021-02-27 NOTE — PROGRESS NOTES
Physical Therapy    Facility/Department: [address] REHAB  Daily Treatment Note    NAME: Leslee Banks  : 1955  MRN: [id number]    Date of Service: 2021    Evaluating Therapist: Catracho Mcgee PT, DPT    ROOM: [facility]  DIAGNOSIS: CVA  PRECAUTIONS: falls, L hemiparesis (UE worse than LE), sling, hx oral cancer, hx CVA in 2016 with mild chronic L hemiparesis, hx R meniscus repair in 2016, TSM, general diet  HPI: Pt is a 60year old male who developed sudden onset of left hemiparesis and was brought to [address] [place] [address]. He was not felt to be a candidate for t-PA. MRI brain not able to be done due to patient's BMI. CT perfusion showed small region of ischemic penumbra in the distribution of the R HEMA. Mobile unit MRI showed findings consistent with acute infarct in the right middle cerebral artery. He was put on antiplatelet agents. Social:  Pt is in town from [facility] with wife, visiting and staying with his daughter and her family ( and 25year old son, all work). In [facility], pt lives with wife in a 2 floor plan with 1 ANITA + 3 steps without HR to main floor. Bedroom and bathroom on first floor. Laundry in basement with full flight to reach. Wife able to provide supervision as needed. Pt may return to daughter's home which is a split level with 1 ANITA + 4 steps with R HR to reach main floor where bedroom and bathroom are located. Prior to admission: Pt ambulated with R SPC, was functionally independent. Initial Evaluation  21 AM     PM    Short Term Goals Long Term Goals    Was pt agreeable to Eval/treatment? yes yes      Does pt have pain?  No c/o pain No c/o      Bed Mobility  Rolling: Cassi  Supine to sit: Cassi  Sit to supine: Cassi  Scooting: Cassi NT  SBA Mod I   Transfers Sit to stand: Cassi  Stand to sit: Cassi  Stand pivot: Cassi with R LBQC Sit to stand CGA  Stand to sit CGA  Stand pivot CGA  SBA Mod I Ambulation    75 feet with R LBQC with Cassi (WC follow) 125 feet x1 rep and 75 feet x 1 rep with R LBQC with CGA/Cassi   150 feet with AAD with  feet with AAD with Mod I   Walking 10 feet on uneven surface TBA NT  10 feet with AAD with SBA 10 feet with AAD with Mod I   Wheel Chair Mobility NA NT      Car Transfers Cassi with R LBQC CGA  With R LBQC  SBA Mod I   Stair negotiation: ascended and descended  4 steps with R rail with Cassi 8 steps with 1 HR with CGA (non-reciprocal pattern)  8 steps with 1 rail with SBA 12 steps with 1 rail with Mod I   Curb Step:   ascended and descended TBA N/T  7.5 inch step with AAD and SBA 7.5 inch step with AAD and Mod I   Picking up object off the floor TBA NT  Will  2lb weight with SB assist Will  4lb weight with Mod I   BLE ROM WNL WNL      BLE Strength RLE grossly 5/5  L hip flexion 3+/5  L knee extension 4/5  L ankle DF/PF 4/5 RLE grossly 5/5  L hip flexion 3+/5  L knee extension 4/5  L ankle DF/PF 4/5      Balance  Static and dynamic standing balance Cassi with R LBQC Static and dynamic standing balance CGA with R LBQC      Date Family Teach Completed        Is additional Family Teaching Needed? Y or N Y Y      Hindering Progress Hemiparesis Hemiparesis      PT recommended ELOS 3 weeks       Team's Discharge Plan        Therapist at Team Meeting          Therapeutic Exercise:   AM: step up on step with L LE 2x10, toe taps on 6 inch step 2 rails 2x10. PM:   Patient education  Pt educated  Increase clearance of L LE.     Patient response to education:   Pt verbalized understanding Pt demonstrated skill Pt requires further education in this area   yes partial yes Additional Comments: Pt seen for P.T this a.m. Sling donned. Pt amb armaan LBQC CG/Cassi. Occ L foot catches/decreased clearance but able to correct. Pt had slight LOB descending steps requiring Cassi to correct. Pt returned to room at end of tx session and sling doffed. Tray table placed to support UE. AM  Time in:9:00  Time out: 0930        Pt is making good progress toward established Physical Therapy goals. Continue with physical therapy current plan of care.     Heike Balderas [id number]

## 2021-02-27 NOTE — PROGRESS NOTES
OCCUPATIONAL THERAPY DAILY NOTE    Date:2021  Patient Name: Vinita Duarte  MRN: [id number]  : 1955  Room: [address] [place] [address]   Referring Practitioner: Karen Richards MD  Diagnosis: CVA- RMCA  Additional Pertinent Hx: HLD, HTN, obese & hypothyroidism    Precautions: Fall Risk, L hemiparesis    Functional Assessment:   Date Status AE  Comments   Feeding 21 SBA     Grooming 21 Mod A  To comb hair, wash face and apply deodorant seated at sink. Pt required assist to apply deodorant under R underarm. Oral Care 21 Min A  Completed seated at sink. Pt able to utilize adaptive techniques to open packages with min cues. Bathing 21 UB: Min A  LB:Maximal Assist   Pt agreeable to UB bathing only this date d/t already being fully dressed by staff this AM. Pt required assist to wash R upper arm and armpit. Min cues for adaptive techniques provided with fair+ follow through. UB Dressing 21 Mod A  To don/doff pull over shirt seated in chair. Min cues for lg techniques provided with fair follow through. Pt required assist to initiate threading L UE into sleeve and to manage clothing down around trunk in back. LB Dressing 21 Dependent   21:Pt deferred this date d/t staff completing dressing this AM prior to scheduled ADL session. Footwear 21 Dependent   21:Pt deferred this date d/t staff completing dressing this AM prior to scheduled ADL session.    Toileting 21 Dependent      Homemaking         Functional Transfers / Balance:   Date Status DME  Comments   Sit Balance 21 Stand by Assist      Stand Balance 21 Minimal Assist      [] Tub  [] Shower   Transfer       Commode   Transfer 21 Min A  SPT from w/c   Functional   Mobility 21 Moderate Assist      Other:         Functional Exercises / Activity:      Sensory / Neuromuscular Re-Education:      Cognitive Skills:   Status Comments   Problem   Solving fair     Memory fair     Sequencing fair Safety fair       Visual Perception:    Education:  -Pt educated on lg dressing techniques for increased independence with ADL tasks. Pt verbalized/demonstrated fair understanding, recommend continued education. [] Family teach completed on:    Pain Level: 0/10    Additional Notes:       Patient has made good  progress during treatment sessions toward set goals. Therapy emphasis to obtain goals:Strengthening, Endurance Training, Neuromuscular Re-education, Patient/Caregiver Education & Training, ROM, Cognitive Reorientation, Equipment Evaluation, Education, & procurement, Self-Care / ADL, Balance Training, Gait Training, Home Management Training, Functional Mobility Training, Safety Education & Training, Positioning     [x] Continue with current OT Plan of care.   [] Prepare for Discharge     DISCHARGE RECOMMENDATIONS  Recommended DME:    Post Discharge Care:   []Home Independently  []Home with 24hr Care / Supervision []Home with Partial Supervision []Home with Home Health OT []Home with Out Pt OT []Other: ___   Comments:         Time in Time out Tx Time Breakdown  Variance:   First Session  0800 0830 [x] Individual Tx-30 Mins  [] Concurrent Tx -  [] Co-Tx -   [] Group Tx -   [] Time Missed -     Second Session   [] Individual Tx-   [] Concurrent Tx -  [] Co-Tx -   [] Group Tx -   [] Time Missed -     Third Session    [] Individual Tx-   [] Concurrent Tx -  [] Co-Tx -   [] Group Tx -   [] Time Missed -         Total Tx Time: 30 Mins        Ebony Hayward/L [address] [facility], [address] [facility], OTR/L [id number]

## 2021-02-27 NOTE — PROGRESS NOTES
the patient and family in examination, chart and imaging review and treatment of the patient's disorder    Emeka Groves DO, [facility]  Physical Medicine and Rehabilitation     Please note that the above documentation was prepared using voice recognition software. Every attempt was made to ensure accuracy but there may be spelling, grammatical, and contextual errors.

## 2021-02-27 NOTE — PROGRESS NOTES
Speech Language Pathology  ACUTE REHABILITATION--DAILY PROGRESS NOTE          PATIENT NAME:  Sherren Philips      :  1955         TODAY'S DATE:  2021       ROOM:  [address] [place] [address]      ADMITTING DIAGNOSIS: Acute CVA (cerebrovascular accident) ([facility] [address] [I63.9]     SPEECH PATHOLOGY DIAGNOSIS:    Mild cognitive-linguistic deficits     THERAPY RECOMMENDATIONS:   Speech Pathology intervention is recommended 3-6 times per week for LOS or when goals are met with emphasis on the following:      Immediate/ STM for functional information to complete tasks as instructed and recall pertinent medical information with minimal and/or use of external memory aide training.   Problem solving/ insight/ judgement for various ADL/iADL tasks, including but not limited to: medication management and money/ finance management, overall safety awareness in the PLOF with 80% accuracy                                                                                                                                        FIMS SCORES                            Swallowing                          Current Status             7--Independent                         Short Term Goal         7--Independent                         Long Term Goal          7--Independent                Receptive                          Current Status             7--Independent                         Short Term Goal         7--Independent                         Long Term Goal          7--Independent                Expressive                          Current Status             7--Independent                         Short Term Goal         7--Independent                         Long Term Goal          7--Independent                Social Interaction                          Current Status             7--Independent                         Short Term Goal         7--Independent                         Long Term Goal          7--Independent

## 2021-02-27 NOTE — PROGRESS NOTES
Progress Note  2/27/2021 1:08 PM  Subjective:   Admit Date: 2/23/2021  PCP: No primary care provider on file. Interval History: Patient examined doing well on a wheel chair     Diet: DIET GENERAL;    Data:   Scheduled Meds:   potassium chloride  30 mEq Oral BID WC    enoxaparin  40 mg Subcutaneous Daily    amLODIPine  5 mg Oral Daily    aspirin  81 mg Oral Daily    atorvastatin  40 mg Oral Nightly    clopidogrel  75 mg Oral Daily    levothyroxine  75 mcg Oral Daily    losartan  50 mg Oral Daily    mesalamine  800 mg Oral TID    metoprolol succinate  25 mg Oral Daily    sertraline  50 mg Oral Daily    spironolactone  50 mg Oral Daily     Continuous Infusions:  PRN Meds:acetaminophen **OR** acetaminophen, promethazine **OR** ondansetron, acetaminophen, polyethylene glycol  I/O last 3 completed shifts: In: 840 [P.O.:840]  Out: 1500 [Urine:1500]  I/O this shift: In: 480 [P.O.:480]  Out: -     Intake/Output Summary (Last 24 hours) at 2/27/2021 1308  Last data filed at 2/27/2021 1159  Gross per 24 hour   Intake 1080 ml   Output 1250 ml   Net -170 ml     CBC: No results for input(s): WBC, HGB, PLT in the last 72 hours. BMP:    Recent Labs     02/26/21  0539      K 3.2*      CO2 28   BUN 17   CREATININE 1.2   GLUCOSE 100*     Hepatic: No results for input(s): AST, ALT, ALB, BILITOT, ALKPHOS in the last 72 hours. Troponin: No results for input(s): TROPONINI in the last 72 hours. BNP: No results for input(s): BNP in the last 72 hours. Lipids: No results for input(s): CHOL, HDL in the last 72 hours. Invalid input(s): LDLCALCU  ABGs: No results found for: PHART, PO2ART, UEM5OBH  INR: No results for input(s): INR in the last 72 hours.     -----------------------------------------------------------------  RAD: Echo Complete    Result Date: 2/23/2021  Transthoracic Echocardiography Report (TTE)  Demographics   Patient Name         Roby Davis   Gender                Male   Medical Record [id number]    Room Number           9119  Number   Account #            [de-identified]   Procedure Date        02/23/2021   Corporate ID                     Ordering Physician    Jian Coronado   Accession Number     [id number]  Referring Physician   Date of Birth        1955  Sonographer           Sushila POPE   Age                  60 year(s)  Interpreting          Jacoby Olivera MD                                   Physician                                    Any Other  Procedure Type of Study   TTE procedure:Echo W/Bubble Study. Procedure Date Date: 02/23/2021 Start: 11:50 AM Study Location: Portable Technical Quality: Limited visualization due to body habitus. Indications:CVA. Patient Status: Pending Discharge Height: 69 inches Weight: 320 pounds BSA: 2.52 m^2 BMI: 47.26 kg/m^2 Rhythm: Within normal limits HR: 81 bpm BP: 164/93 mmHg  Findings   Left Ventricle  Left ventricle size is normal.  Concentric left ventricular hypertrophy. Normal left ventricular systolic function. Ejection fraction is visually estimated at 60%. There is doppler evidence of stage I diastolic dysfunction. Right Ventricle  Normal right ventricular size and function (TAPSE 2.5 cm). Left Atrium  Normal sized left atrium by volume index. No apparent interatrial shunting on bubble study (suboptimal  visualization). Right Atrium  Normal sized right atrium. Mitral Valve  Physiologic and/or trace mitral regurgitation. No evidence of hemodynamically significant mitral stenosis. Tricuspid Valve  Physiologic and/or trace tricuspid regurgitation. Aortic Valve  No evidence of hemodynamically significant aortic regurgitation or  stenosis. Pulmonic Valve  The pulmonic valve was not well visualized. Pericardial Effusion  No evidence of a hemodynamically significant pericardial effusion. Aorta  Aortic root dimension within normal limits. Conclusions   Summary  Normal left ventricular systolic function.   Ejection fraction is visually estimated at 60%. Normal right ventricular size and function (TAPSE 2.5 cm). There is doppler evidence of stage I diastolic dysfunction. No apparent interatrial shunting on bubble study (suboptimal  visualization). Signature   ----------------------------------------------------------------  Electronically signed by Kaylie Walker MD(Interpreting  physician) on 02/23/2021 01:41 PM  ----------------------------------------------------------------  M-Mode/2D Measurements & Calculations   LV Diastolic     LV Systolic Dimension: 3 cm     AV Cusp Separation: 2.1  Dimension: 4.8   LV Volume Diastolic: 255.6 ml   cmAO Root Dimension: 3.5  cm               LV Volume Systolic: 38.6 ml     cm  LV FS:37.5 %     LV EDV/LV EDV Index: 465.3  LV PW Diastolic: WN/98 HW/R^1VZ ESV/LV ESV  1.1 cm           Index: 35.2 ml/14ml/ m^2  LV PW Systolic:  EF Calculated: 67 %             RV Diastolic Dimension:  1.6 cm           LV Mass Index: 98 l/min*m^2     2.7 cm  Septum           LV Length: 9 cm  Diastolic: 1.5                                   Ascending Aorta: 3.6 cm  cm               LVOT: 2.5 cm                    LA volume/Index: 52.1 ml  Septum Systolic:                                 /20.68ml/m^2  1. 6 cm                                           RA Area: 11.9 cm^2  CO: 5.96 l/min  CI: 2.37 l/m*m^2  LV Mass: 245.73  g  Doppler Measurements & Calculations   MV Peak E-Wave:    AV Peak Velocity: 1.35 m/s      LVOT Peak Velocity:  0.61 m/s           AV Peak Gradient: 7.3 mmHg      0.78 m/s  MV Peak A-Wave:    AV Mean Velocity: 1.02 m/s      LVOT Mean Velocity:  1.09 m/s           AV Mean Gradient: 4.5 mmHg      0.54 m/s  MV E/A Ratio: 0.56 AV VTI: 25.6 cm                 LVOT Peak Gradient: 2.4  MV Peak Gradient:  AV Area (Continuity):2.87 cm^2  mmHgLVOT Mean Gradient:  4.4 mmHg                                           1.3 mmHg  MV Mean Gradient:  LVOT VTI: 15 cm  1.9 mmHg  MV Mean Velocity:  0.63 m/s           Pulm.  Vein

## 2021-02-28 LAB
ANION GAP SERPL CALCULATED.3IONS-SCNC: 8 MMOL/L (ref 7–16)
BUN BLDV-MCNC: 19 MG/DL (ref 8–23)
CALCIUM SERPL-MCNC: 8.4 MG/DL (ref 8.6–10.2)
CHLORIDE BLD-SCNC: 103 MMOL/L (ref 98–107)
CO2: 28 MMOL/L (ref 22–29)
CREAT SERPL-MCNC: 1.1 MG/DL (ref 0.7–1.2)
GFR AFRICAN AMERICAN: >60
GFR NON-AFRICAN AMERICAN: >60 ML/MIN/1.73
GLUCOSE BLD-MCNC: 97 MG/DL (ref 74–99)
POTASSIUM SERPL-SCNC: 3.2 MMOL/L (ref 3.5–5)
SODIUM BLD-SCNC: 139 MMOL/L (ref 132–146)

## 2021-02-28 PROCEDURE — 36415 COLL VENOUS BLD VENIPUNCTURE: CPT

## 2021-02-28 PROCEDURE — 1280000000 HC REHAB R&B

## 2021-02-28 PROCEDURE — 6370000000 HC RX 637 (ALT 250 FOR IP): Performed by: INTERNAL MEDICINE

## 2021-02-28 PROCEDURE — 80048 BASIC METABOLIC PNL TOTAL CA: CPT

## 2021-02-28 PROCEDURE — 6360000002 HC RX W HCPCS: Performed by: INTERNAL MEDICINE

## 2021-02-28 PROCEDURE — 97535 SELF CARE MNGMENT TRAINING: CPT

## 2021-02-28 PROCEDURE — 6370000000 HC RX 637 (ALT 250 FOR IP): Performed by: PHYSICAL MEDICINE & REHABILITATION

## 2021-02-28 PROCEDURE — 97110 THERAPEUTIC EXERCISES: CPT

## 2021-02-28 PROCEDURE — 94660 CPAP INITIATION&MGMT: CPT

## 2021-02-28 RX ORDER — POTASSIUM CHLORIDE 20 MEQ/1
40 TABLET, EXTENDED RELEASE ORAL
Status: DISCONTINUED | OUTPATIENT
Start: 2021-02-28 | End: 2021-03-03

## 2021-02-28 RX ADMIN — MESALAMINE 800 MG: 400 CAPSULE, DELAYED RELEASE ORAL at 12:40

## 2021-02-28 RX ADMIN — LEVOTHYROXINE SODIUM 75 MCG: 0.07 TABLET ORAL at 06:25

## 2021-02-28 RX ADMIN — MESALAMINE 800 MG: 400 CAPSULE, DELAYED RELEASE ORAL at 08:07

## 2021-02-28 RX ADMIN — POTASSIUM CHLORIDE 40 MEQ: 1500 TABLET, EXTENDED RELEASE ORAL at 16:44

## 2021-02-28 RX ADMIN — SPIRONOLACTONE 50 MG: 25 TABLET ORAL at 08:08

## 2021-02-28 RX ADMIN — SERTRALINE 50 MG: 50 TABLET, FILM COATED ORAL at 08:06

## 2021-02-28 RX ADMIN — ASPIRIN 81 MG: 81 TABLET, CHEWABLE ORAL at 08:09

## 2021-02-28 RX ADMIN — LOSARTAN POTASSIUM 50 MG: 50 TABLET, FILM COATED ORAL at 08:07

## 2021-02-28 RX ADMIN — ENOXAPARIN SODIUM 40 MG: 40 INJECTION SUBCUTANEOUS at 08:08

## 2021-02-28 RX ADMIN — METOPROLOL SUCCINATE 25 MG: 25 TABLET, EXTENDED RELEASE ORAL at 08:09

## 2021-02-28 RX ADMIN — MESALAMINE 800 MG: 400 CAPSULE, DELAYED RELEASE ORAL at 21:15

## 2021-02-28 RX ADMIN — ATORVASTATIN CALCIUM 40 MG: 40 TABLET, FILM COATED ORAL at 21:15

## 2021-02-28 RX ADMIN — POTASSIUM CHLORIDE 30 MEQ: 1500 TABLET, EXTENDED RELEASE ORAL at 08:09

## 2021-02-28 RX ADMIN — POTASSIUM CHLORIDE 40 MEQ: 1500 TABLET, EXTENDED RELEASE ORAL at 12:40

## 2021-02-28 RX ADMIN — CLOPIDOGREL 75 MG: 75 TABLET, FILM COATED ORAL at 08:08

## 2021-02-28 RX ADMIN — AMLODIPINE BESYLATE 5 MG: 5 TABLET ORAL at 08:06

## 2021-02-28 NOTE — PROGRESS NOTES
Therapeutic Recreation Assessment     LEISURE INTEREST SURVEY               Key: P = Past Interest C = Current Interest F = Future Interest     Arts/Crafts:  ___Mechanical  ___Woodworking    ___ Painting   ___Floral arranging ___ Ceramics         _ __ Knitting                                                     ___ Crocheting        ___Other: ___________      Cooking:  _ _Baking ___Cooking    ___   Other: _______   Comments:       Games:  ___Cards  ___Darts  _P__Board games    ___Word games  ___Crossword puzzles    ___Seek-n-find/jumble                   _C__Other: Phone Games_________      Horticulture:  ___Vegetables  ___Flowers  ___House plants                                                     ___Other: ___________      House/Yard Care:  ___Ironing  ___Laundry  ___Cleaning                                                      ___Repairs              _P__Lawn care                                                     ___Other: ___________      Music Listening/Playing:  ___Classical       ___Big Band       Y___[id number]                                                     ___Country          ___R&B             ___Gospel/Hymns                                                     ___Other: ___________      Outdoor Activities:  ___Hunting          ___Fishing          ___Camping                                                     ___Other: ___________      Pets/Animals:  _P__Dogs             _P__Cats                ___Fish                                                     ___Birds             ___Livestock         ___Other: _________    Reading:   ___Newspapers  ___Magazines ___Other:stories                                                     ___Other: ___________      Adventist Activities:  Rastafari: ___________   Lyndsay Reilly Activities: ___________      Shopping:   ___Grocery  ___Clothing  ___Flea market     _C__Other: __Electronics_________      Socialization: C___Family  _P__Friends  ___Neighbors ___Church  ___Volunteer ___Club/Organization                                                     ___Other: ___________    Sports/Exercises:  _C__Walking  _P__Football  _P__Basketball     ___Golf  ___Baseball  ___Tennis       ___Bowling  _P__Other: __Softball_________      Traveling:   ___Global  _C__Stateside  ___Local       ___Other: ___________      TV/Radio:   _C__Mysteries  ___Comedies ___Romance                                                     ___Game show       ___Sports       ___News                                                      ___Talk show          _C__Other: _Movies__________      OtherRosa Elena Yuanjonathan identified feelings of being disappointed . 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 [x]Yes  []No  Comments: _________    How often do you participate in your leisure interest? Everyday   Resources Name a restaurant, store or business you frequent? [employer]   Personal When are you most happy?  Traveling with my wife     Barriers to Leisure Participation:  []Visual   [][place] [employer]  []Cognition/Communication  []Motivation  []Financial  []Transportation  []Time Constraints  [x]Physical Ability  []Leisure Awareness  []Drug/Alcohol  []Other: ___________ Recommendations:  []Group Session  [x]Individual Session  []Client Refused Services  []No Therapy  []Other: ___________    Objectives:  [x]Establish adaptations for leisure involvement   [x]Increase Self worth/self esteem  []Community reintegration training   [x]Social interaction  [x]Leisure participation  []Other: ___________    Goals for Therapeutic Recreation Services:   Social Interaction:   Admission Functional [place] Measure: ____5_______  Discharge Functional [place] Measure: _____6_____   Other:________________________________      Leisure Choice/ Increase Mellisa Quality of Life: To participate in 1 premorbid leisure activities of choice by discharge to increase leisure choice and independence thus increasing the overall quality of his/her life. Socialization/Social Interaction: To increase social interaction skills by introducing self 1 x per week at the beginning of TR session . Adaptations: To increase knowledge of adaptations to leisure involvement by participating in 1 modified leisure activities by dc. Leisure Activity Tolerance: To increase leisure tolerance by attending 1 leisure activities per week for 20 minutes with active participation. Self Expression: To participate in 1 leisure activity(s) of choice, identifying 1 benefits to leisure participation. Self esteem/confidence: To complete 1 leisure activities with success 1x   by discharge. Kavon Beltran Lily

## 2021-02-28 NOTE — PROGRESS NOTES
Progress Note  2/28/2021 11:23 AM  Subjective:   Admit Date: 2/23/2021  PCP: No primary care provider on file. Interval History: Patient examined doing well feels ok     Diet: DIET GENERAL;    Data:   Scheduled Meds:   potassium chloride  30 mEq Oral TID WC    enoxaparin  40 mg Subcutaneous Daily    amLODIPine  5 mg Oral Daily    aspirin  81 mg Oral Daily    atorvastatin  40 mg Oral Nightly    clopidogrel  75 mg Oral Daily    levothyroxine  75 mcg Oral Daily    losartan  50 mg Oral Daily    mesalamine  800 mg Oral TID    metoprolol succinate  25 mg Oral Daily    sertraline  50 mg Oral Daily    spironolactone  50 mg Oral Daily     Continuous Infusions:  PRN Meds:acetaminophen **OR** acetaminophen, promethazine **OR** ondansetron, acetaminophen, polyethylene glycol  I/O last 3 completed shifts: In: 480 [P.O.:480]  Out: 1150 [Urine:1150]  I/O this shift:  In: 240 [P.O.:240]  Out: -     Intake/Output Summary (Last 24 hours) at 2/28/2021 1123  Last data filed at 2/28/2021 0806  Gross per 24 hour   Intake 480 ml   Output 1150 ml   Net -670 ml     CBC: No results for input(s): WBC, HGB, PLT in the last 72 hours. BMP:    Recent Labs     02/26/21  0539 02/28/21  0447    139   K 3.2* 3.2*    103   CO2 28 28   BUN 17 19   CREATININE 1.2 1.1   GLUCOSE 100* 97     Hepatic: No results for input(s): AST, ALT, ALB, BILITOT, ALKPHOS in the last 72 hours. Troponin: No results for input(s): TROPONINI in the last 72 hours. BNP: No results for input(s): BNP in the last 72 hours. Lipids: No results for input(s): CHOL, HDL in the last 72 hours. Invalid input(s): LDLCALCU  ABGs: No results found for: PHART, PO2ART, XAA6FWH  INR: No results for input(s): INR in the last 72 hours.     -----------------------------------------------------------------  RAD: Echo Complete    Result Date: 2/23/2021  Transthoracic Echocardiography Report (TTE)  Demographics   Patient Name         Caity Blow   Gender Male   Medical Record       [id number]    Room Number           8629  Number   Account #            [de-identified]   Procedure Date        02/23/2021   Corporate ID                     Ordering Physician    Maegan Matos   Accession Number     [id number]  Referring Physician   Date of Birth        1955  Sonographer           Louie POPE   Age                  60 year(s)  Interpreting          Marcus Pascual MD                                   Physician                                    Any Other  Procedure Type of Study   TTE procedure:Echo W/Bubble Study. Procedure Date Date: 02/23/2021 Start: 11:50 AM Study Location: Portable Technical Quality: Limited visualization due to body habitus. Indications:CVA. Patient Status: Pending Discharge Height: 69 inches Weight: 320 pounds BSA: 2.52 m^2 BMI: 47.26 kg/m^2 Rhythm: Within normal limits HR: 81 bpm BP: 164/93 mmHg  Findings   Left Ventricle  Left ventricle size is normal.  Concentric left ventricular hypertrophy. Normal left ventricular systolic function. Ejection fraction is visually estimated at 60%. There is doppler evidence of stage I diastolic dysfunction. Right Ventricle  Normal right ventricular size and function (TAPSE 2.5 cm). Left Atrium  Normal sized left atrium by volume index. No apparent interatrial shunting on bubble study (suboptimal  visualization). Right Atrium  Normal sized right atrium. Mitral Valve  Physiologic and/or trace mitral regurgitation. No evidence of hemodynamically significant mitral stenosis. Tricuspid Valve  Physiologic and/or trace tricuspid regurgitation. Aortic Valve  No evidence of hemodynamically significant aortic regurgitation or  stenosis. Pulmonic Valve  The pulmonic valve was not well visualized. Pericardial Effusion  No evidence of a hemodynamically significant pericardial effusion. Aorta  Aortic root dimension within normal limits.    Conclusions   Summary  Normal left ventricular systolic function. Ejection fraction is visually estimated at 60%. Normal right ventricular size and function (TAPSE 2.5 cm). There is doppler evidence of stage I diastolic dysfunction. No apparent interatrial shunting on bubble study (suboptimal  visualization). Signature   ----------------------------------------------------------------  Electronically signed by Bria Anthony MD(Interpreting  physician) on 02/23/2021 01:41 PM  ----------------------------------------------------------------  M-Mode/2D Measurements & Calculations   LV Diastolic     LV Systolic Dimension: 3 cm     AV Cusp Separation: 2.1  Dimension: 4.8   LV Volume Diastolic: 627.8 ml   cmAO Root Dimension: 3.5  cm               LV Volume Systolic: 24.5 ml     cm  LV FS:37.5 %     LV EDV/LV EDV Index: 032.6  LV PW Diastolic: VM/58 XY/D^8CI ESV/LV ESV  1.1 cm           Index: 35.2 ml/14ml/ m^2  LV PW Systolic:  EF Calculated: 67 %             RV Diastolic Dimension:  1.6 cm           LV Mass Index: 98 l/min*m^2     2.7 cm  Septum           LV Length: 9 cm  Diastolic: 1.5                                   Ascending Aorta: 3.6 cm  cm               LVOT: 2.5 cm                    LA volume/Index: 52.1 ml  Septum Systolic:                                 /20.68ml/m^2  1. 6 cm                                           RA Area: 11.9 cm^2  CO: 5.96 l/min  CI: 2.37 l/m*m^2  LV Mass: 245.73  g  Doppler Measurements & Calculations   MV Peak E-Wave:    AV Peak Velocity: 1.35 m/s      LVOT Peak Velocity:  0.61 m/s           AV Peak Gradient: 7.3 mmHg      0.78 m/s  MV Peak A-Wave:    AV Mean Velocity: 1.02 m/s      LVOT Mean Velocity:  1.09 m/s           AV Mean Gradient: 4.5 mmHg      0.54 m/s  MV E/A Ratio: 0.56 AV VTI: 25.6 cm                 LVOT Peak Gradient: 2.4  MV Peak Gradient:  AV Area (Continuity):2.87 cm^2  mmHgLVOT Mean Gradient:  4.4 mmHg                                           1.3 mmHg  MV Mean Gradient:  LVOT VTI: 15 cm  1.9 mmHg  MV Mean Velocity: 0.63 m/s           Pulm. Vein A Reversal  MV Deceleration    Duration:163.8 msec  Time: 295.3 msec   Pulm. Vein D Velocity:0.53  MV P1/2t: 79.9     m/sPulm. Vein A Reversal  msec               Velocity:0.38 m/s  MVA by PHT:2.75    Pulm. Vein S Velocity: 0.83 m/s  cm^2  MV Area  (continuity): 3.6  cm^2  MV E' Septal  Velocity: 0.08 m/s  MV E' Lateral  Velocity: 7 m/s  [email or web address] [id number]      Objective:   Vitals: BP (!) 162/89   Pulse 71   Temp 97.6 °F (36.4 °C) (Oral)   Resp 18   Ht 5' 9\" (1.753 m)   Wt (!) 305 lb (138.3 kg)   SpO2 96%   BMI 45.04 kg/m²   General appearance: appears stated age   Skin:  No rashes or lesions  HEENT: Head: Normocephalic, no lesions, without obvious abnormality. Neck: no adenopathy, no carotid bruit, no JVD, supple, symmetrical, trachea midline and thyroid not enlarged, symmetric, no tenderness/mass/nodules  Lungs: clear to auscultation bilaterally  Heart: regular rate and rhythm, S1, S2 normal, no murmur, click, rub or gallop  Abdomen: soft, non-tender; bowel sounds normal; no masses,  no organomegaly  Extremities: extremities normal, atraumatic, no cyanosis or edema  Neurologic: Mental status: Alert, oriented, thought content appropriate    Assessment:   Patient Active Problem List:     Acute CVA (cerebrovascular accident) ([facility] [address]    Plan:   IMPRESSION/RECOMMENDATIONS:     1. Hypokalemia on replacement K  - will give additional doses today   Chronic since 2009 per wife  Continue potassium supplement  Continue aldactone        2. Hypertension  BP above goal  Metoprolol and losartan on hold given the recent CVA  Follow     3.  Sp cva   r mca cva  Undergoing acute rehab  Left sided weakness         Hasit RADHA Dislaan

## 2021-02-28 NOTE — PROGRESS NOTES
OCCUPATIONAL THERAPY DAILY NOTE    Date:2021  Patient Name: Venus Trejo  MRN: [id number]  : 1955  Room: [address] [place] [address]   Referring Practitioner: Tab Banks MD  Diagnosis: CVA- RMCA  Additional Pertinent Hx: HLD, HTN, obese & hypothyroidism    Precautions: Fall Risk, L hemiparesis    Functional Assessment:   Date Status AE  Comments   Feeding 21 SBA     Grooming [address] A           Oral Care 21 Supervision     Bathing 21 UB: Min A  LB:Maximal Assist      UB Dressing 21 Mod A     LB Dressing 21 Dependent      Footwear 21 Max A Reacher, sock aid Instructed pt on use of AE to manage LE clothing. Pt able to doff socks with reacher but demoed decreased ability to manage sock aid due to limited L FMC and L UE ROM/ strength. Toileting 21 Dependent      Homemaking         Functional Transfers / Balance:   Date Status DME  Comments   Sit Balance 21 Stand by Assist   During exercises   Stand Balance 21 Minimal Assist      [] Tub  [] Shower   Transfer       Commode   Transfer 21 Min A     Functional   Mobility 21 Moderate Assist      Other:         Functional Exercises / Activity:  Pt participated in pulley exercises 2x2 mins to improve L UE ROM during ADLs. Pt also pushed/ pulled weighted derek box (3lbs) forward/ back and bilaterally all to improve strength/ ROM during self care tasks.     Sensory / Neuromuscular Re-Education:      Cognitive Skills:   Status Comments   Problem   Solving fair     Memory fair     Sequencing fair     Safety fair       Visual Perception:    Education:  AE management during LE dressing    [] Family teach completed on:    Pain Level: 0/10    Additional Notes: Patient has made good  progress during treatment sessions toward set goals. Therapy emphasis to obtain goals:Strengthening, Endurance Training, Neuromuscular Re-education, Patient/Caregiver Education & Training, ROM, Cognitive Reorientation, Equipment Evaluation, Education, & procurement, Self-Care / ADL, Balance Training, Gait Training, Home Management Training, Functional Mobility Training, Safety Education & Training, Positioning     [x] Continue with current OT Plan of care.   [] Prepare for Discharge     DISCHARGE RECOMMENDATIONS  Recommended DME:    Post Discharge Care:   []Home Independently  []Home with 24hr Care / Supervision []Home with Partial Supervision []Home with Home Health OT []Home with Out Pt OT []Other: ___   Comments:         Time in Time out Tx Time Breakdown  Variance:   First Session  9:05 9:35 [x] Individual Tx-30 Mins  [] Concurrent Tx -  [] Co-Tx -   [] Group Tx -   [] Time Missed -     Second Session   [] Individual Tx-   [] Concurrent Tx -  [] Co-Tx -   [] Group Tx -   [] Time Missed -     Third Session    [] Individual Tx-   [] Concurrent Tx -  [] Co-Tx -   [] Group Tx -   [] Time Missed -         Total Tx Time: 30 Mins        Henrry DE LOS SANTOS/JOHN [id number]

## 2021-03-01 LAB
ANION GAP SERPL CALCULATED.3IONS-SCNC: 8 MMOL/L (ref 7–16)
BUN BLDV-MCNC: 22 MG/DL (ref 8–23)
CALCIUM SERPL-MCNC: 8.9 MG/DL (ref 8.6–10.2)
CHLORIDE BLD-SCNC: 107 MMOL/L (ref 98–107)
CO2: 25 MMOL/L (ref 22–29)
CREAT SERPL-MCNC: 1.2 MG/DL (ref 0.7–1.2)
GFR AFRICAN AMERICAN: >60
GFR NON-AFRICAN AMERICAN: >60 ML/MIN/1.73
GLUCOSE BLD-MCNC: 103 MG/DL (ref 74–99)
POTASSIUM SERPL-SCNC: 3.6 MMOL/L (ref 3.5–5)
SODIUM BLD-SCNC: 140 MMOL/L (ref 132–146)

## 2021-03-01 PROCEDURE — 94660 CPAP INITIATION&MGMT: CPT

## 2021-03-01 PROCEDURE — 97129 THER IVNTJ 1ST 15 MIN: CPT

## 2021-03-01 PROCEDURE — 97130 THER IVNTJ EA ADDL 15 MIN: CPT

## 2021-03-01 PROCEDURE — 6370000000 HC RX 637 (ALT 250 FOR IP): Performed by: PHYSICAL MEDICINE & REHABILITATION

## 2021-03-01 PROCEDURE — 6370000000 HC RX 637 (ALT 250 FOR IP): Performed by: INTERNAL MEDICINE

## 2021-03-01 PROCEDURE — 97530 THERAPEUTIC ACTIVITIES: CPT

## 2021-03-01 PROCEDURE — 97535 SELF CARE MNGMENT TRAINING: CPT

## 2021-03-01 PROCEDURE — 80048 BASIC METABOLIC PNL TOTAL CA: CPT

## 2021-03-01 PROCEDURE — 1280000000 HC REHAB R&B

## 2021-03-01 PROCEDURE — 6360000002 HC RX W HCPCS: Performed by: INTERNAL MEDICINE

## 2021-03-01 PROCEDURE — 97112 NEUROMUSCULAR REEDUCATION: CPT

## 2021-03-01 PROCEDURE — 36415 COLL VENOUS BLD VENIPUNCTURE: CPT

## 2021-03-01 RX ADMIN — METOPROLOL SUCCINATE 25 MG: 25 TABLET, EXTENDED RELEASE ORAL at 08:22

## 2021-03-01 RX ADMIN — MESALAMINE 800 MG: 400 CAPSULE, DELAYED RELEASE ORAL at 08:22

## 2021-03-01 RX ADMIN — ASPIRIN 81 MG: 81 TABLET, CHEWABLE ORAL at 08:22

## 2021-03-01 RX ADMIN — MESALAMINE 800 MG: 400 CAPSULE, DELAYED RELEASE ORAL at 15:04

## 2021-03-01 RX ADMIN — SPIRONOLACTONE 50 MG: 25 TABLET ORAL at 08:22

## 2021-03-01 RX ADMIN — POTASSIUM CHLORIDE 40 MEQ: 1500 TABLET, EXTENDED RELEASE ORAL at 17:09

## 2021-03-01 RX ADMIN — ACETAMINOPHEN 650 MG: 325 TABLET ORAL at 21:55

## 2021-03-01 RX ADMIN — CLOPIDOGREL 75 MG: 75 TABLET, FILM COATED ORAL at 08:22

## 2021-03-01 RX ADMIN — POTASSIUM CHLORIDE 40 MEQ: 1500 TABLET, EXTENDED RELEASE ORAL at 11:55

## 2021-03-01 RX ADMIN — MESALAMINE 800 MG: 400 CAPSULE, DELAYED RELEASE ORAL at 20:17

## 2021-03-01 RX ADMIN — AMLODIPINE BESYLATE 5 MG: 5 TABLET ORAL at 08:22

## 2021-03-01 RX ADMIN — ENOXAPARIN SODIUM 40 MG: 40 INJECTION SUBCUTANEOUS at 08:22

## 2021-03-01 RX ADMIN — SERTRALINE 50 MG: 50 TABLET, FILM COATED ORAL at 08:22

## 2021-03-01 RX ADMIN — LEVOTHYROXINE SODIUM 75 MCG: 0.07 TABLET ORAL at 08:25

## 2021-03-01 RX ADMIN — ATORVASTATIN CALCIUM 40 MG: 40 TABLET, FILM COATED ORAL at 20:17

## 2021-03-01 RX ADMIN — LOSARTAN POTASSIUM 50 MG: 50 TABLET, FILM COATED ORAL at 08:22

## 2021-03-01 RX ADMIN — POTASSIUM CHLORIDE 40 MEQ: 1500 TABLET, EXTENDED RELEASE ORAL at 08:22

## 2021-03-01 ASSESSMENT — PAIN SCALES - GENERAL
PAINLEVEL_OUTOF10: 0
PAINLEVEL_OUTOF10: 0

## 2021-03-01 NOTE — PROGRESS NOTES
Physical Therapy    Facility/Department: [address] REHAB  Daily Treatment Note    NAME: Yoli Galvan  : 1955  MRN: [id number]    Date of Service: 3/1/2021    Evaluating Therapist: Jennifer Lundy PT, DPT    ROOM: [facility]  DIAGNOSIS: CVA  PRECAUTIONS: falls, L hemiparesis (UE worse than LE), sling, hx oral cancer, hx CVA in 2016 with mild chronic L hemiparesis, hx R meniscus repair in 2016, TSM, general diet  HPI: Pt is a 60year old male who developed sudden onset of left hemiparesis and was brought to [facility]. [facility] on 2021. He was not felt to be a candidate for t-PA. MRI brain not able to be done due to patient's BMI. CT perfusion showed small region of ischemic penumbra in the distribution of the R HEMA. Mobile unit MRI showed findings consistent with acute infarct in the right middle cerebral artery. He was put on antiplatelet agents. Social:  Pt is in town from [facility] with wife, visiting and staying with his daughter and her family ( and 25year old son, all work). In [facility], pt lives with wife in a 2 floor plan with 1 ANITA + 3 steps without HR to main floor. Bedroom and bathroom on first floor. Laundry in basement with full flight to reach. Wife able to provide supervision as needed. Pt may return to daughter's home which is a split level with 1 ANITA + 4 steps with R HR to reach main floor where bedroom and bathroom are located. Prior to admission: Pt ambulated with R SPC, was functionally independent. Initial Evaluation  21 AM    PM Short Term Goals Long Term Goals    Was pt agreeable to Eval/treatment? yes yes yes     Does pt have pain?  No c/o pain No c/o pain  Pt c/o of L foot pain and states pain is due to gout     Bed Mobility  Rolling: Cassi  Supine to sit: Cassi  Sit to supine: Cassi  Scooting: Cassi NT Rolling: SBA  Supine to sit:SBA   Sit to supine: SBA  Scooting: SBA SBA Mod I   Transfers Sit to stand: Cassi  Stand to sit: Cassi  Stand pivot: Cassi with R LBQC Sit to stand CGA Stand to sit CGA  Stand pivot CGA Sit to stand CGA  Stand to sit CGA  Stand pivot CGA SBA Mod I   Ambulation    75 feet with R LBQC with Cassi (WC follow) 150 feet x1 rep and R LBQC with CGA/Cassi 150 feet x1 rep and R LBQC with CGA/Cassi  150 feet with AAD with  feet with AAD with Mod I   Walking 10 feet on uneven surface TBA NT NT 10 feet with AAD with SBA 10 feet with AAD with Mod I   Wheel Chair Mobility NA NT NT     Car Transfers Cassi with R LBQC CGA  With R LBQC NT SBA Mod I   Stair negotiation: ascended and descended  4 steps with R rail with Cassi 8 steps with 1 HR with CGA (non-reciprocal pattern) NT 8 steps with 1 rail with SBA 12 steps with 1 rail with Mod I   Curb Step:   ascended and descended TBA N/T NT 7.5 inch step with AAD and SBA 7.5 inch step with AAD and Mod I   Picking up object off the floor TBA NT NT Will  2lb weight with SB assist Will  4lb weight with Mod I   BLE ROM WNL WNL      BLE Strength RLE grossly 5/5  L hip flexion 3+/5  L knee extension 4/5  L ankle DF/PF 4/5 RLE grossly 5/5  L hip flexion 3+/5  L knee extension 4/5  L ankle DF/PF 4/5      Balance  Static and dynamic standing balance Cassi with R LBQC Static and dynamic standing balance CGA with R LBQC      Date Family Teach Completed        Is additional Family Teaching Needed?   Y or N Y Y      Hindering Progress Hemiparesis Hemiparesis      PT recommended ELOS 3 weeks       Team's Discharge Plan        Therapist at Team Meeting          Therapeutic Exercise:   AM: Ambulating 250 feet x 1 rep with VALERIA walker with CGA to assist is steering VALERIA walker  Side stepping 35 feet without UE support, x 1 rep with RLE leading and x1 rep with LLE leading   Ambulating 100 feet x 1 rep without AD and Min A   PM: Standing while holding ball with BUE and stepping FW and BW with CGA, x10 reps with RLE leading and x 10 reps with LLE leading

## 2021-03-01 NOTE — PROGRESS NOTES
Physical Therapy    Facility/Department: [address] REHAB  Weekly Team Note    NAME: Fabien Viramontes  : 1955  MRN: [id number]    Date of Service: 3/1/2021    Evaluating Therapist: Kwadwo Shen PT, VINNY    ROOM: [facility]  DIAGNOSIS: CVA  PRECAUTIONS: falls, L hemiparesis (UE worse than LE), sling, hx oral cancer, hx CVA in 2016 with mild chronic L hemiparesis, hx R meniscus repair in 2016, TSM, general diet  HPI: Pt is a 60year old male who developed sudden onset of left hemiparesis and was brought to [facility]. [facility] on 2021. He was not felt to be a candidate for t-PA. MRI brain not able to be done due to patient's BMI. CT perfusion showed small region of ischemic penumbra in the distribution of the R HEMA. Mobile unit MRI showed findings consistent with acute infarct in the right middle cerebral artery. He was put on antiplatelet agents. Social:  Pt is in town from [facility] with wife, visiting and staying with his daughter and her family ( and 25year old son, all work). In [facility], pt lives with wife in a 2 floor plan with 1 ANITA + 3 steps without HR to main floor. Bedroom and bathroom on first floor. Laundry in basement with full flight to reach. Wife able to provide supervision as needed. Pt may return to daughter's home which is a split level with 1 ANITA + 4 steps with R HR to reach main floor where bedroom and bathroom are located. Prior to admission: Pt ambulated with R SPC, was functionally independent. Initial Evaluation  2/24/21 3/1/21     Comments    Short Term Goals Long Term Goals    Was pt agreeable to Eval/treatment? yes yes      Does pt have pain?  No c/o pain Pt c/o of moderate L foot pain  Pt states pain is due to gout as he has experienced this in the past. Pt able to continue with session/WB     Bed Mobility  Rolling: Cassi  Supine to sit: Cassi  Sit to supine: Cassi  Scooting: Cassi Rolling: SBA  Supine to sit:SBA   Sit to supine: SBA  Scooting: SBA  SBA Mod I Transfers Sit to stand: Cassi  Stand to sit: Cassi  Stand pivot: Cassi with R LBQC Sit to stand CGA  Stand to sit CGA  Stand pivot CGA Minor cueing to look at [facility] in order to center self with WC before sitting down SBA Mod I   Ambulation    75 feet with R LBQC with Cassi (WC follow) 150 feet x1 rep and R LBQC with CGA/Cassi Occasional toe catch during ambulation leading to minor LOB requiring assistance  150 feet with AAD with  feet with AAD with Mod I   Car Transfers Cassi with R LBQC CGA  With R LBQC  SBA Mod I   Stair negotiation: ascended and descended  4 steps with R rail with Cassi 8 steps with 1 HR with CGA (non-reciprocal pattern)  8 steps with 1 rail with SBA 12 steps with 1 rail with Mod I   Curb Step:   ascended and descended TBA N/T  7.5 inch step with AAD and SBA 7.5 inch step with AAD and Mod I   BLE ROM WNL WNL      BLE Strength RLE grossly 5/5  L hip flexion 3+/5  L knee extension 4/5  L ankle DF/PF 4/5 RLE grossly 5/5  L hip flexion 3+/5  L knee extension 4/5  L ankle DF/PF 4/5      Balance  Static and dynamic standing balance Cassi with R LBQC Static and dynamic standing balance CGA with R LBQC      Date Family Teach Completed        Is additional Family Teaching Needed? Y or N Y Y      Hindering Progress Hemiparesis Hemiparesis      PT recommended ELOS 3 weeks 2 weeks      Team's Discharge Plan  2 weeks      Therapist at [facility], Adam Mark PT, [place] [id number]          Date:  3/1/21  Supporting factors: Demonstrates good static standing weight-shifts and improvements in physical function/motor control of hemiparetic extremities    Barriers to discharge: BMI, dynamic standing balance  Additional comments: Continued education needed to promote improvements in L foot clearance, minor LOB when L toe catches during ambulation with [facility] however patient able to self-correct without hand-on assistance. Anticipate pt will not require AFO due to consistently improving motor function.    DME:  TBD After Care:  MARK Champion, PT, DPT  [phone]

## 2021-03-01 NOTE — PROGRESS NOTES
Speech Language Pathology  ACUTE REHABILITATION--DAILY PROGRESS NOTE          PATIENT NAME:  Naye Mendes      :  1955         TODAY'S DATE:  2021       ROOM:  [address] [place] [address]      ADMITTING DIAGNOSIS: Acute CVA (cerebrovascular accident) ([facility] [address] [I63.9]     SPEECH PATHOLOGY DIAGNOSIS:    Mild cognitive-linguistic deficits     THERAPY RECOMMENDATIONS:   Speech Pathology intervention is recommended 3-6 times per week for LOS or when goals are met with emphasis on the following:      Immediate/ STM for functional information to complete tasks as instructed and recall pertinent medical information with minimal and/or use of external memory aide training.   Problem solving/ insight/ judgement for various ADL/iADL tasks, including but not limited to: medication management and money/ finance management, overall safety awareness in the PLOF with 80% accuracy                                                                                                                                        FIMS SCORES                            Swallowing                          Current Status             7--Independent                         Short Term Goal         7--Independent                         Long Term Goal          7--Independent                Receptive                          Current Status             7--Independent                         Short Term Goal         7--Independent                         Long Term Goal          7--Independent                Expressive                          Current Status             7--Independent                         Short Term Goal         7--Independent                         Long Term Goal          7--Independent                Social Interaction                          Current Status             7--Independent                         Short Term Goal         7--Independent                         Long Term Goal          7--Independent Problem Solving                          Current Status             5--Supervision               Short Term Goal         6--Modified Independent                       Long Term Goal          6--Modified Independent                          Memory                          Current Status             5--Supervision               Short Term Goal         6--Modified Independent                       Long Term Goal          6--Modified Independent                              SWALLOWING:      Diet:  Regular consistency solids with thin liquids     Patient does not require supervision during meals. LANGUAGE:     WFL     COGNITION:       Patient participated in a functional cognitive activity with a focus on scanning, simple math, and making inferences. Patient read from a TV guide in order to respond to questions with ~80% accuracy with min-moderate verbal clues. Patient benefited from extended time to process and had most difficulty with inferential questions. Patient completed a cognitive everyday math activity on the iPad with intermediate (two-step simple addition and multiplication problems) with ~60% accuracy given moderate verbal cues. Patient had difficulty transferring numbers from math problems into calculator correctly. Patient benefited from extended processing time, cues to correct errors on calculator, and having the problem set up for him (I.e. \"take this number and multiply by ___\"). Safety:  fair +    SPEECH:     WFL    SP recommended after discharge:  no  Supervision recommended at discharge: Intermittent    Will continue SP intervention as per previously established POC.     Minute Tracking:    Individual therapy:     45 minutes  Concurrent therapy:  0 minutes  Group therapy:     0 minutes  Co-treatment therapy:    0 minutes    Total minutes for 3/1/2021: 45 minutes    Bebe Marc   SLP student intern

## 2021-03-01 NOTE — PROGRESS NOTES
Progress Note  Date:3/1/2021       Room:5505501-  Patient Galen Laura     YOB: 1955     Age:65 y.o. Subjective    Subjective:  Symptoms:  Stable. He reports weakness. Diet:  Adequate intake. Activity level: Impaired due to weakness. Pain:  He reports no pain. Review of Systems   Neurological: Positive for weakness. Objective         Vitals Last 24 Hours:  TEMPERATURE:  Temp  Av.5 °F (36.4 °C)  Min: 97.3 °F (36.3 °C)  Max: 97.6 °F (36.4 °C)  RESPIRATIONS RANGE: Resp  Av  Min: 18  Max: 18  PULSE OXIMETRY RANGE: SpO2  Av.5 %  Min: 93 %  Max: 96 %  PULSE RANGE: Pulse  Av  Min: 71  Max: 73  BLOOD PRESSURE RANGE: Systolic (68CRV), NSB:982 , Min:127 , NBS:495   ; Diastolic (62GNC), THD:93, Min:73, Max:89    I/O (24Hr): Intake/Output Summary (Last 24 hours) at 3/1/2021 0821  Last data filed at 3/1/2021 6979  Gross per 24 hour   Intake 600 ml   Output 600 ml   Net 0 ml     Objective:  General Appearance: In no acute distress. Vital signs: (most recent): Blood pressure 127/73, pulse 73, temperature 97.3 °F (36.3 °C), temperature source Temporal, resp. rate 18, height 5' 9\" (1.753 m), weight (!) 305 lb (138.3 kg), SpO2 93 %. Vital signs are normal.    Output: Producing urine and producing stool. Lungs:  Normal effort and normal respiratory rate. Breath sounds clear to auscultation. Heart: Normal rate. Regular rhythm. S1 normal.    Abdomen: Abdomen is soft. Bowel sounds are normal.   There is no abdominal tenderness. Extremities: Normal range of motion. Neurological: Patient is alert. (4/5 strength). Labs/Imaging/Diagnostics    Labs:  CBC:No results for input(s): WBC, RBC, HGB, HCT, MCV, RDW, PLT in the last 72 hours.   CHEMISTRIES:  Recent Labs     21  0447 21  0409    140   K 3.2* 3.6    107   CO2 28 25   BUN 19 22   CREATININE 1.1 1.2   GLUCOSE 97 103* PT/INR:No results for input(s): PROTIME, INR in the last 72 hours. APTT:No results for input(s): APTT in the last 72 hours. LIVER PROFILE:No results for input(s): AST, ALT, BILIDIR, BILITOT, ALKPHOS in the last 72 hours. Imaging Last 24 Hours:  No results found. Assessment//Plan           Hospital Problems           Last Modified POA    Acute CVA (cerebrovascular accident) ([facility] [address] 2/23/2021 Yes      Assessment:      Date   Status   AE    Comments     Feeding   2/24/21   SBA             Grooming   2/2721   Min A                   Oral Care   2/27/21   Supervision             Bathing   2/27/21   UB: Min A    LB:Maximal Assist              UB Dressing   2/27/21   Mod A             LB Dressing   2/24/21   Dependent              Footwear   2/28/21   Max A   Reacher, sock aid   Instructed pt on use of AE to manage LE clothing. Pt able to doff socks with reacher but demoed decreased ability to manage sock aid due to limited L FMC and L UE ROM/ strength. Toileting   2/24/21   Dependent              Homemaking                          Functional Transfers / Balance:      Date Status DME  Comments   Sit Balance 2/28/21   Stand by Assist    During exercises   Stand Balance 2/26/21   Minimal Assist        []? Tub  []? Shower   Transfer           Commode   Transfer 2/26/21   Min A       Functional   Mobility 2/24/21   Moderate Assist        Other:               Assessment:    Condition: In stable condition. Improving.   (CVA). Plan:   Encourage ambulation. (Tolerating therapy well  Improved strength but still has poor coordination  Labs have been stable).        Electronically signed by Edwardo Patel MD on 3/1/21 at 8:21 AM EST

## 2021-03-01 NOTE — PROGRESS NOTES
OCCUPATIONAL THERAPY DAILY NOTE    Date:3/1/2021  Patient Name: Leslee Banks  MRN: [id number]  : 1955  Room: [address] [place] [address]   Referring Practitioner: Dio Best MD  Diagnosis: CVA- RMCA  Additional Pertinent Hx: HLD, HTN, obese & hypothyroidism    Precautions: Fall Risk, L hemiparesis    Functional Assessment:   Date Status AE  Comments   Feeding 3/1/21 SBA  Pt able to open packages using 1 handed techniques for difficult items, pt able to use B hands to open light objects. Pt able to self feed   Grooming 3/1/21 Min A  Assist for thoroughness with deordant         Oral Care 3/1/21 Supervision  Seated for oral hygiene, pt able to open cap and cherri on toothbrush    Bathing 3/1/21 UB: Min A  LB:Mod A  Full bathing task seated/standing in shower, assist with B feet, thoroughness with RUE, and balance when standing for keyon areas   UB Dressing 3/1/21 Min A  Pt demo'ing F+ lg dressing techniques, assist to pull shirt down back on L side   LB Dressing 3/1/21 Mod A No AE Pt able to use crossing of leg technique to thread BLEs into underwear/pants, assist with pants over L buttocks   Footwear 3/1/21 Min A  adapted shoe laces Pt used crossing of leg technique and able to use B hands to open sock to thread onto feet.  Therapist adapted shoe laces and pt able to cherri shoes   Toileting 3/1/21 Mod A  Assist with balance with keyon hygiene and pants management   Homemaking         Functional Transfers / Balance:   Date Status DME  Comments   Sit Balance 3/1/21 Stand by Assist      Stand Balance 3/1/21 Minimal Assist      [x] Tub        [x] Shower   Transfer 3/1/21        3/1/21 Min A        Min A LBQC, large shower chair Sit down method onto extended tub bench, pt able to bring BLEs in/out of tub    SPT in/out of shower   Commode   Transfer 3/1/21 [facility]    Functional   Mobility 3/1/21 Min A LBQC    Other:         Functional Exercises / Activity: Pt sitting in chair upon arrival to . Completed ADL, see above for assessment. Pt appeared to have tolerated session well and has increased independence/strength/coordination with ADLs/functional transfers, demo'ing F+ safety. Sensory / Neuromuscular Re-Education:  Pt tolerated InMotion robotic arm evaluation of his LUE using 14cm [place]. Pt sat in chair with arm straps for positioning purpose. Pt tolerated evaluation, pre & post testing along with random protocol & 1 maze. Pt tolerated a total of 244 total movement using his LUE. Pt demo the following during the evaluation: smoothness 0.303, reach error 0.050, path error 0.013, initiation time 0.060 seconds, [place] independent 0.571, [place] size 0.018, hold deviation 0.061 & resistance 0.113. During treatment pt made the following gains: robot initiation improved from 63/80 to 68/80, distance from a target decreased from 12 to 10, robot power decreased from 80 to 75, distance from a straight line decreased from 9 to 8. Pt made gains in his pre & post test in the following areas: smoothness improved from 0.310 to 0.393, reach error decreased from 0.016 to 0.011 & initiation time decreased from 0.12 seconds to 0.004 seconds. Pt shown his results & was pleased with his progress.  Plan to implement Inmotion arm treatment to improve neurological function of his LUE to improve pt ability to complete ADLs & IADLs    Cognitive Skills:   Status Comments   Problem   Solving fair     Memory fair     Sequencing fair     Safety fair       Visual Perception:    Education:  AE management during LE dressing  3/1/21: shoe laces adapted for ease with donning shoes    [] Family teach completed on:    Pain Level: 0/10    Additional Notes: Patient has made good  progress during treatment sessions toward set goals. Therapy emphasis to obtain goals:Strengthening, Endurance Training, Neuromuscular Re-education, Patient/Caregiver Education & Training, ROM, Cognitive Reorientation, Equipment Evaluation, Education, & procurement, Self-Care / ADL, Balance Training, Gait Training, Home Management Training, Functional Mobility Training, Safety Education & Training, Positioning     [x] Continue with current OT Plan of care.   [] Prepare for Discharge     DISCHARGE RECOMMENDATIONS  Recommended DME: extended tub bench    Post Discharge Care:   []Home Independently  []Home with 24hr Care / Supervision []Home with Partial Supervision []Home with Home Health OT []Home with Out Pt OT []Other: ___   Comments:         Time in Time out Tx Time Breakdown  Variance:   First Session  0700 0800 [x] Individual Tx-60  [] Concurrent Tx -  [] Co-Tx -   [] Group Tx -   [] Time Missed -     Second Session [id number] [x] Individual Tx- 45  [] Concurrent Tx -  [] Co-Tx -   [] Group Tx -   [] Time Missed -     Third Session    [] Individual Tx-   [] Concurrent Tx -  [] Co-Tx -   [] Group Tx -   [] Time Missed -         Total Tx Time:105 Min I     Fawn Mcdermott, [address] [place], OTR/L [id number]  Topher Souza OTR/L [id number]

## 2021-03-02 PROCEDURE — 6370000000 HC RX 637 (ALT 250 FOR IP): Performed by: INTERNAL MEDICINE

## 2021-03-02 PROCEDURE — 97530 THERAPEUTIC ACTIVITIES: CPT

## 2021-03-02 PROCEDURE — 6360000002 HC RX W HCPCS: Performed by: INTERNAL MEDICINE

## 2021-03-02 PROCEDURE — 97110 THERAPEUTIC EXERCISES: CPT

## 2021-03-02 PROCEDURE — 97129 THER IVNTJ 1ST 15 MIN: CPT | Performed by: SPEECH-LANGUAGE PATHOLOGIST

## 2021-03-02 PROCEDURE — 6370000000 HC RX 637 (ALT 250 FOR IP): Performed by: PHYSICAL MEDICINE & REHABILITATION

## 2021-03-02 PROCEDURE — 97130 THER IVNTJ EA ADDL 15 MIN: CPT | Performed by: SPEECH-LANGUAGE PATHOLOGIST

## 2021-03-02 PROCEDURE — 1280000000 HC REHAB R&B

## 2021-03-02 PROCEDURE — 94660 CPAP INITIATION&MGMT: CPT

## 2021-03-02 RX ORDER — INDOMETHACIN 25 MG/1
25 CAPSULE ORAL
Status: DISCONTINUED | OUTPATIENT
Start: 2021-03-02 | End: 2021-03-05

## 2021-03-02 RX ADMIN — ACETAMINOPHEN 650 MG: 325 TABLET ORAL at 08:05

## 2021-03-02 RX ADMIN — INDOMETHACIN 25 MG: 25 CAPSULE ORAL at 16:46

## 2021-03-02 RX ADMIN — LEVOTHYROXINE SODIUM 75 MCG: 0.07 TABLET ORAL at 11:37

## 2021-03-02 RX ADMIN — MESALAMINE 800 MG: 400 CAPSULE, DELAYED RELEASE ORAL at 13:40

## 2021-03-02 RX ADMIN — ATORVASTATIN CALCIUM 40 MG: 40 TABLET, FILM COATED ORAL at 20:20

## 2021-03-02 RX ADMIN — POTASSIUM CHLORIDE 40 MEQ: 1500 TABLET, EXTENDED RELEASE ORAL at 07:58

## 2021-03-02 RX ADMIN — AMLODIPINE BESYLATE 5 MG: 5 TABLET ORAL at 07:59

## 2021-03-02 RX ADMIN — ASPIRIN 81 MG: 81 TABLET, CHEWABLE ORAL at 07:59

## 2021-03-02 RX ADMIN — METOPROLOL SUCCINATE 25 MG: 25 TABLET, EXTENDED RELEASE ORAL at 07:58

## 2021-03-02 RX ADMIN — POTASSIUM CHLORIDE 40 MEQ: 1500 TABLET, EXTENDED RELEASE ORAL at 11:37

## 2021-03-02 RX ADMIN — CLOPIDOGREL 75 MG: 75 TABLET, FILM COATED ORAL at 07:59

## 2021-03-02 RX ADMIN — MESALAMINE 800 MG: 400 CAPSULE, DELAYED RELEASE ORAL at 20:20

## 2021-03-02 RX ADMIN — ENOXAPARIN SODIUM 40 MG: 40 INJECTION SUBCUTANEOUS at 08:00

## 2021-03-02 RX ADMIN — POTASSIUM CHLORIDE 40 MEQ: 1500 TABLET, EXTENDED RELEASE ORAL at 16:45

## 2021-03-02 RX ADMIN — MESALAMINE 800 MG: 400 CAPSULE, DELAYED RELEASE ORAL at 07:59

## 2021-03-02 RX ADMIN — LOSARTAN POTASSIUM 50 MG: 50 TABLET, FILM COATED ORAL at 07:59

## 2021-03-02 RX ADMIN — SERTRALINE 50 MG: 50 TABLET, FILM COATED ORAL at 07:59

## 2021-03-02 RX ADMIN — INDOMETHACIN 25 MG: 25 CAPSULE ORAL at 11:37

## 2021-03-02 RX ADMIN — SPIRONOLACTONE 50 MG: 25 TABLET ORAL at 07:59

## 2021-03-02 ASSESSMENT — PAIN SCALES - GENERAL: PAINLEVEL_OUTOF10: 1

## 2021-03-02 NOTE — PROGRESS NOTES
Comprehensive Nutrition Assessment    Type and Reason for Visit:  RD Nutrition Re-Screen/LOS    Nutrition Recommendations/Plan: Continue current diet    Nutrition Assessment:  Pt admit to ARU s/p CVA. Noted % PO intakes since admit. Will continue to monitor. Malnutrition Assessment:  Malnutrition Status:  No malnutrition    Context:  Acute Illness     Findings of the 6 clinical characteristics of malnutrition:  Energy Intake:  No significant decrease in energy intake  Weight Loss:  Unable to assess(no longterm wt hx on file)     Body Fat Loss:  No significant body fat loss     Muscle Mass Loss:  No significant muscle mass loss    Fluid Accumulation:  No significant fluid accumulation     Strength:  Not Performed    Nutrition Related Findings:  A&Ox4, active BS, soft abd, no edema, -I/Os      Wounds:  None       Current Nutrition Therapies:    DIET GENERAL;     Anthropometric Measures:  · Height: 5' 9\" (175.3 cm)  · Current Body Weight: 305 lb (138.3 kg)(3/2)   · Admission Body Weight: 308 lb (139.7 kg)(2/23 bed scale)    · Usual Body Weight: 307 lb (139.3 kg)(2/21 bed scale, per EMR)     · Ideal Body Weight: 160 lbs; % Ideal Body Weight 190.6 %   · BMI: 45  · BMI Categories: Obese Class 3 (BMI 40.0 or greater)       Nutrition Diagnosis:   No nutrition diagnosis at this time     Nutrition Interventions:   Food and/or Nutrient Delivery:  Continue Current Diet  Nutrition Education/Counseling:  Education not indicated   Coordination of Nutrition Care:  Continue to monitor while inpatient    Goals:  pt to consume >75% meals       Nutrition Monitoring and Evaluation:   Food/Nutrient Intake Outcomes:  Food and Nutrient Intake  Physical Signs/Symptoms Outcomes:  Biochemical Data, GI Status, Fluid Status or Edema, Nutrition Focused Physical Findings, Skin, Weight     Discharge Planning:    No discharge needs at this time     Electronically signed by Vaishali Jorge, MS, RD, LD on 3/2/21 at 2:58 PM EST Contact: [phone]

## 2021-03-02 NOTE — PATIENT CARE CONFERENCE
[address] [place] [address]  ACUTE REHABILITATION  TEAM CONFERENCE NOTE/PATIENT PLAN OF CARE    Date: 3/2/2021  Admission date: 2021  Patient Name: Yasmine Price        MRN: [id number]    : 1955  (63 y.o.)  Gender: male   Rehab diagnosis/surgery with date:  Right MCA stroke 21  Impairment Group Code:  1.1      MEDICAL/FUNCTIONAL HISTORY/STATUS:  history of hypertension, hyperlipidemia,  morbid obesity, acute gout flare up, past history of stroke    Consultations/Labs/X-rays: nephrology following for hypertension, hypokalemia  Results for Moise Galarza (MRN [id number]) as of 3/2/2021 10:31   Ref. Range 3/1/2021 04:09   Sodium Latest Ref Range: 132 - 146 mmol/L 140   Potassium Latest Ref Range: 3.5 - 5.0 mmol/L 3.6   Chloride Latest Ref Range: 98 - 107 mmol/L 107   CO2 Latest Ref Range: 22 - 29 mmol/L 25   BUN Latest Ref Range: 8 - 23 mg/dL 22   Creatinine Latest Ref Range: 0.7 - 1.2 mg/dL 1.2       MEDICATION UPDATE:  indocin 25 mg  for gout three times daily.  Klor-Con tabs    NURSING :    Bowel:   Always Continent  []   Occasionally incontinent  [x] one episode of urgency, could not hold it  Frequently incontinent  []   Always incontinent  []   Not occurred  []     Bladder:   Always Continent  [x]    Incontinent less than daily[]   Incontinent  daily []   Always incontinent  []   No urine output    []   Indwelling catheter []       Toilet Hygiene:   Current level : supervision  Short term Toilet hygiene goal: supervision  Long term toilet hygiene goal:  Modified independent    Skin integrity: intact  Pain: gout pain in feet, started on Indocin    NUTRITION    Diet  general  Liquid consistency   thin    SOCIAL INFORMATION:  Lives with: spouse  Prior community services:  none  Home Architecture:  multi level, 3 entry with 1 rail,  2steps up to main level, 13 down to basement  Prior Level of function:  independent, did not drive, retired in  due to past sroke  DME:  straight cane, rollator FAMILY / PATIENT EDUCATION:  safety and self care are ongoing with patient    PHYSICAL THERAPY    Bed mobility:   Current level: standby assist  Short term bed mobility goal: standby assist  Long term bed mobility goal: Modified independent    Chair/bed transfers:  Current level: Contact guard assist-min assist   Short term Chair/bed transfers goal: standby assist  Long term Chair/bed transfers goal: Modified independent      Ambulation:   Current level: 150 ft  with a large based quad cane atmin assist, occasional toe catch with  minor loss of balance  Short term ambulation goal: 150 ft at standby assist  Long term ambulation goal: 400 ft.  at Modified independent      Car transfers:   Current level: min assist  Short term car transfers goal: standby assist  Long term car transfers goal:Modified independent    Stairs:   Current level : 8 with 1 rail at min assist  Short term stairs goal: standby assist  Long term stairs goal: 12 at Modified independent    Lower Extremity Strength Issues:    RLE grossly 5/5  L hip flexion 3+/5  L knee extension 4/5  L ankle DF/PF 4/5        Other comments: static and dynamic standing balance are min assist with the quad cane      OCCUPATIONAL THERAPY:      Tub/shower:   Current level: min assist with quad cane  and verbal cues  for foot clearance  Short term tub/shower goal: Contact guard assist  Long term tub/shower goal: standby assist      Feeding:  Current level: can use 1 hand, Modified independent  Short term feeding goal: met  Long term feeding goal: Modified independent    Grooming:   Current level: supervision-min assist  Short term grooming goal: supervision  Long term grooming goal: Modified independent    Bathing:  Current level: shower level is mod assist  Short term bathing goal: min assist  Long term bathing goal: standby assist    Homemaking:   Current level: to be assessed    Upper body dressing:  Current level: min assist, obesity hinders Short term upper body dressing goal: supervision  Long term upper body dressing goal: supervision    Lower body dressing:                                                                          Current level: mod assist             Short term lower body dressing goal: min assist          Long term lower body dressing goal: standby assist            Footwear  Current level: min assist  Short term goal: Contact guard assist  Long term goal:standby assist      Toilet transfer:   Current level: with a quad cane at  min assist  Short term toilet transfer goal: Contact guard assist  Long term toilet transfer goal: standby assist    Upper body strength issues: left upper 3/5      Sensory / Neuromuscular Re-Education:  Pt tolerated InMotion robotic arm evaluation of his LUE using 14cm [place]. Pt sat in chair with arm straps for positioning purpose. Pt tolerated evaluation, pre & post testing along with random protocol & 1 maze. Pt tolerated a total of 244 total movement using his LUE. Pt demo the following during the evaluation: smoothness 0.303, reach error 0.050, path error 0.013, initiation time 0.060 seconds, [place] independent 0.571, [place] size 0.018, hold deviation 0.061 & resistance 0.113. During treatment pt made the following gains: robot initiation improved from 63/80 to 68/80, distance from a target decreased from 12 to 10, robot power decreased from 80 to 75, distance from a straight line decreased from 9 to 8. Pt made gains in his pre & post test in the following areas: smoothness improved from 0.310 to 0.393, reach error decreased from 0.016 to 0.011 & initiation time decreased from 0.12 seconds to 0.004 seconds. Pt shown his results & was pleased with his progress.  Plan to implement Inmotion arm treatment to improve neurological function of his LUE to improve pt ability to complete ADLs & IADLs      SPEECH THERAPY  Swallowing:  Current level:  independent  Short term swallowing goal: independent Long term swallowing Goal: independent    Comprehension:   Current level: independent  Short term comprehension goal: independent  Long term comprehension goal: independent    Expression:   Current level: independent  Short term expression goal: independent  Long term expression goal: independent    Problem solving:   Current level: supervision  Short term problem solving goal: Modified independent  Long term problem solving goal: Modified independent    Memory:  Current level: supervision  Short term memory goal: Modified independent  Long term memory goal: Modified independent    Social interaction:  independent    Safety awareness: fair      Patient/family's personal goals: \"be normal again, clean and cook\"  Factors supporting goal achievement:  prior independence, motivated  Factors hindering goal achievement:  morbid obesity, left side weakness      Discharge Plan   Estimated Length of Stay: 2 weeks            Destination: home  Services at Discharge: to be assessed  Equipment at Discharge: to be assessed      INTERDISCIPLINARY TEAM/PHYSICIAN RECOMMENDATION AND/OR [address] [place] [address] Dr:  continue working towards goals, monitor response to Indocin for gout pain      I approve the established interdisciplinary plan of care as documented within the medical record of Sadi Mayank. Electronically signed by Leonor Patton RN  on 3/2/2021 at 8:27 AM  The following interdisciplinary team members were present:  CALE Ramos, MSSA,LSW  Richard Avonne Olszewski.  Michael Jacob, DPT  Olivier Nicholas, OTDAVIDA Ardon, [address], CCC-SLP

## 2021-03-02 NOTE — PROGRESS NOTES
Progress Note  Date:3/2/2021       Room:5501/5501-  Patient Jerrica Pastor     YOB: 1955     Age:65 y.o. Subjective    Subjective:  Symptoms:  Stable. He reports weakness. Diet:  Adequate intake. Activity level: Impaired due to weakness. Pain:  He complains of pain that is moderate. Review of Systems   Neurological: Positive for weakness. Objective         Vitals Last 24 Hours:  TEMPERATURE:  Temp  Av.7 °F (37.1 °C)  Min: 98.7 °F (37.1 °C)  Max: 98.7 °F (37.1 °C)  RESPIRATIONS RANGE: Resp  Av  Min: 18  Max: 18  PULSE OXIMETRY RANGE: SpO2  Av %  Min: 94 %  Max: 94 %  PULSE RANGE: Pulse  Av  Min: 80  Max: 80  BLOOD PRESSURE RANGE: Systolic (03IWK), YPP:201 , Min:124 , WJT:862   ; Diastolic (42MZZ), RZT:11, Min:81, Max:81    I/O (24Hr): Intake/Output Summary (Last 24 hours) at 3/2/2021 0812  Last data filed at 3/1/2021 2305  Gross per 24 hour   Intake 1020 ml   Output 200 ml   Net 820 ml     Objective:  General Appearance: In no acute distress. Vital signs: (most recent): Blood pressure 124/81, pulse 80, temperature 98.7 °F (37.1 °C), temperature source Temporal, resp. rate 18, height 5' 9\" (1.753 m), weight (!) 305 lb 11.2 oz (138.7 kg), SpO2 94 %. Vital signs are normal.    Output: Producing urine and producing stool. Lungs:  Normal effort and normal respiratory rate. Breath sounds clear to auscultation. Heart: Normal rate. Regular rhythm. S1 normal and S2 normal.    Abdomen: Abdomen is soft. Bowel sounds are normal.   There is no abdominal tenderness. Extremities: Decreased range of motion. (Gout pain left foot)  Neurological: Patient is alert. (4-/5 strength). Labs/Imaging/Diagnostics    Labs:  CBC:No results for input(s): WBC, RBC, HGB, HCT, MCV, RDW, PLT in the last 72 hours.   CHEMISTRIES:  Recent Labs     21  0447 21  0409    140   K 3.2* 3.6    107   CO2 28 25   BUN 19 22   CREATININE 1.1 1.2 GLUCOSE 97 103*     PT/INR:No results for input(s): PROTIME, INR in the last 72 hours. APTT:No results for input(s): APTT in the last 72 hours. LIVER PROFILE:No results for input(s): AST, ALT, BILIDIR, BILITOT, ALKPHOS in the last 72 hours. Imaging Last 24 Hours:  No results found. Assessment//Plan           Hospital Problems           Last Modified POA    Acute CVA (cerebrovascular accident) ([facility] [address] 2/23/2021 Yes        Assessment:    Condition: In stable condition. Improving. (CVA  Acute gout). Plan:   Encourage ambulation. (Tolerating therapy in general  Developed acute gout pain last night  Has had good response to Indocin in the past  I will restart that  Recheck labs tomorrow  Review in team today).        Electronically signed by Jackson Avitia MD on 3/2/21 at 8:12 AM EST

## 2021-03-02 NOTE — PROGRESS NOTES
OCCUPATIONAL THERAPY DAILY NOTE    Date:3/2/2021  Patient Name: Bladimir Espinosa  MRN: [id number]  : 1955  Room: [address] [place] [address]   Referring Practitioner: Cheri Magallon MD  Diagnosis: CVA- RMCA  Additional Pertinent Hx: HLD, HTN, obese & hypothyroidism    Precautions: Fall Risk, L hemiparesis    Functional Assessment:   Date Status AE  Comments   Feeding 3/2/21 SBA  Pt able to reach for cup of water and drink using L hand   Grooming 3/1/21 [place] A           Oral Care 3/1/21 Supervision     Bathing 3/1/21 UB: Min A  LB:Mod A     UB Dressing 3/1/21 Min A     LB Dressing 3/1/21 Mod A No AE    Footwear 3/1/21 Min A  adapted shoe laces    Toileting 3/1/21 Mod A     Homemaking         Functional Transfers / Balance:   Date Status DME  Comments   Sit Balance 3/2/21 Stand by Assist      Stand Balance 3/2/21 Minimal Assist      [x] Tub        [x] Shower   Transfer 3/1/21        3/1/21 Min A        Min A LBQC, large shower chair    Commode   Transfer 3/1/21 [facility]    Functional   Mobility 3/1/21 Min A LBQC    Other:         Functional Exercises / Activity:  Pt sitting in chair upon arrival to OT gym. Engaged in 5# derek box, standing, 3x15 reps (forward, L, R) to increase core/trunk strength/standing balance/tolerance, strength and, BUE ROM for ease with ADLs. Pt able to stand at SBA 3x5 mins. Engaged in overheard arm pulleys, 2x3 mins, to increase BUE ROM for ease with ADLs. Engaged in therex, seated, using 1.5# dowel chaka, 3x20 reps (shoulder flex), to increase BUE strength for ease with functional transfers. Pt able to reach full ROM with LUE this date. Participated in armbike exercise, seated, 2x5 mins, to increase endurance for ease with ADLs. Engaged in therex, standing at table, using medium difficulty ROM arc to increase LUE ROM and standing balance/tolerance. Pt able to complete all rings using LUE, 2x with no physical assist. Pt able to stand 2x6 mins at SBA. Engaged in therex using red eliza, 3x15 reps (supination/pronation/twists) to increase BUE strength for ease with functional transfers. Pt appeared to have tolerated session well. Sensory / Neuromuscular Re-Education:  Pt tolerated InMotion robotic arm evaluation of his LUE using 14cm [place]. Pt sat in chair with arm straps for positioning purpose. Pt tolerated evaluation, pre & post testing along with random protocol & 1 maze. Pt tolerated a total of 244 total movement using his LUE. Pt demo the following during the evaluation: smoothness 0.303, reach error 0.050, path error 0.013, initiation time 0.060 seconds, [place] independent 0.571, [place] size 0.018, hold deviation 0.061 & resistance 0.113. During treatment pt made the following gains: robot initiation improved from 63/80 to 68/80, distance from a target decreased from 12 to 10, robot power decreased from 80 to 75, distance from a straight line decreased from 9 to 8. Pt made gains in his pre & post test in the following areas: smoothness improved from 0.310 to 0.393, reach error decreased from 0.016 to 0.011 & initiation time decreased from 0.12 seconds to 0.004 seconds. Pt shown his results & was pleased with his progress.  Plan to implement Inmotion arm treatment to improve neurological function of his LUE to improve pt ability to complete ADLs & IADLs    Cognitive Skills:   Status Comments   Problem   Solving fair     Memory fair     Sequencing fair     Safety fair       Visual Perception:    Education:  AE management during LE dressing  3/1/21: shoe laces adapted for ease with donning shoes    [] Family teach completed on:

## 2021-03-02 NOTE — CARE COORDINATION
Per team meeting: JULY 2 weeks. Updated pt and  wife, Andreas Reese; Andreas Reese is currently in [place], but is heading down today to stay with her daughter ([address] [facility], [id number]) until pt discharges to take him back home to [place] and have him receive therapy there. Upon d/c, pt only plans to stay at daughter's house for one day before going back to [place]. D/c goals range from stand by assistance to independent. Acute gout episode. Requires cueing/ occasional verbal cues. Toe drags at times creating a fall risk. Left arm - in motion arm.     DME - TBD    Aftercare - TBD    FT - 3/5/2021. 8:30am    David ROBERSON Intern  Vasu Arauz  3/2/2021

## 2021-03-02 NOTE — PROGRESS NOTES
03/01/21 1907   Attendance   Activity Games  (SEWORKSnury)   Participation Active participation   Therapeutic Recreation   Community Reintegration Demonstrates ability to complete social goals   Leisure Education Demonstrates knowledge of benefits of leisure involvement  Angel Armijo stated \"Used my hand and brain \" as a benefit .)   Leisure Attitude/Participation Participates in 1:1 structured activity   Time Spent With Patient   Minutes 35

## 2021-03-02 NOTE — PROGRESS NOTES
Speech Language Pathology  ACUTE REHABILITATION--DAILY PROGRESS NOTE          PATIENT NAME:  Sherren Philips      :  1955         TODAY'S DATE:  2021       ROOM:  [address] [place] [address]      ADMITTING DIAGNOSIS: Acute CVA (cerebrovascular accident) ([facility] [address] [I63.9]     SPEECH PATHOLOGY DIAGNOSIS:    Mild cognitive-linguistic deficits     THERAPY RECOMMENDATIONS:   Speech Pathology intervention is recommended 3-6 times per week for LOS or when goals are met with emphasis on the following:      Immediate/ STM for functional information to complete tasks as instructed and recall pertinent medical information with minimal and/or use of external memory aide training.   Problem solving/ insight/ judgement for various ADL/iADL tasks, including but not limited to: medication management and money/ finance management, overall safety awareness in the PLOF with 80% accuracy                                                                                                                                        FIMS SCORES                            Swallowing                          Current Status             7--Independent                         Short Term Goal         7--Independent                         Long Term Goal          7--Independent                Receptive                          Current Status             7--Independent                         Short Term Goal         7--Independent                         Long Term Goal          7--Independent                Expressive                          Current Status             7--Independent                         Short Term Goal         7--Independent                         Long Term Goal          7--Independent                Social Interaction                          Current Status             7--Independent                         Short Term Goal         7--Independent                         Long Term Goal          7--Independent Problem Solving                          Current Status             5--Supervision               Short Term Goal         6--Modified Independent                       Long Term Goal          6--Modified Independent                          Memory                          Current Status             5--Supervision               Short Term Goal         6--Modified Independent                       Long Term Goal          6--Modified Independent                              SWALLOWING:      Diet:  Regular consistency solids with thin liquids     Patient does not require supervision during meals. LANGUAGE:     WFL     COGNITION:       Patient completed a functional cognitive activity with a focus on visual scanning, recall, and everyday math skills. Patient was given a menu for [employer], a budget, and instructions for ordering. Patient benefited from minimal cues to visually scan and locate desired items on menu. Patient had fair- recall of starting instructions and budget with moderate cues. Patient benefited from minimal cues to correct and a calculator to determine cost of meal and tip. Patient had fair outcome when identifying problems and solutions from various common scenarios. Patient benefited from minimal cues to elaborate responses when identifing problem but offered reasonable solutions. Patient completed cognitive activity on a iPad in which he recalled a playing card in order to locate the playing card when displayed on the screen in a series of other cards (cards were displayed individually for ~1.5 seconds at a time). Patient was 100% accurate with no assistance or cueing. Safety:  fair +    SPEECH:     WFL    SP recommended after discharge:  no  Supervision recommended at discharge: Intermittent    Will continue SP intervention as per previously established POC.     Minute Tracking:    Individual therapy:     45 minutes Concurrent therapy:  0 minutes  Group therapy:     0 minutes  Co-treatment therapy:    0 minutes    Total minutes for 3/2/2021: 45 minutes    Cinthia Fresh   SLP student intern

## 2021-03-02 NOTE — PROGRESS NOTES
Physical Therapy    Facility/Department: [address] REHAB  Daily Treatment Note    NAME: Phil Thomas  : 1955  MRN: [id number]    Date of Service: 3/2/2021    Evaluating Therapist: Timbo Dorsey PT, DPT    ROOM: [address]  DIAGNOSIS: CVA  PRECAUTIONS: falls, L hemiparesis (UE worse than LE), sling, hx oral cancer, hx CVA in 2016 with mild chronic L hemiparesis, hx R meniscus repair in 2016, TSM, general diet  HPI: Pt is a 60year old male who developed sudden onset of left hemiparesis and was brought to [facility]. Elisa Bridges on 2021. He was not felt to be a candidate for t-PA. MRI brain not able to be done due to patient's BMI. CT perfusion showed small region of ischemic penumbra in the distribution of the R HEMA. Mobile unit MRI showed findings consistent with acute infarct in the right middle cerebral artery. He was put on antiplatelet agents. Social:  Pt is in town from [place] with wife, visiting and staying with his daughter and her family ( and 25year old son, all work). In [place], pt lives with wife in a 2 floor plan with 1 ANITA + 3 steps without HR to main floor. Bedroom and bathroom on first floor. Laundry in basement with full flight to reach. Wife able to provide supervision as needed. Pt may return to daughter's home which is a split level with 1 ANITA + 4 steps with R HR to reach main floor where bedroom and bathroom are located. Prior to admission: Pt ambulated with R SPC, was functionally independent. Initial Evaluation  21 AM    PM Short Term Goals Long Term Goals    Was pt agreeable to Eval/treatment? yes yes yes     Does pt have pain?  No c/o pain Pt c/o mild L foot pain (due to gout) Pt c/o mild L foot pain (due to gout)     Bed Mobility  Rolling: Cassi  Supine to sit: Cassi  Sit to supine: Cassi  Scooting: Cassi NT NT SBA Mod I   Transfers Sit to stand: Cassi  Stand to sit: Cassi  Stand pivot: Cassi with R LBQC Sit to stand: SBA  Stand to sit: SBA Stand pivot: CGA with R LBQC  Sit to stand: SBA  Stand to sit: SBA  Stand pivot: CGA with R LBQC  SBA Mod I   Ambulation    75 feet with R LBQC with Cassi (WC follow) 175 feet x1 rep and R LBQC with  feet x1 rep and R LBQC with CGA  150 feet with AAD with  feet with AAD with Mod I   Walking 10 feet on uneven surface TBA NT 10 feet with LBQC with CGA 10 feet with AAD with SBA 10 feet with AAD with Mod I   Wheel Chair Mobility NA NT NT     Car Transfers Cassi with R LBQC NT NT SBA Mod I   Stair negotiation: ascended and descended  4 steps with R rail with Cassi 8 steps with 1 HR with CGA (non-reciprocal pattern) NT 8 steps with 1 rail with SBA 12 steps with 1 rail with Mod I   Curb Step:   ascended and descended TBA NT 2 inch step with LBQC and Min A 7.5 inch step with AAD and SBA 7.5 inch step with AAD and Mod I   Picking up object off the floor TBA NT NT Will  2lb weight with SB assist Will  4lb weight with Mod I   BLE ROM WNL WNL      BLE Strength RLE grossly 5/5  L hip flexion 3+/5  L knee extension 4/5  L ankle DF/PF 4/5 RLE grossly 5/5  L hip flexion 3+/5  L knee extension 4/5  L ankle DF/PF 4/5      Balance  Static and dynamic standing balance Cassi with R LBQC Static and dynamic standing balance CGA with R LBQC      Date Family Teach Completed        Is additional Family Teaching Needed?   Y or N Y Y      Hindering Progress Hemiparesis Hemiparesis      PT recommended ELOS 3 weeks       Team's Discharge Plan        Therapist at Team Meeting          Therapeutic Exercise:   AM: Ambulating 250 feet with VALERIA walker x 1 rep and CGA to assisting in steering VALERIA walker   Weaving around 3 quad canes x 2 reps without AD and CGA  Ambulating 75 feet x 1 rep without AD and CGA  Stepping over  3 quad canes x 2 reps with LBQC and CGA  PM: Ambulating 100 feet without AD x 1 rep and CGA  Step ups onto 4\" step with LBQC with CGA, x 5 reps with LLE leading Side stepping 35 feet to the R and L without UE support x 1 rep and CGA  FW agility ladder in NR pattern with LBQC x 2 reps and CGA           Patient education  Pt educated on safe sequencing with [facility] when stepping over quad canes    Patient response to education:   Pt verbalized understanding Pt demonstrated skill Pt requires further education in this area   yes yes yes     Additional Comments: Pt missed initial 15 minutes of AM session due to restroom use. Patient reports decrease in L foot gout pain as compared previous session. Patient continues to demonstrate consistency with L foot clearance during ambulation however exhibits moderate lateral postural sway when ambulating without AD, no LOB noted. When ambulating with VALERIA walker, patient continues to mildly steer to the right and requires assistance to steer straight forward. Progressed patients step over exercise from stepping over SPC to stepping over quad canes without AD. Patient preformed well with step over exercise with proper foot clearance noted. Plan to continue incorporating functional exercises to promote improvements in reciprocal gait pattern and balance. PM: Continued plan to incorporate functional activities to promote reciprocal pattern. Patient continues to exhibit moderate lateral sway when ambulating without AD. Patients difficulty with L foot clearance is more evident during pivots and turns requiring continous cueing. During step up exercise, patient demonstrated minor left toe catching when stepping down from step however no LOB was noted. Plan to continue mobility training with and without AD. AM  Time in: 0845  Time out: 0915    PM  Time in: 1345  Time out: 1430    Pt is making good progress toward established Physical Therapy goals. Continue with physical therapy current plan of care.     Ira Conley, SPT  Bettie Campbell, PT, DPT  [phone]

## 2021-03-03 LAB
ANION GAP SERPL CALCULATED.3IONS-SCNC: 9 MMOL/L (ref 7–16)
BUN BLDV-MCNC: 18 MG/DL (ref 8–23)
CALCIUM SERPL-MCNC: 9.1 MG/DL (ref 8.6–10.2)
CHLORIDE BLD-SCNC: 106 MMOL/L (ref 98–107)
CO2: 24 MMOL/L (ref 22–29)
CREAT SERPL-MCNC: 1.2 MG/DL (ref 0.7–1.2)
GFR AFRICAN AMERICAN: >60
GFR NON-AFRICAN AMERICAN: >60 ML/MIN/1.73
GLUCOSE BLD-MCNC: 101 MG/DL (ref 74–99)
POTASSIUM SERPL-SCNC: 3.8 MMOL/L (ref 3.5–5)
SODIUM BLD-SCNC: 139 MMOL/L (ref 132–146)
URIC ACID, SERUM: 7.9 MG/DL (ref 3.4–7)

## 2021-03-03 PROCEDURE — 84550 ASSAY OF BLOOD/URIC ACID: CPT

## 2021-03-03 PROCEDURE — 97530 THERAPEUTIC ACTIVITIES: CPT

## 2021-03-03 PROCEDURE — 97112 NEUROMUSCULAR REEDUCATION: CPT | Performed by: OCCUPATIONAL THERAPY ASSISTANT

## 2021-03-03 PROCEDURE — 80048 BASIC METABOLIC PNL TOTAL CA: CPT

## 2021-03-03 PROCEDURE — 6360000002 HC RX W HCPCS: Performed by: INTERNAL MEDICINE

## 2021-03-03 PROCEDURE — 94660 CPAP INITIATION&MGMT: CPT

## 2021-03-03 PROCEDURE — 1280000000 HC REHAB R&B

## 2021-03-03 PROCEDURE — 97129 THER IVNTJ 1ST 15 MIN: CPT

## 2021-03-03 PROCEDURE — 36415 COLL VENOUS BLD VENIPUNCTURE: CPT

## 2021-03-03 PROCEDURE — 6370000000 HC RX 637 (ALT 250 FOR IP): Performed by: INTERNAL MEDICINE

## 2021-03-03 PROCEDURE — 6370000000 HC RX 637 (ALT 250 FOR IP): Performed by: PHYSICAL MEDICINE & REHABILITATION

## 2021-03-03 PROCEDURE — 97110 THERAPEUTIC EXERCISES: CPT

## 2021-03-03 PROCEDURE — 97535 SELF CARE MNGMENT TRAINING: CPT

## 2021-03-03 PROCEDURE — 97130 THER IVNTJ EA ADDL 15 MIN: CPT

## 2021-03-03 RX ORDER — POTASSIUM CHLORIDE 20 MEQ/1
20 TABLET, EXTENDED RELEASE ORAL
Status: DISCONTINUED | OUTPATIENT
Start: 2021-03-03 | End: 2021-03-12 | Stop reason: HOSPADM

## 2021-03-03 RX ADMIN — MESALAMINE 800 MG: 400 CAPSULE, DELAYED RELEASE ORAL at 20:25

## 2021-03-03 RX ADMIN — AMLODIPINE BESYLATE 5 MG: 5 TABLET ORAL at 09:41

## 2021-03-03 RX ADMIN — ENOXAPARIN SODIUM 40 MG: 40 INJECTION SUBCUTANEOUS at 09:42

## 2021-03-03 RX ADMIN — INDOMETHACIN 25 MG: 25 CAPSULE ORAL at 11:31

## 2021-03-03 RX ADMIN — MESALAMINE 800 MG: 400 CAPSULE, DELAYED RELEASE ORAL at 09:41

## 2021-03-03 RX ADMIN — MESALAMINE 800 MG: 400 CAPSULE, DELAYED RELEASE ORAL at 13:01

## 2021-03-03 RX ADMIN — POTASSIUM CHLORIDE 20 MEQ: 1500 TABLET, EXTENDED RELEASE ORAL at 09:42

## 2021-03-03 RX ADMIN — POTASSIUM CHLORIDE 20 MEQ: 1500 TABLET, EXTENDED RELEASE ORAL at 11:32

## 2021-03-03 RX ADMIN — ATORVASTATIN CALCIUM 40 MG: 40 TABLET, FILM COATED ORAL at 20:25

## 2021-03-03 RX ADMIN — INDOMETHACIN 25 MG: 25 CAPSULE ORAL at 16:18

## 2021-03-03 RX ADMIN — LEVOTHYROXINE SODIUM 75 MCG: 0.07 TABLET ORAL at 05:59

## 2021-03-03 RX ADMIN — LOSARTAN POTASSIUM 50 MG: 50 TABLET, FILM COATED ORAL at 09:41

## 2021-03-03 RX ADMIN — CLOPIDOGREL 75 MG: 75 TABLET, FILM COATED ORAL at 09:41

## 2021-03-03 RX ADMIN — POTASSIUM CHLORIDE 20 MEQ: 1500 TABLET, EXTENDED RELEASE ORAL at 16:18

## 2021-03-03 RX ADMIN — INDOMETHACIN 25 MG: 25 CAPSULE ORAL at 09:41

## 2021-03-03 RX ADMIN — METOPROLOL SUCCINATE 25 MG: 25 TABLET, EXTENDED RELEASE ORAL at 09:42

## 2021-03-03 RX ADMIN — SPIRONOLACTONE 50 MG: 25 TABLET ORAL at 09:41

## 2021-03-03 RX ADMIN — SERTRALINE 50 MG: 50 TABLET, FILM COATED ORAL at 09:41

## 2021-03-03 RX ADMIN — ASPIRIN 81 MG: 81 TABLET, CHEWABLE ORAL at 09:42

## 2021-03-03 ASSESSMENT — PAIN SCALES - GENERAL: PAINLEVEL_OUTOF10: 0

## 2021-03-03 NOTE — PROGRESS NOTES
Stand pivot: Cassi with R LBQC Sit to stand: SBA  Stand to sit: SBA  Stand pivot: CGA with R LBQC  Sit to stand: SBA  Stand to sit: SBA  Stand pivot: CGA with R LBQC  SBA Mod I   Ambulation    75 feet with R LBQC with Cassi (WC follow) 180 feet x1 rep and R LBQC with  feet x1 rep and R LBQC with CGA  150 feet with AAD with  feet with AAD with Mod I   Walking 10 feet on uneven surface TBA NT 10 feet with LBQC with CGA 10 feet with AAD with SBA 10 feet with AAD with Mod I   Wheel Chair Mobility NA NT NT     Car Transfers Cassi with R LBQC NT NT SBA Mod I   Stair negotiation: ascended and descended  4 steps with R rail with Cassi 8 steps with 1 HR with CGA (non-reciprocal pattern) NT 8 steps with 1 rail with SBA 12 steps with 1 rail with Mod I   Curb Step:   ascended and descended TBA NT N/T 7.5 inch step with AAD and SBA 7.5 inch step with AAD and Mod I   Picking up object off the floor TBA NT NT Will  2lb weight with SB assist Will  4lb weight with Mod I   BLE ROM WNL WNL      BLE Strength RLE grossly 5/5  L hip flexion 3+/5  L knee extension 4/5  L ankle DF/PF 4/5 RLE grossly 5/5  L hip flexion 3+/5  L knee extension 4/5  L ankle DF/PF 4/5      Balance  Static and dynamic standing balance Cassi with R LBQC Static and dynamic standing balance CGA with R LBQC      Date Family Teach Completed        Is additional Family Teaching Needed? Y or N Y Y      Hindering Progress Hemiparesis Hemiparesis      PT recommended ELOS 3 weeks       Team's Discharge Plan        Therapist at Team Meeting          Therapeutic Exercise:   AM:   Weaving around 4 quad canes x 2 reps with AD and CGA  Ambulating 30 feet x 2 rep without AD and CGA  Stepping over  4 quad canes x 2 reps with parallel bars and CGA  Sidestepping 10 feet x2 reps , step over quad canes in parallel bars x2 reps.   PM: Ambulating 100 feet without AD x 1 rep and CGA  Step ups onto 6\" step with LBQC with CGA, x 10 reps with LLE leading Dorsiflexion B LEs with purple t band  Toe raises 2x10. Patient education  Pt educated on safe sequencing with [facility] when stepping over quad canes    Patient response to education:   Pt verbalized understanding Pt demonstrated skill Pt requires further education in this area   yes yes yes     Additional Comments: Pt continues to do well. Occ cues to improve clearance of L foot still. Mild pain due to gout on L LE. Challenging pt's balance by pt amb without A.D. Pt returned to room at end of tx sessions. AM  Time in: 0830  Time out: 0915    PM  Time in: 1345  Time out: 1430    Pt is making good progress toward established Physical Therapy goals. Continue with physical therapy current plan of care.     Delbert Ram [id number]

## 2021-03-03 NOTE — PROGRESS NOTES
Progress Note  Date:3/3/2021       Room:5501/5501-B  Patient Dominique Meza     YOB: 1955     Age:65 y.o. Subjective    Subjective:  Symptoms:  Stable. He reports weakness. Diet:  Adequate intake. Activity level: Impaired due to weakness. Pain:  He complains of pain that is mild. Review of Systems   Neurological: Positive for weakness. Objective         Vitals Last 24 Hours:  TEMPERATURE:  Temp  Av.6 °F (37 °C)  Min: 98.6 °F (37 °C)  Max: 98.6 °F (37 °C)  RESPIRATIONS RANGE: Resp  Av  Min: 18  Max: 18  PULSE OXIMETRY RANGE: SpO2  Av %  Min: 96 %  Max: 96 %  PULSE RANGE: Pulse  Av  Min: 74  Max: 74  BLOOD PRESSURE RANGE: Systolic (08EEP), CRT:555 , Min:137 , LOW:946   ; Diastolic (31BHB), FNH:63, Min:84, Max:84    I/O (24Hr): Intake/Output Summary (Last 24 hours) at 3/3/2021 0745  Last data filed at 3/2/2021 1900  Gross per 24 hour   Intake 720 ml   Output    Net 720 ml     Objective:  General Appearance: In no acute distress. Vital signs: (most recent): Blood pressure 137/84, pulse 74, temperature 98.6 °F (37 °C), temperature source Temporal, resp. rate 18, height 5' 9\" (1.753 m), weight (!) 307 lb 4 oz (139.4 kg), SpO2 96 %. Vital signs are normal.    Output: Producing urine and producing stool. Lungs:  Normal effort and normal respiratory rate. Breath sounds clear to auscultation. Heart: Normal rate. Regular rhythm. S1 normal and S2 normal.    Abdomen: Abdomen is soft. Bowel sounds are normal.   There is no abdominal tenderness. Extremities: Normal range of motion. Neurological: Patient is alert. (4/5 strength). Labs/Imaging/Diagnostics    Labs:  CBC:No results for input(s): WBC, RBC, HGB, HCT, MCV, RDW, PLT in the last 72 hours.   CHEMISTRIES:  Recent Labs     21  0409 21  0400    139   K 3.6 3.8    106   CO2 25 24   BUN 22 18   CREATININE 1.2 1.2   GLUCOSE 103* 101* PT/INR:No results for input(s): PROTIME, INR in the last 72 hours. APTT:No results for input(s): APTT in the last 72 hours. LIVER PROFILE:No results for input(s): AST, ALT, BILIDIR, BILITOT, ALKPHOS in the last 72 hours. Imaging Last 24 Hours:  No results found. Assessment//Plan           Hospital Problems           Last Modified POA    Acute CVA (cerebrovascular accident) ([facility] [address] 2/23/2021 Yes      Assessment:      Date   Status   AE    Comments     Feeding   3/2/21   SBA       Pt able to reach for cup of water and drink using L hand     Grooming   3/1/21   Min A                   Oral Care   3/1/21   Supervision             Bathing   3/1/21   UB: Min A    LB:Mod A             UB Dressing   3/1/21   Min A             LB Dressing   3/1/21   Mod A   No AE         Footwear   3/1/21   Min A    adapted shoe laces         Toileting   3/1/21   Mod A             Homemaking                          Functional Transfers / Balance:      Date Status DME  Comments   Sit Balance 3/2/21   Stand by Assist        Stand Balance 3/2/21   Minimal Assist        [x]? Tub           [x]? Shower   Transfer 3/1/21             3/1/21 Min A           Min A LBQC, large shower chair     Commode   Transfer 3/1/21   [facility]     Functional   Mobility 3/1/21   Min A LBQC     Other:                Assessment:    Condition: In stable condition. Improving.   (CVA). Plan:   Encourage ambulation. (Tolerating therapy well  Gout symptoms have improved  We will continue on Indocin  Labs are stable  I will decrease the potassium supplement  Working on balance and coordination).        Electronically signed by Olga Rivera MD on 3/3/21 at 7:45 AM EST

## 2021-03-03 NOTE — PLAN OF CARE
Problem: Falls - Risk of:  Goal: Will remain free from falls  Description: Will remain free from falls  Outcome: Met This Shift  Goal: Absence of physical injury  Description: Absence of physical injury  Outcome: Met This Shift     Problem: Skin Integrity:  Goal: Will show no infection signs and symptoms  Description: Will show no infection signs and symptoms  Outcome: Met This Shift  Goal: Absence of new skin breakdown  Description: Absence of new skin breakdown  Outcome: Met This Shift     Problem: Neurological  Goal: Maximum potential motor/sensory/cognitive function  Outcome: Met This Shift

## 2021-03-03 NOTE — PROGRESS NOTES
Speech Language Pathology  ACUTE REHABILITATION--DAILY PROGRESS NOTE     ADMITTING DIAGNOSIS: Acute CVA (cerebrovascular accident) ([facility] [address] [I63.9]     SPEECH PATHOLOGY DIAGNOSIS:    Mild cognitive-linguistic deficits     THERAPY RECOMMENDATIONS:   Speech Pathology intervention is recommended 3-6 times per week for LOS or when goals are met with emphasis on the following:      Immediate/ STM for functional information to complete tasks as instructed and recall pertinent medical information with minimal and/or use of external memory aide training.   Problem solving/ insight/ judgement for various ADL/iADL tasks, including but not limited to: medication management and money/ finance management, overall safety awareness in the PLOF with 80% accuracy                                                                                                                                        FIMS SCORES                            Swallowing                          Current Status             7--Independent                         Short Term Goal         7--Independent                         Long Term Goal          7--Independent                Receptive                          Current Status             7--Independent                         Short Term Goal         7--Independent                         Long Term Goal          7--Independent                Expressive                          Current Status             7--Independent                         Short Term Goal         7--Independent                         Long Term Goal          7--Independent                Social Interaction                          Current Status             7--Independent                         Short Term Goal         7--Independent                         Long Term Goal          7--Independent                                                               Problem Solving Current Status             5--Supervision               Short Term Goal         6--Modified Independent                       Long Term Goal          6--Modified Independent                          Memory                          Current Status             5--Supervision               Short Term Goal         6--Modified Independent                       Long Term Goal          6--Modified Independent                              SWALLOWING:      Diet:  Regular consistency solids with thin liquids     Patient does not require supervision during meals. LANGUAGE:     WFL     COGNITION:       Patient completed cognitive activity on a iPad with a focus on inferencing and recall. Patient was 90% accurate answering inferential questions following short voicemail messages. Patient benefited from having voicemail repeated x1 on one occasion. Patient completed a recreational cognitive activity with a focus on sequencing, recall, and strategy. Patient had fair+ outcome, benefiting from initial minimal cues to learn rules. Patient showed good recall of rules and instructions, and identified opportunities to advance in game with increased independence as game progressed. Patient completed cognitive activity on a iPad with a focus on clock-math. Patient was 100% accurate with slightly increased time for processing when calculating complex clock-math problems (e.g. a picture of a clock face is presented with the time at 6:53 with a question asking what the time was 1 hour and 32 minutes ago). Patient reports he feels that his current cognitive function matches that of before his stroke. Will continue to assess; anticipate discharge from SP intervention soon. Safety:  fair +    SPEECH:     WFL    SP recommended after discharge:  no  Supervision recommended at discharge: Intermittent    Will continue SP intervention as per previously established POC.     Minute Tracking:

## 2021-03-03 NOTE — PROGRESS NOTES
Pt sitting in chair upon arrival to OT gym during 2nd session. CGA for functional mobility from w/c to mat table. SBA for sit-supine. Pt completed AROM, 3x15 reps (elbow/shoulder flex/ext/add/abduction) of LUE to increase ROM and maintain joint integrity. Pt appeared to reach full AROM while supine. Participated in reaching on mat table for plastic rings and placing  On various height levels on wooden stand using LUE to increase strength/ROM/coordination. Pt able to cross midline and reach ow/high surface to complete task. Pt engaged in velcro ball catching/tossing to increase LUE reflexes, ROM, strength,coordination. Pt demo'ing F- coordination due to dropping ball intermittently. Sensory / Neuromuscular Re-Education:  Pt. Performed in motion robotic therapy to increase AROM, GMC and strengh of LUE to increase independence with ADL's. Pt. Completed 272 movements including pre and post testing plus 80 secs of stabilization 100. Pt. Improved from pre to post testing in motion smoothness from 0.323 to 0.337, reach error from 0.012 to 0.010 and max velocity from 0.163 to 0.192. Pt. Appreciated the feedback the robot provides. Cognitive Skills:   Status Comments   Problem   Solving fair     Memory fair     Sequencing fair     Safety fair       Visual Perception:    Education:  AE management during LE dressing  3/1/21: shoe laces adapted for ease with donning shoes    [] Family teach completed on:    Pain Level: 0/10    Additional Notes:   3/2/21- Pt plan of care of updated on this date. Pt tentative length of stay is 2 weeks. Recommend family teach this week. Patient has made good  progress during treatment sessions toward set goals. Therapy emphasis to obtain goals:Strengthening, Endurance Training, Neuromuscular Re-education, Patient/Caregiver Education & Training, ROM, Cognitive Reorientation, Equipment Evaluation, Education, & procurement, Self-Care / ADL, Balance Training, Gait Training, Home Management Training, Functional Mobility Training, Safety Education & Training, Positioning     [x] Continue with current OT Plan of care.   [] Prepare for Discharge     DISCHARGE RECOMMENDATIONS  Recommended DME: extended tub bench    Post Discharge Care:   []Home Independently  []Home with 24hr Care / Supervision []Home with Partial Supervision []Home with Home Health OT []Home with Out Pt OT []Other: ___   Comments:         Time in Time out Tx Time Breakdown  Variance:   First Session  0700 0745 [x] Individual Tx-45  [] Concurrent Tx -   [] Co-Tx -   [] Group Tx -   [] Time Missed -     Second Session [id number] [] Individual Tx-   [x] Concurrent Tx -45  [] Co-Tx -   [] Group Tx -   [] Time Missed -     Third Session  1500 1545 [x] Individual Tx-45 mins  [] Concurrent Tx -  [] Co-Tx -   [] Group Tx -   [] Time Missed -         Total Tx Time: 135 mins     Slava Olsen, OTR/L [id number] [facility] [place]/L [address]

## 2021-03-04 PROCEDURE — 97130 THER IVNTJ EA ADDL 15 MIN: CPT

## 2021-03-04 PROCEDURE — 6370000000 HC RX 637 (ALT 250 FOR IP): Performed by: INTERNAL MEDICINE

## 2021-03-04 PROCEDURE — 94660 CPAP INITIATION&MGMT: CPT

## 2021-03-04 PROCEDURE — 1280000000 HC REHAB R&B

## 2021-03-04 PROCEDURE — 97112 NEUROMUSCULAR REEDUCATION: CPT

## 2021-03-04 PROCEDURE — 6370000000 HC RX 637 (ALT 250 FOR IP): Performed by: PHYSICAL MEDICINE & REHABILITATION

## 2021-03-04 PROCEDURE — 6360000002 HC RX W HCPCS: Performed by: INTERNAL MEDICINE

## 2021-03-04 PROCEDURE — 97129 THER IVNTJ 1ST 15 MIN: CPT

## 2021-03-04 PROCEDURE — 97530 THERAPEUTIC ACTIVITIES: CPT

## 2021-03-04 PROCEDURE — 97110 THERAPEUTIC EXERCISES: CPT

## 2021-03-04 RX ORDER — AMLODIPINE BESYLATE 2.5 MG/1
2.5 TABLET ORAL DAILY
Status: DISCONTINUED | OUTPATIENT
Start: 2021-03-05 | End: 2021-03-05

## 2021-03-04 RX ADMIN — ATORVASTATIN CALCIUM 40 MG: 40 TABLET, FILM COATED ORAL at 19:53

## 2021-03-04 RX ADMIN — SERTRALINE 50 MG: 50 TABLET, FILM COATED ORAL at 07:44

## 2021-03-04 RX ADMIN — ENOXAPARIN SODIUM 40 MG: 40 INJECTION SUBCUTANEOUS at 07:44

## 2021-03-04 RX ADMIN — LEVOTHYROXINE SODIUM 75 MCG: 0.07 TABLET ORAL at 06:12

## 2021-03-04 RX ADMIN — ASPIRIN 81 MG: 81 TABLET, CHEWABLE ORAL at 07:43

## 2021-03-04 RX ADMIN — MESALAMINE 800 MG: 400 CAPSULE, DELAYED RELEASE ORAL at 19:53

## 2021-03-04 RX ADMIN — INDOMETHACIN 25 MG: 25 CAPSULE ORAL at 11:32

## 2021-03-04 RX ADMIN — INDOMETHACIN 25 MG: 25 CAPSULE ORAL at 07:43

## 2021-03-04 RX ADMIN — POTASSIUM CHLORIDE 20 MEQ: 1500 TABLET, EXTENDED RELEASE ORAL at 17:17

## 2021-03-04 RX ADMIN — SPIRONOLACTONE 50 MG: 25 TABLET ORAL at 07:44

## 2021-03-04 RX ADMIN — MESALAMINE 800 MG: 400 CAPSULE, DELAYED RELEASE ORAL at 07:44

## 2021-03-04 RX ADMIN — AMLODIPINE BESYLATE 5 MG: 5 TABLET ORAL at 07:43

## 2021-03-04 RX ADMIN — LOSARTAN POTASSIUM 50 MG: 50 TABLET, FILM COATED ORAL at 07:43

## 2021-03-04 RX ADMIN — METOPROLOL SUCCINATE 25 MG: 25 TABLET, EXTENDED RELEASE ORAL at 07:43

## 2021-03-04 RX ADMIN — MESALAMINE 800 MG: 400 CAPSULE, DELAYED RELEASE ORAL at 13:31

## 2021-03-04 RX ADMIN — INDOMETHACIN 25 MG: 25 CAPSULE ORAL at 17:16

## 2021-03-04 RX ADMIN — CLOPIDOGREL 75 MG: 75 TABLET, FILM COATED ORAL at 07:43

## 2021-03-04 RX ADMIN — POTASSIUM CHLORIDE 20 MEQ: 1500 TABLET, EXTENDED RELEASE ORAL at 07:43

## 2021-03-04 RX ADMIN — POTASSIUM CHLORIDE 20 MEQ: 1500 TABLET, EXTENDED RELEASE ORAL at 11:32

## 2021-03-04 ASSESSMENT — PAIN SCALES - GENERAL
PAINLEVEL_OUTOF10: 0

## 2021-03-04 ASSESSMENT — PAIN DESCRIPTION - LOCATION: LOCATION: TOE (COMMENT WHICH ONE)

## 2021-03-04 NOTE — PROGRESS NOTES
Progress Note  Date:3/4/2021       Room:550550-  Patient Galen Laura     YOB: 1955     Age:65 y.o. Subjective    Subjective:  Symptoms:  Stable. He reports weakness. Diet:  Adequate intake. Activity level: Impaired due to weakness. Pain:  He reports no pain. Review of Systems   Neurological: Positive for weakness. Objective         Vitals Last 24 Hours:  TEMPERATURE:  Temp  Av.8 °F (36.6 °C)  Min: 97.8 °F (36.6 °C)  Max: 97.8 °F (36.6 °C)  RESPIRATIONS RANGE: Resp  Av.3  Min: 16  Max: 18  PULSE OXIMETRY RANGE: SpO2  Av %  Min: 96 %  Max: 96 %  PULSE RANGE: Pulse  Av.3  Min: 64  Max: 78  BLOOD PRESSURE RANGE: Systolic (92DRM), SDF:017 , Min:119 , NYV:333   ; Diastolic (28ZPQ), ILB:80, Min:78, Max:80    I/O (24Hr): Intake/Output Summary (Last 24 hours) at 3/4/2021 0849  Last data filed at 3/3/2021 1900  Gross per 24 hour   Intake 480 ml   Output    Net 480 ml     Objective:  General Appearance: In no acute distress. Vital signs: (most recent): Blood pressure 119/78, pulse 78, temperature 97.8 °F (36.6 °C), temperature source Temporal, resp. rate 18, height 5' 9\" (1.753 m), weight (!) 307 lb 4 oz (139.4 kg), SpO2 96 %. Vital signs are normal.    Output: Producing urine and producing stool. Lungs:  Normal effort and normal respiratory rate. Breath sounds clear to auscultation. Heart: Normal rate. Regular rhythm. S1 normal and S2 normal.    Abdomen: Abdomen is soft. Bowel sounds are normal.   There is no abdominal tenderness. Extremities: Normal range of motion. Neurological: Patient is alert. (4/5 strength). Labs/Imaging/Diagnostics    Labs:  CBC:No results for input(s): WBC, RBC, HGB, HCT, MCV, RDW, PLT in the last 72 hours. CHEMISTRIES:  Recent Labs     21  0400      K 3.8      CO2 24   BUN 18   CREATININE 1.2   GLUCOSE 101*     PT/INR:No results for input(s): PROTIME, INR in the last 72 hours. APTT:No results for input(s): APTT in the last 72 hours. LIVER PROFILE:No results for input(s): AST, ALT, BILIDIR, BILITOT, ALKPHOS in the last 72 hours. Imaging Last 24 Hours:  No results found. Assessment//Plan           Hospital Problems           Last Modified POA    Acute CVA (cerebrovascular accident) ([facility] [address] 2/23/2021 Yes        Assessment:    Condition: In stable condition. Improving.   (CVA). Plan:   Encourage ambulation. (Tolerating therapy well  Gout is improved  Blood pressure is running a little bit low  Going to decrease the Norvasc dose  We will complete the Indocin soon).        Electronically signed by Velma Rbob MD on 3/4/21 at 8:49 AM EST

## 2021-03-04 NOTE — PROGRESS NOTES
OCCUPATIONAL THERAPY DAILY NOTE    Date:3/4/2021  Patient Name: Yasmine Price  MRN: [id number]  : 1955  Room: [address] [place] [address]   Referring Practitioner: Valentine Morris MD  Diagnosis: CVA- RMCA  Additional Pertinent Hx: HLD, HTN, obese & hypothyroidism    Precautions: Fall Risk, L hemiparesis    Functional Assessment:   Date Status AE  Comments   Feeding 3/3/21 SBA     Grooming 3/3/21 Min A           Oral Care 3/3/21 Supervision     Bathing 3/3/21 UB: SBA  LB:Min A     UB Dressing 3/3/21 CGA     LB Dressing 3/3/21 Min A No AE    Footwear 3/3/21 SBA  adapted shoe laces    Toileting 3/3/21 Min A     Homemaking         Functional Transfers / Balance:   Date Status DME  Comments   Sit Balance 3/4/21 Stand by Assist      Stand Balance 3/4/21 CGA      [x] Tub        [x] Shower   Transfer 3/1/21        3/3/21 Min A        CGA LBQC, large shower chair    Commode   Transfer 3/3/21 [facility]    Functional   Mobility 3/3/21 CGA LBQC    Other:         Functional Exercises / Activity:  Pt sitting in chair upon arrival to OT gym in AM. Engaged in therex of towel slides while stand at table increase BUE ROM and standing balance/tolerance, 3x15 reps shoulder flex/madeline/abduction. Pt able to stand at SBA, 3x5 mins. Participated in armbike exercise, seated, 2x6 mins, to increase endurance for ease with ADLs. Engaged in therex using medium resistance metal gripper, 3x15 reps, to increase L hand strength for ease with ADLs. Engaged in therex by flattening blue resistance putty using wheel to increase BUE strength, posture, and core strength for ease with functional transfers. Engaged in [employer] using 2# wrist weight, 3x3 up/downs to increase BUE strength for ease with functional transfers. Completed dynamometer and 9 hole peg tests, see below. Pt appeared to have tolerated session well.     Sensory / Neuromuscular Re-Education:      Cognitive Skills:   Status Comments   Problem   Solving fair     Memory fair     Sequencing fair Safety fair       Visual Perception:    Education:  AE management during LE dressing  3/1/21: shoe laces adapted for ease with donning shoes    [] Family teach completed on:    Pain Level: 0/10    Additional Notes:   3/2/21- Pt plan of care of updated on this date. Pt tentative length of stay is 2 weeks. Recommend family teach this week. 2/24/21:    Dynamometer:    R:105# & norm for pt age & gender is 89#    L:38# & norm for pt age & gender is 77#   9 hole peg:    R:36.7 seconds & norm for pt age & gender is 20.9 seconds    L: pt u/a- norm for pt age & gender is 21.6 seconds    3/4/21:    Dynamometer:    R:107.3#    L: 69.7#   9 hole peg:    R: 28.65 secs    L:3 mins 39 secs    Patient has made good  progress during treatment sessions toward set goals. Therapy emphasis to obtain goals:Strengthening, Endurance Training, Neuromuscular Re-education, Patient/Caregiver Education & Training, ROM, Cognitive Reorientation, Equipment Evaluation, Education, & procurement, Self-Care / ADL, Balance Training, Gait Training, Home Management Training, Functional Mobility Training, Safety Education & Training, Positioning     [x] Continue with current OT Plan of care.   [] Prepare for Discharge     DISCHARGE RECOMMENDATIONS  Recommended DME: extended tub bench    Post Discharge Care:   []Home Independently  []Home with 24hr Care / Supervision []Home with Partial Supervision []Home with Home Health OT []Home with Out Pt OT []Other: ___   Comments:         Time in Time out Tx Time Breakdown  Variance:   First Session  1978 9041 [x] Individual Tx-15  [x] Concurrent Tx - 30  [] Co-Tx -   [] Group Tx -   [] Time Missed -     Second Session 9120 [id number] [] Individual Tx-   [x] Concurrent Tx -45  [] Co-Tx -   [] Group Tx -   [] Time Missed -     Third Session    [] Individual Tx-  [] Concurrent Tx -  [] Co-Tx -   [] Group Tx -   [] Time Missed -         Total Tx Time: [address], OTR/L [id number]

## 2021-03-04 NOTE — PROGRESS NOTES
Speech Language Pathology  ACUTE REHABILITATION  DAILY PROGRESS NOTE / DISCHARGE SUMMARY     ADMITTING DIAGNOSIS: Acute CVA (cerebrovascular accident) ([facility] [address] [I63.9]     SPEECH PATHOLOGY DIAGNOSIS:    Mild cognitive-linguistic deficits     THERAPY RECOMMENDATIONS:   Speech Pathology intervention is recommended 3-6 times per week for LOS or when goals are met with emphasis on the following:      Immediate/ STM for functional information to complete tasks as instructed and recall pertinent medical information with minimal and/or use of external memory aide training.   Problem solving/ insight/ judgement for various ADL/iADL tasks, including but not limited to: medication management and money/ finance management, overall safety awareness in the PLOF with 80% accuracy                                                                                                                                        FIMS SCORES                            Swallowing                          Current Status             7--Independent                         Short Term Goal         7--Independent                         Long Term Goal          7--Independent                Receptive                          Current Status             7--Independent                         Short Term Goal         7--Independent                         Long Term Goal          7--Independent                Expressive                          Current Status             7--Independent                         Short Term Goal         7--Independent                         Long Term Goal          7--Independent                Social Interaction                          Current Status             7--Independent                         Short Term Goal         7--Independent                         Long Term Goal          7--Independent                                                               Problem Solving                          Current Status verbal response. OUTCOME:      Patient met all goals. Discharge from [facility] intervention recommended. PT/OT to reinforce safety strategies with functionally based motor tasks as needed within their respective venues.     SP recommended after discharge:  No  Supervision recommended at discharge: Intermittent

## 2021-03-04 NOTE — PROGRESS NOTES
Stand pivot: CGA with R LBQC  SBA Mod I   Ambulation    75 feet with R LBQC with Cassi (WC follow) 200 feet x2 with  R LBQC with CG/ feet x1 rep and R LBQC with CGA/SBA  150 feet with AAD with  feet with AAD with Mod I   Walking 10 feet on uneven surface TBA NT NT 10 feet with AAD with SBA 10 feet with AAD with Mod I   Wheel Chair Mobility NA NT NT     Car Transfers Cassi with R LBQC Min/cga NT SBA Mod I   Stair negotiation: ascended and descended  4 steps with R rail with Csasi 12 steps with unilateral rail with CGA  (non-reciprocal pattern) NT 8 steps with 1 rail with SBA 12 steps with 1 rail with Mod I   Curb Step:   ascended and descended TBA NT N/T 7.5 inch step with AAD and SBA 7.5 inch step with AAD and Mod I   Picking up object off the floor TBA NT NT Will  2lb weight with SB assist Will  4lb weight with Mod I   BLE ROM WNL WNL      BLE Strength RLE grossly 5/5  L hip flexion 3+/5  L knee extension 4/5  L ankle DF/PF 4/5 RLE grossly 5/5  L hip flexion 3+/5  L knee extension 4/5  L ankle DF/PF 4/5      Balance  Static and dynamic standing balance Cassi with R LBQC Static and dynamic standing balance CGA with R LBQC      Date Family Teach Completed        Is additional Family Teaching Needed?   Y or N Y Y      Hindering Progress Hemiparesis Hemiparesis      PT recommended ELOS 3 weeks       Team's Discharge Plan        Therapist at Team Meeting          Therapeutic Exercise:   AM:   Stepping over quad canes using [facility] for support ( CG/SBA)   Side stepping over quad canes using LBQC for support ( min/cga)   PM:  Stepping over 4 quad canes with R LBQC and LLE leading x 2 reps and CGA/SBA  Ambulating 75 feet and weaving between colored floor tiles without AD x 1 rep and CGA/SBA  Standing on Air Ex and grabbing cone with LUE from left side and stacking them on mat table on right side, 2 sets x6 reps with CGA Ambulating without AD and picking up ankle weights with LUE then stepping up on 4\" step and placing ankle weight onto mat table, x5 reps with Min A        Patient education  Pt educated on slowing down with ambulation to improve safety and balance,     Patient response to education:   Pt verbalized understanding Pt demonstrated skill Pt requires further education in this area   x X with verbal cues  x     Additional Comments: AM: Pt completed functional mobility as noted above. Pt required CGA occasionally with ambulation due to increased napoleon with unsteadiness noted. Pt had a LOB x1 with min A to correct. Verbal cues for proper sequencing with stair negotiation. PM: Patient seated in WC upon room entry. Patient continues to demonstrates improvements in LUE which was noted during cone stacking exercise. Patient exhibited left toe catch when stepping down from 4\" step resulting in 1 LOB posteriorly however patient able to self correct without hands-on assistance. Plan to continue incorporating therapeutic exercise to promote improvements in LUE mobility and dynamic standing balance. AM  Time in: 0830  Time out: 0915    PM  Time in: 1345  Time out: 1430    Pt is making good progress toward established Physical Therapy goals. Continue with physical therapy current plan of care.     Lucina Mclean [id number] ( AM)     Walt Marin, SPT (PM)  Gisella Arias, PT, DPT  [phone]

## 2021-03-05 LAB
ANION GAP SERPL CALCULATED.3IONS-SCNC: 8 MMOL/L (ref 7–16)
BUN BLDV-MCNC: 17 MG/DL (ref 8–23)
CALCIUM SERPL-MCNC: 8.6 MG/DL (ref 8.6–10.2)
CHLORIDE BLD-SCNC: 103 MMOL/L (ref 98–107)
CO2: 26 MMOL/L (ref 22–29)
CREAT SERPL-MCNC: 1.2 MG/DL (ref 0.7–1.2)
GFR AFRICAN AMERICAN: >60
GFR NON-AFRICAN AMERICAN: >60 ML/MIN/1.73
GLUCOSE BLD-MCNC: 103 MG/DL (ref 74–99)
POTASSIUM SERPL-SCNC: 3.5 MMOL/L (ref 3.5–5)
SODIUM BLD-SCNC: 137 MMOL/L (ref 132–146)

## 2021-03-05 PROCEDURE — 6370000000 HC RX 637 (ALT 250 FOR IP): Performed by: INTERNAL MEDICINE

## 2021-03-05 PROCEDURE — 97535 SELF CARE MNGMENT TRAINING: CPT

## 2021-03-05 PROCEDURE — 94660 CPAP INITIATION&MGMT: CPT

## 2021-03-05 PROCEDURE — 1280000000 HC REHAB R&B

## 2021-03-05 PROCEDURE — 6360000002 HC RX W HCPCS: Performed by: INTERNAL MEDICINE

## 2021-03-05 PROCEDURE — 6370000000 HC RX 637 (ALT 250 FOR IP): Performed by: PHYSICAL MEDICINE & REHABILITATION

## 2021-03-05 PROCEDURE — 97110 THERAPEUTIC EXERCISES: CPT

## 2021-03-05 PROCEDURE — 80048 BASIC METABOLIC PNL TOTAL CA: CPT

## 2021-03-05 PROCEDURE — 97530 THERAPEUTIC ACTIVITIES: CPT

## 2021-03-05 PROCEDURE — 36415 COLL VENOUS BLD VENIPUNCTURE: CPT

## 2021-03-05 RX ORDER — SODIUM CHLORIDE 0.9 % (FLUSH) 0.9 %
SYRINGE (ML) INJECTION
Status: DISPENSED
Start: 2021-03-05 | End: 2021-03-06

## 2021-03-05 RX ORDER — INDOMETHACIN 25 MG/1
25 CAPSULE ORAL DAILY
Status: COMPLETED | OUTPATIENT
Start: 2021-03-05 | End: 2021-03-07

## 2021-03-05 RX ADMIN — POTASSIUM CHLORIDE 20 MEQ: 1500 TABLET, EXTENDED RELEASE ORAL at 12:29

## 2021-03-05 RX ADMIN — LEVOTHYROXINE SODIUM 75 MCG: 0.07 TABLET ORAL at 06:00

## 2021-03-05 RX ADMIN — INDOMETHACIN 25 MG: 25 CAPSULE ORAL at 08:27

## 2021-03-05 RX ADMIN — LOSARTAN POTASSIUM 50 MG: 50 TABLET, FILM COATED ORAL at 08:27

## 2021-03-05 RX ADMIN — SPIRONOLACTONE 50 MG: 25 TABLET ORAL at 08:27

## 2021-03-05 RX ADMIN — POTASSIUM CHLORIDE 20 MEQ: 1500 TABLET, EXTENDED RELEASE ORAL at 08:27

## 2021-03-05 RX ADMIN — ENOXAPARIN SODIUM 40 MG: 40 INJECTION SUBCUTANEOUS at 08:28

## 2021-03-05 RX ADMIN — POTASSIUM CHLORIDE 20 MEQ: 1500 TABLET, EXTENDED RELEASE ORAL at 16:53

## 2021-03-05 RX ADMIN — ASPIRIN 81 MG: 81 TABLET, CHEWABLE ORAL at 08:28

## 2021-03-05 RX ADMIN — MESALAMINE 800 MG: 400 CAPSULE, DELAYED RELEASE ORAL at 08:27

## 2021-03-05 RX ADMIN — CLOPIDOGREL 75 MG: 75 TABLET, FILM COATED ORAL at 08:27

## 2021-03-05 RX ADMIN — ATORVASTATIN CALCIUM 40 MG: 40 TABLET, FILM COATED ORAL at 20:59

## 2021-03-05 RX ADMIN — MESALAMINE 800 MG: 400 CAPSULE, DELAYED RELEASE ORAL at 20:59

## 2021-03-05 RX ADMIN — SERTRALINE 50 MG: 50 TABLET, FILM COATED ORAL at 08:27

## 2021-03-05 RX ADMIN — MESALAMINE 800 MG: 400 CAPSULE, DELAYED RELEASE ORAL at 13:33

## 2021-03-05 RX ADMIN — METOPROLOL SUCCINATE 25 MG: 25 TABLET, EXTENDED RELEASE ORAL at 08:36

## 2021-03-05 ASSESSMENT — PAIN SCALES - GENERAL: PAINLEVEL_OUTOF10: 0

## 2021-03-05 NOTE — PROGRESS NOTES
OCCUPATIONAL THERAPY DAILY NOTE    Date:3/5/2021  Patient Name: Que Burk  MRN: [id number]  : 1955  Room: [address] [place] [address]   Referring Practitioner: Adry Walton MD  Diagnosis: CVA- RMCA  Additional Pertinent Hx: HLD, HTN, obese & hypothyroidism    Precautions: Fall Risk, L hemiparesis    Functional Assessment:   Date Status AE  Comments   Feeding 3/3/21 SBA     Grooming 3/3/21 Min A           Oral Care 3/3/21 Supervision     Bathing 3/3/21 UB: SBA  LB:Min A     UB Dressing 3/3/21 CGA     LB Dressing 3/3/21 Min A No AE    Footwear 3/3/21 SBA  adapted shoe laces    Toileting 3/3/21 Min A     Homemaking 3/5/21 CGA/Min A LBQC Pt stood to wash cooking utensils at kitchen sink needing occ cues for standing balance and LUE wt bearing. Pt wiped off countertops alternating RUE/LUE's needing assistance for standing balance and safety when side stepping using countertop/quad cane. Functional Transfers / Balance:   Date Status DME  Comments   Sit Balance 3/5/21 Stand by Assist   Sitting balance up in w/c during table top velcro paddle ball activity and arm ex's in afternoon session. Sitting balance up in w/c in am during BUE pulley ex's to increase overall strength and endurance for ADL's, transfers and mobility skills. Stand Balance 3/5/21 CGA/min A LBQC  countertop Standing balance during ambulation and hmkg tasks to increase static and dyn skills using quad cane or counter top for balance. [x] Tub        [x] Shower   Transfer 3/1/21        3/3/21 Min A        CGA LBQC, large shower chair    Commode   Transfer 3/3/21 Min A LBQC    Functional   Mobility 3/5/21 CGA LBQC Pt ambulated around in room using quad cane then in OT kitchen to increase endurance & strength. Other: sit to stand 3/5/21 CGA LBQC Sit  To stand transfers from w/c <>quad cane      Functional Exercises / Activity:  Pt completed fxl mobility in room using quad cane for balance needing CGA for safety.     Pt engaged in dyn/static standing during kitchen tasks to wash/rinse utensil in kitchen sink then side stepping during countertop wiping activity using quad cane and counter top for balance. BUE AROM ex's performed with counter wiping and LUE wt bearing while standing at sink and countertops to improve LUE ROM at all joints. LUE AROM/AAROM ex's for shld punches, flex/ext, elbow curls, pronation/supination and hand pumping to increase ROM and functional use for activities/ADL's. BUE AROM ex's using pulleys to increase scapula/shld ROM along with left handed  while seated in w/c tolerating 3 reps of 15 ea. AM: NO Family teach with OT since wife already left after PT session at 8:30am and will need rescheduled and OTR/L notified. PM: BUE AROM's performed during velcro paddle ball rolling, tossing and catching while wearing 1# wt on RUE to improve strength & endurance for ADL',s and transfers along with Left handed grasp/release skills. LUE AROM Ex's sliding paddle up/down incline wedge, R/L across and circles to increase shld/scapula,elbow, wrist and finger ROM. Pt needed occ cues and assistance for proper follow thru with arm ex's especially LUE elbow & wrist along with posture into neutral versus slight leaning towards R side. Sensory / Neuromuscular Re-Education:      Cognitive Skills:   Status Comments   Problem   Solving fair  Demo during hmkg/standing balance and fxl mobility   Memory fair  \"   Sequencing fair  :\"   Safety fair  \"     Visual Perception:    Education:  Pt educated with LUE positioning with L arm rest turned to promote wt. Bearing on arm rest.   Pt educated with LUE ex's including shld, elbow, wrist and hand pumps along with grasp/release to increase overall ROM for functional use. 3/1/21: shoe laces adapted for ease with donning shoes    [] Family teach completed on:    Pain Level: 0/10    Additional Notes:   3/2/21- Pt plan of care of updated on this date. Pt tentative length of stay is 2 weeks.  Recommend family teach this week. 2/24/21:    Dynamometer:    R:105# & norm for pt age & gender is 89#    L:38# & norm for pt age & gender is 77#   9 hole peg:    R:36.7 seconds & norm for pt age & gender is 20.9 seconds    L: pt u/a- norm for pt age & gender is 21.6 seconds    3/4/21:    Dynamometer:    R:107.3#    L: 69.7#   9 hole peg:    R: 28.65 secs    L:3 mins 39 secs    Patient has made good  progress during treatment sessions toward set goals. Therapy emphasis to obtain goals:Strengthening, Endurance Training, Neuromuscular Re-education, Patient/Caregiver Education & Training, ROM, Cognitive Reorientation, Equipment Evaluation, Education, & procurement, Self-Care / ADL, Balance Training, Gait Training, Home Management Training, Functional Mobility Training, Safety Education & Training, Positioning     [x] Continue with current OT Plan of care.   [] Prepare for Discharge     DISCHARGE RECOMMENDATIONS  Recommended DME: extended tub bench    Post Discharge Care:   []Home Independently  []Home with 24hr Care / Supervision []Home with Partial Supervision []Home with Home Health OT []Home with Out Pt OT []Other: ___   Comments:         Time in Time out Tx Time Breakdown  Variance:   First Session  10:50am 11:43am [x] Individual Tx-53  [] Concurrent Tx -   [] Co-Tx -   [] Group Tx -   [] Time Missed -     Second Session 1430pm 15:15pm [x] Individual Tx-45   [] Concurrent Tx -  [] Co-Tx -   [] Group Tx -   [] Time Missed -     Third Session    [] Individual Tx-  [] Concurrent Tx -  [] Co-Tx -   [] Group Tx -   [] Time Missed -         Total Tx Time: 240 [facility]

## 2021-03-05 NOTE — PROGRESS NOTES
with R LBQC  SBA Mod I   Ambulation    75 feet with R LBQC with Cassi (WC follow) 300 feet x2 with R LBQC with  feet x1 rep and R LBQC with SBA  150 feet with AAD with  feet with AAD with Mod I   Walking 10 feet on uneven surface TBA 10 feet with R LBQC with SBA NT 10 feet with AAD with SBA 10 feet with AAD with Mod I   Wheel Chair Mobility NA NT NT     Car Transfers Cassi with R LBQC SBA with LBQC NT SBA Mod I   Stair negotiation: ascended and descended  4 steps with R rail with Cassi 8 steps with 1 rail with CGA (non-reciprocal pattern ascending and descending) NT 8 steps with 1 rail with SBA 12 steps with 1 rail with Mod I   Curb Step:   ascended and descended TBA 7.5 inch step with R LBQC and CGA N/T 7.5 inch step with AAD and SBA 7.5 inch step with AAD and Mod I   Picking up object off the floor TBA Picked up cone with SBA NT Will  2lb weight with SB assist Will  4lb weight with Mod I   BLE ROM WNL WNL      BLE Strength RLE grossly 5/5  L hip flexion 3+/5  L knee extension 4/5  L ankle DF/PF 4/5 RLE grossly 5/5  L hip flexion 3+/5  L knee extension 4/5  L ankle DF/PF 4/5      Balance  Static and dynamic standing balance Cassi with R LBQC Static and dynamic standing balance CGA with R LBQC      Date Family Teach Completed  Wife present 3/5/21      Is additional Family Teaching Needed?   Y or N Y N      Hindering Progress Hemiparesis Hemiparesis      PT recommended ELOS 3 weeks       Team's Discharge Plan        Therapist at Team Meeting          Therapeutic Exercise:   AM: Stepping over 3 quad canes with R LBQC and SBA x 2 reps, LLE leading   Standing on Air Ex and grabbing cone with LUE from left side and stacking them on mat table on right side, x6 reps with CGA  PM: Ambulating 250 feet x 1 rep without AD and CGA  Weaving between 4 quad canes without AD x 4 reps and CGA   Sit <> stand from WC x5 reps and SBA  Picking up ankle weights with LUE, stepping over 3 SPC, and placing ankle weight inside bucket without AD, x 4 reps and CGA     Patient education  Pt educated on stepping closer to curb step before stepping up/down    Patient response to education:   Pt verbalized understanding Pt demonstrated skill Pt requires further education in this area   yes yes yes     Additional Comments: Patients wife was present for family teach during AM session. Patients wife educated on guarding technique and proper sequencing with LBQC during activities. Patients wife demonstrated safe guarding technique with all activities and therapist was standing by to provide assistance if necessary. Patient continues to demonstrate consistency with L foot clearance during ambulation with LBQC and improvements in LUE. Plan to incorporate LUE exercises to promote improvements in LUE mobility. PM: Continued plan to include LUE mobility exercises. Patient demonstrates improvements in LUE strength as noted with patient picking up and carrying multiple 4 lb ankle weights with LUE. Patient continues to demonstrates moderate lateral postural sway when ambulating without AD. Plan to continue incorporating functional activities to promote improvements in patients balance and LUE mobility. AM  Time in: 0830  Time out: 0915    PM  Time in: 1345  Time out: 1430    Pt is making good progress toward established Physical Therapy goals. Continue with physical therapy current plan of care.     Evelia Hall, SPT  Sandeep Loo, PT, DPT  [id number]

## 2021-03-05 NOTE — PROGRESS NOTES
Progress Note  Date:3/5/2021       Room:5501/5501-  Patient Joan Watters     YOB: 1955     Age:65 y.o. Subjective    Subjective:  Symptoms:  Stable. He reports weakness. Diet:  Adequate intake. Activity level: Impaired due to weakness. Pain:  He complains of pain that is mild. Review of Systems   Neurological: Positive for weakness. Objective         Vitals Last 24 Hours:  TEMPERATURE:  Temp  Av.2 °F (36.8 °C)  Min: 97.8 °F (36.6 °C)  Max: 98.6 °F (37 °C)  RESPIRATIONS RANGE: Resp  Av  Min: 18  Max: 18  PULSE OXIMETRY RANGE: SpO2  Av.5 %  Min: 96 %  Max: 97 %  PULSE RANGE: Pulse  Av  Min: 72  Max: 78  BLOOD PRESSURE RANGE: Systolic (15RVK), TQI:285 , Min:119 , GLS:496   ; Diastolic (79VIR), ZLT:57, Min:66, Max:78    I/O (24Hr): Intake/Output Summary (Last 24 hours) at 3/5/2021 0756  Last data filed at 3/4/2021 1730  Gross per 24 hour   Intake 600 ml   Output    Net 600 ml     Objective:  General Appearance: In no acute distress. Vital signs: (most recent): Blood pressure (!) 140/66, pulse 72, temperature 98.6 °F (37 °C), temperature source Temporal, resp. rate 18, height 5' 9\" (1.753 m), weight (!) 307 lb 4 oz (139.4 kg), SpO2 97 %. Vital signs are normal.    Output: Producing urine and producing stool. Lungs:  Normal effort and normal respiratory rate. Breath sounds clear to auscultation. Heart: Normal rate. Regular rhythm. S1 normal and S2 normal.    Abdomen: Abdomen is soft. Bowel sounds are normal.   There is no abdominal tenderness. Extremities: Normal range of motion. Neurological: Patient is alert. (4/5 strength). Labs/Imaging/Diagnostics    Labs:  CBC:No results for input(s): WBC, RBC, HGB, HCT, MCV, RDW, PLT in the last 72 hours.   CHEMISTRIES:  Recent Labs     21  0400 21  0522    137   K 3.8 3.5    103   CO2 24 26   BUN 18 17   CREATININE 1.2 1.2   GLUCOSE 101* 103*     PT/INR:No results for input(s): PROTIME, INR in the last 72 hours. APTT:No results for input(s): APTT in the last 72 hours. LIVER PROFILE:No results for input(s): AST, ALT, BILIDIR, BILITOT, ALKPHOS in the last 72 hours. Imaging Last 24 Hours:  No results found. Assessment//Plan           Hospital Problems           Last Modified POA    Acute CVA (cerebrovascular accident) ([facility] [address] 2/23/2021 Yes      Assessment:      Date   Status   AE    Comments     Feeding   3/3/21   SBA             Grooming   3/3/21   Min A                   Oral Care   3/3/21   Supervision             Bathing   3/3/21   UB: SBA    LB:Min A             UB Dressing   3/3/21   CGA             LB Dressing   3/3/21   Min A   No AE         Footwear   3/3/21   SBA    adapted shoe laces         Toileting   3/3/21   Min A             Homemaking                          Functional Transfers / Balance:      Date Status DME  Comments   Sit Balance 3/4/21   Stand by Assist        Stand Balance 3/4/21   CGA        [x]? Tub           [x]? Shower   Transfer 3/1/21             3/3/21 Min A           CGA LBQC, large shower chair     Commode   Transfer 3/3/21   [facility]     Functional   Mobility 3/3/21   CGA LBQC     Other:                Assessment:    Condition: In stable condition. Improving.   (CVA). Plan:   Encourage ambulation. (Making good gains in therapy  Blood pressure is running a little low  I am going to stop the Norvasc  Potassium is 3.5 on the Aldactone and the potassium supplement).        Electronically signed by Margery Goldberg, MD on 3/5/21 at 7:56 AM EST

## 2021-03-06 PROCEDURE — 6360000002 HC RX W HCPCS: Performed by: INTERNAL MEDICINE

## 2021-03-06 PROCEDURE — 6370000000 HC RX 637 (ALT 250 FOR IP): Performed by: PHYSICAL MEDICINE & REHABILITATION

## 2021-03-06 PROCEDURE — 94660 CPAP INITIATION&MGMT: CPT

## 2021-03-06 PROCEDURE — 1280000000 HC REHAB R&B

## 2021-03-06 PROCEDURE — 97530 THERAPEUTIC ACTIVITIES: CPT

## 2021-03-06 PROCEDURE — 6370000000 HC RX 637 (ALT 250 FOR IP): Performed by: INTERNAL MEDICINE

## 2021-03-06 PROCEDURE — 99232 SBSQ HOSP IP/OBS MODERATE 35: CPT | Performed by: PHYSICAL MEDICINE & REHABILITATION

## 2021-03-06 RX ADMIN — CLOPIDOGREL 75 MG: 75 TABLET, FILM COATED ORAL at 09:04

## 2021-03-06 RX ADMIN — ENOXAPARIN SODIUM 40 MG: 40 INJECTION SUBCUTANEOUS at 09:01

## 2021-03-06 RX ADMIN — ASPIRIN 81 MG: 81 TABLET, CHEWABLE ORAL at 09:01

## 2021-03-06 RX ADMIN — METOPROLOL SUCCINATE 25 MG: 25 TABLET, EXTENDED RELEASE ORAL at 09:02

## 2021-03-06 RX ADMIN — POTASSIUM CHLORIDE 20 MEQ: 1500 TABLET, EXTENDED RELEASE ORAL at 16:41

## 2021-03-06 RX ADMIN — SERTRALINE 50 MG: 50 TABLET, FILM COATED ORAL at 09:02

## 2021-03-06 RX ADMIN — LOSARTAN POTASSIUM 50 MG: 50 TABLET, FILM COATED ORAL at 09:02

## 2021-03-06 RX ADMIN — SPIRONOLACTONE 50 MG: 25 TABLET ORAL at 09:02

## 2021-03-06 RX ADMIN — LEVOTHYROXINE SODIUM 75 MCG: 0.07 TABLET ORAL at 06:20

## 2021-03-06 RX ADMIN — POTASSIUM CHLORIDE 20 MEQ: 1500 TABLET, EXTENDED RELEASE ORAL at 12:59

## 2021-03-06 RX ADMIN — MESALAMINE 800 MG: 400 CAPSULE, DELAYED RELEASE ORAL at 09:02

## 2021-03-06 RX ADMIN — POTASSIUM CHLORIDE 20 MEQ: 1500 TABLET, EXTENDED RELEASE ORAL at 09:01

## 2021-03-06 RX ADMIN — ATORVASTATIN CALCIUM 40 MG: 40 TABLET, FILM COATED ORAL at 20:32

## 2021-03-06 RX ADMIN — INDOMETHACIN 25 MG: 25 CAPSULE ORAL at 09:02

## 2021-03-06 RX ADMIN — MESALAMINE 800 MG: 400 CAPSULE, DELAYED RELEASE ORAL at 12:59

## 2021-03-06 RX ADMIN — MESALAMINE 800 MG: 400 CAPSULE, DELAYED RELEASE ORAL at 20:32

## 2021-03-06 ASSESSMENT — PAIN SCALES - GENERAL
PAINLEVEL_OUTOF10: 0
PAINLEVEL_OUTOF10: 0

## 2021-03-06 NOTE — PROGRESS NOTES
PM&R Weekend Progress Note  Patient: Emma Valencia  Age/sex: 60 y.o. male  Medical Record #: [id number]  Date: 3/6/2021    Covering for: Dr. Adam Pereira    Subjective: Patient seen and examined in his room this morning. No issues overnight. No complaints this morning. Denies abdominal pain, chest pain, shortness of breath. All pertinent labs reviewed. No past medical history on file. No past surgical history on file. No family history on file. Objective:  Vitals:    03/04/21 1915 03/05/21 0830 03/06/21 0745 03/06/21 0915   BP: (!) 140/66 (!) 147/79 138/71 126/75   Pulse: 72 83 79 84   Resp: 18 18  18   Temp: 98.6 °F (37 °C) 98.6 °F (37 °C)  97.5 °F (36.4 °C)   TempSrc: Temporal Temporal  Temporal   SpO2: 97% 96%     Weight:       Height:         03/05 0701 - 03/06 0700  In: 720 [P.O.:720]  Out: -     GEN: NAD, conversational   HEENT: NC/AT, PERRLA, EOMI  RESP: CTAB, no R/R/W   CV: +S1 S2, RR   ABD: soft, NT, ND  EXT: Abnormal aROM, No clubbing/cyanosis, 2+ pulses   NEURO: Alert, no new focal deficits     Labs reviewed  CBC:   No results for input(s): WBC, HGB, PLT in the last 72 hours. BMP:    Recent Labs     03/05/21  0522      K 3.5      CO2 26   BUN 17   CREATININE 1.2   GLUCOSE 103*     Hepatic: No results for input(s): AST, ALT, ALB, BILITOT, ALKPHOS in the last 72 hours. BNP: No results for input(s): BNP in the last 72 hours. Lipids: No results for input(s): CHOL, HDL in the last 72 hours. Invalid input(s): LDLCALCU  INR: No results for input(s): INR in the last 72 hours. A/P  This is 60 y.o. male with CVA. Rehab: Continue extensive rehabilitation. Continue physical therapy, occupational therapy, and speech-language pathology. No change in medications. Continue current management. Emeka Groves DO, FAAPMR  Physical Medicine and Rehabilitation     Please note that the above documentation was prepared using voice recognition software.   Every attempt was made to ensure accuracy but there may be spelling, grammatical, and contextual errors.

## 2021-03-06 NOTE — PROGRESS NOTES
Physical Therapy    Facility/Department: [address] REHAB  Daily Treatment Note    NAME: Naye Mendes  : 1955  MRN: [id number]    Date of Service: 3/6/2021    Evaluating Therapist: Loida Patel PT, DPT    ROOM: [facility]  DIAGNOSIS: CVA  PRECAUTIONS: falls, L hemiparesis (UE worse than LE), sling, hx oral cancer, hx CVA in 2016 with mild chronic L hemiparesis, hx R meniscus repair in 2016, TSM, general diet  HPI: Pt is a 60year old male who developed sudden onset of left hemiparesis and was brought to [facility]. [facility] on 2021. He was not felt to be a candidate for t-PA. MRI brain not able to be done due to patient's BMI. CT perfusion showed small region of ischemic penumbra in the distribution of the R HEMA. Mobile unit MRI showed findings consistent with acute infarct in the right middle cerebral artery. He was put on antiplatelet agents. Social:  Pt is in town from [facility] with wife, visiting and staying with his daughter and her family ( and 25year old son, all work). In [facility], pt lives with wife in a 2 floor plan with 1 ANITA + 3 steps without HR to main floor. Bedroom and bathroom on first floor. Laundry in basement with full flight to reach. Wife able to provide supervision as needed. Pt may return to daughter's home which is a split level with 1 ANITA + 4 steps with R HR to reach main floor where bedroom and bathroom are located. Prior to admission: Pt ambulated with R SPC, was functionally independent. Initial Evaluation  21 AM    PM Short Term Goals Long Term Goals    Was pt agreeable to Eval/treatment? yes yes      Does pt have pain?  No c/o pain No c/o pain       Bed Mobility  Rolling: Cassi  Supine to sit: Cassi  Sit to supine: Cassi  Scooting: Cassi                 NT  SBA Mod I   Transfers Sit to stand: Cassi  Stand to sit: Cassi  Stand pivot: Cassi with R LBQC Sit to stand: SBA  Stand to sit: SBA  Stand pivot with LBQC with SBA  Sit to stand from low seat mod A     SBA Mod I Ambulation    75 feet with R LBQC with Cassi (WC follow) 300 feet x2 with R LBQC with SBA   150 feet with AAD with  feet with AAD with Mod I   Walking 10 feet on uneven surface TBA                NT  10 feet with AAD with SBA 10 feet with AAD with Mod I   Wheel Chair Mobility NA NT      Car Transfers Cassi with R LBQC SBA with LBQC  SBA Mod I   Stair negotiation: ascended and descended  4 steps with R rail with Cassi 8 steps with 1 rail with CGA (non-reciprocal pattern ascending and descending)  8 steps with 1 rail with SBA 12 steps with 1 rail with Mod I   Curb Step:   ascended and descended TBA NT  7.5 inch step with AAD and SBA 7.5 inch step with AAD and Mod I   Picking up object off the floor TBA NT  Will  2lb weight with SB assist Will  4lb weight with Mod I   BLE ROM WNL NT      BLE Strength RLE grossly 5/5  L hip flexion 3+/5  L knee extension 4/5  L ankle DF/PF 4/5       Balance  Static and dynamic standing balance Cassi with R LBQC Static and dynamic standing balance SBA with R LBQC      Date Family Teach Completed        Is additional Family Teaching Needed? Y or N Y       Hindering Progress Hemiparesis Hemiparesis      PT recommended ELOS 3 weeks       Team's Discharge Plan        Therapist at Team Meeting          Therapeutic Exercise:     Sit <> stand from [facility] x5 reps and SBA      Patient education  Pt educated on safety w/ functional mobility   Patient response to education:   Pt verbalized understanding Pt demonstrated skill Pt requires further education in this area   yes yes yes     Additional Comments: Patient demonstrated good L foot clearance during ambulation with LBQC and use of  LUE w/ functional tasks. AM  Time in: 820  Time out: 850    Pt is making good progress toward established Physical Therapy goals. Continue with physical therapy current plan of care.       Elina Fernandez PTA [id number]

## 2021-03-07 PROCEDURE — 6370000000 HC RX 637 (ALT 250 FOR IP): Performed by: INTERNAL MEDICINE

## 2021-03-07 PROCEDURE — 1280000000 HC REHAB R&B

## 2021-03-07 PROCEDURE — 94660 CPAP INITIATION&MGMT: CPT

## 2021-03-07 PROCEDURE — 97535 SELF CARE MNGMENT TRAINING: CPT

## 2021-03-07 PROCEDURE — 6360000002 HC RX W HCPCS: Performed by: INTERNAL MEDICINE

## 2021-03-07 PROCEDURE — 6370000000 HC RX 637 (ALT 250 FOR IP): Performed by: PHYSICAL MEDICINE & REHABILITATION

## 2021-03-07 RX ADMIN — ATORVASTATIN CALCIUM 40 MG: 40 TABLET, FILM COATED ORAL at 20:24

## 2021-03-07 RX ADMIN — INDOMETHACIN 25 MG: 25 CAPSULE ORAL at 08:08

## 2021-03-07 RX ADMIN — METOPROLOL SUCCINATE 25 MG: 25 TABLET, EXTENDED RELEASE ORAL at 08:07

## 2021-03-07 RX ADMIN — MESALAMINE 800 MG: 400 CAPSULE, DELAYED RELEASE ORAL at 20:24

## 2021-03-07 RX ADMIN — LEVOTHYROXINE SODIUM 75 MCG: 0.07 TABLET ORAL at 06:30

## 2021-03-07 RX ADMIN — ASPIRIN 81 MG: 81 TABLET, CHEWABLE ORAL at 08:07

## 2021-03-07 RX ADMIN — POTASSIUM CHLORIDE 20 MEQ: 1500 TABLET, EXTENDED RELEASE ORAL at 08:07

## 2021-03-07 RX ADMIN — SERTRALINE 50 MG: 50 TABLET, FILM COATED ORAL at 08:07

## 2021-03-07 RX ADMIN — MESALAMINE 800 MG: 400 CAPSULE, DELAYED RELEASE ORAL at 08:07

## 2021-03-07 RX ADMIN — POTASSIUM CHLORIDE 20 MEQ: 1500 TABLET, EXTENDED RELEASE ORAL at 11:46

## 2021-03-07 RX ADMIN — SPIRONOLACTONE 50 MG: 25 TABLET ORAL at 08:07

## 2021-03-07 RX ADMIN — CLOPIDOGREL 75 MG: 75 TABLET, FILM COATED ORAL at 08:07

## 2021-03-07 RX ADMIN — ENOXAPARIN SODIUM 40 MG: 40 INJECTION SUBCUTANEOUS at 08:07

## 2021-03-07 RX ADMIN — POTASSIUM CHLORIDE 20 MEQ: 1500 TABLET, EXTENDED RELEASE ORAL at 16:19

## 2021-03-07 RX ADMIN — LOSARTAN POTASSIUM 50 MG: 50 TABLET, FILM COATED ORAL at 08:07

## 2021-03-07 RX ADMIN — MESALAMINE 800 MG: 400 CAPSULE, DELAYED RELEASE ORAL at 13:51

## 2021-03-07 ASSESSMENT — PAIN SCALES - GENERAL
PAINLEVEL_OUTOF10: 0
PAINLEVEL_OUTOF10: 0

## 2021-03-07 NOTE — PLAN OF CARE
Problem: Falls - Risk of:  Goal: Will remain free from falls  Description: Will remain free from falls  3/7/2021 0733 by Neville Luna RN  Outcome: Met This Shift  3/6/2021 2155 by Juan Nichols  Outcome: Met This Shift  Goal: Absence of physical injury  Description: Absence of physical injury  3/7/2021 0733 by Neville Luna RN  Outcome: Met This Shift  3/6/2021 2155 by Juan Nichols  Outcome: Met This Shift     Problem: Skin Integrity:  Goal: Will show no infection signs and symptoms  Description: Will show no infection signs and symptoms  3/7/2021 0733 by Neville Luna RN  Outcome: Met This Shift  3/6/2021 2155 by Juan Nichols  Outcome: Met This Shift  Goal: Absence of new skin breakdown  Description: Absence of new skin breakdown  3/7/2021 0733 by Neville Luna RN  Outcome: Met This Shift  3/6/2021 2155 by Juan Nichols  Outcome: Met This Shift     Problem: Neurological  Goal: Maximum potential motor/sensory/cognitive function  3/7/2021 0733 by Neville Luna RN  Outcome: Met This Shift  3/6/2021 2155 by Juan Nichols  Outcome: Met This Shift

## 2021-03-07 NOTE — PROGRESS NOTES
OCCUPATIONAL THERAPY DAILY NOTE    Date:3/7/2021  Patient Name: Theresa Ellison  MRN: [id number]  : 1955  Room: [address] [place] [address]   Referring Practitioner: Arielle Lindsey MD  Diagnosis: CVA- RMCA  Additional Pertinent Hx: HLD, HTN, obese & hypothyroidism    Precautions: Fall Risk, L hemiparesis    Functional Assessment:   Date Status AE  Comments   Feeding 3/3/21 SBA     Grooming 3/7/21 SUP  At w/c level patient combed his hair, brushed his teeth and donned doeodorant         Oral Care 3/7/21 Supervision  At w/c level patient brushed his teeth   Bathing 3/7/21 UB: SBA  LB:Min A  Seated on bariatric extended tub bench, patient was able to perform all aspects of UB bathng. Pt stood using grab bar for balance for LB bathing with assist for balance. Assist for seated balance to bend down to was B feet. UB Dressing 3/7/21 SBA  Pt donned short sleeve pull over shirt   LB Dressing 3/7/21 Min a No AE Assist to balance/steady while standing at sink to don underwear and elastic waist shorts over hips   Footwear 3/7/21 Set up  adapted shoe laces Patient donned B socks and B shoes with adaptive elastic laces   Toileting 3/3/21 Min A     Homemaking 3/5/21 CGA/Min A LBQC      Functional Transfers / Balance:   Date Status DME  Comments   Sit Balance 3/7/21 Stand by Assist/CGA   Dynamic when reaching to wash feet   Stand Balance 3/7/21 CGA/min A Grab bar demo'd during LB bathing and dressing for balance and to steady   [] Tub        [x] Shower   Transfer 3/1/21        3/7/21 Min A        CGA LBQC, large shower chair Pt uses LBQC to transfer from w/c to extended tub bench   Commode   [facility]    Functional   Mobility 3/7/21 CGA LBQC Through out patient's room   Other: sit to stand 3/5/21 CGA [facility]      Functional Exercises / Activity:  Full ADL completed this date to increase independence and safety for bathing/dressing upon returning home.      Sensory / Neuromuscular Re-Education:      Cognitive Skills:   Status Comments Problem   Solving fair     Memory fair     Sequencing fair     Safety fair       Visual Perception:    Education:  Pt educated in regards to safety with LB bathing for fall prevention         3/1/21: shoe laces adapted for ease with donning shoes    [] Family teach completed on:    Pain Level: 0/10    Additional Notes:   3/2/21- Pt plan of care of updated on this date. Pt tentative length of stay is 2 weeks. Recommend family teach this week. 2/24/21:    Dynamometer:    R:105# & norm for pt age & gender is 89#    L:38# & norm for pt age & gender is 77#   9 hole peg:    R:36.7 seconds & norm for pt age & gender is 20.9 seconds    L: pt u/a- norm for pt age & gender is 21.6 seconds    3/4/21:    Dynamometer:    R:107.3#    L: 69.7#   9 hole peg:    R: 28.65 secs    L:3 mins 39 secs    Patient has made good  progress during treatment sessions toward set goals. Therapy emphasis to obtain goals:Strengthening, Endurance Training, Neuromuscular Re-education, Patient/Caregiver Education & Training, ROM, Cognitive Reorientation, Equipment Evaluation, Education, & procurement, Self-Care / ADL, Balance Training, Gait Training, Home Management Training, Functional Mobility Training, Safety Education & Training, Positioning     [x] Continue with current OT Plan of care.   [] Prepare for Discharge     DISCHARGE RECOMMENDATIONS  Recommended DME: extended tub bench    Post Discharge Care:   []Home Independently  []Home with 24hr Care / Supervision []Home with Partial Supervision []Home with Home Health OT []Home with Out Pt OT []Other: ___   Comments:         Time in Time out Tx Time Breakdown  Variance:   First Session  [id number] [x] Individual Tx-63 mins  [] Concurrent Tx -   [] Co-Tx -   [] Group Tx -   [] Time Missed -     Second Session   [] Individual Tx-   [] Concurrent Tx -  [] Co-Tx -   [] Group Tx -   [] Time Missed -     Third Session    [] Individual Tx-  [] Concurrent Tx -  [] Co-Tx -   [] Group Tx -   [] Time Missed -

## 2021-03-07 NOTE — PLAN OF CARE
Problem: Falls - Risk of:  Goal: Will remain free from falls  Description: Will remain free from falls  3/7/2021 1854 by Venus Reaves  Outcome: Met This Shift  3/7/2021 0733 by Bipin Farley RN  Outcome: Met This Shift  Goal: Absence of physical injury  Description: Absence of physical injury  3/7/2021 1854 by Venus Reaves  Outcome: Met This Shift  3/7/2021 0733 by Bipin Farley RN  Outcome: Met This Shift     Problem: Skin Integrity:  Goal: Will show no infection signs and symptoms  Description: Will show no infection signs and symptoms  3/7/2021 1854 by Venus Reaves  Outcome: Met This Shift  3/7/2021 0733 by Bipin Farley RN  Outcome: Met This Shift  Goal: Absence of new skin breakdown  Description: Absence of new skin breakdown  3/7/2021 1854 by Venus Reaves  Outcome: Met This Shift  3/7/2021 0733 by Bipin Farley RN  Outcome: Met This Shift     Problem: Neurological  Goal: Maximum potential motor/sensory/cognitive function  3/7/2021 1854 by Venus Reaves  Outcome: Met This Shift  3/7/2021 0733 by Bipin Farley RN  Outcome: Met This Shift     Problem: HEMODYNAMIC STATUS  Goal: Patient has stable vital signs and fluid balance  Outcome: Met This Shift     Problem: ACTIVITY INTOLERANCE/IMPAIRED MOBILITY  Goal: Mobility/activity is maintained at optimum level for patient  Outcome: Met This Shift     Problem: COMMUNICATION IMPAIRMENT  Goal: Ability to express needs and understand communication  Outcome: Met This Shift

## 2021-03-08 LAB
ANION GAP SERPL CALCULATED.3IONS-SCNC: 7 MMOL/L (ref 7–16)
BUN BLDV-MCNC: 17 MG/DL (ref 8–23)
CALCIUM SERPL-MCNC: 8.7 MG/DL (ref 8.6–10.2)
CHLORIDE BLD-SCNC: 109 MMOL/L (ref 98–107)
CO2: 24 MMOL/L (ref 22–29)
CREAT SERPL-MCNC: 1.2 MG/DL (ref 0.7–1.2)
GFR AFRICAN AMERICAN: >60
GFR NON-AFRICAN AMERICAN: >60 ML/MIN/1.73
GLUCOSE BLD-MCNC: 104 MG/DL (ref 74–99)
POTASSIUM SERPL-SCNC: 3.6 MMOL/L (ref 3.5–5)
SODIUM BLD-SCNC: 140 MMOL/L (ref 132–146)

## 2021-03-08 PROCEDURE — 6370000000 HC RX 637 (ALT 250 FOR IP): Performed by: PHYSICAL MEDICINE & REHABILITATION

## 2021-03-08 PROCEDURE — 97110 THERAPEUTIC EXERCISES: CPT

## 2021-03-08 PROCEDURE — 1280000000 HC REHAB R&B

## 2021-03-08 PROCEDURE — 97112 NEUROMUSCULAR REEDUCATION: CPT

## 2021-03-08 PROCEDURE — 97530 THERAPEUTIC ACTIVITIES: CPT

## 2021-03-08 PROCEDURE — 94660 CPAP INITIATION&MGMT: CPT

## 2021-03-08 PROCEDURE — 36415 COLL VENOUS BLD VENIPUNCTURE: CPT

## 2021-03-08 PROCEDURE — 80048 BASIC METABOLIC PNL TOTAL CA: CPT

## 2021-03-08 PROCEDURE — 6360000002 HC RX W HCPCS: Performed by: INTERNAL MEDICINE

## 2021-03-08 PROCEDURE — 6370000000 HC RX 637 (ALT 250 FOR IP): Performed by: INTERNAL MEDICINE

## 2021-03-08 RX ADMIN — POTASSIUM CHLORIDE 20 MEQ: 1500 TABLET, EXTENDED RELEASE ORAL at 16:26

## 2021-03-08 RX ADMIN — METOPROLOL SUCCINATE 25 MG: 25 TABLET, EXTENDED RELEASE ORAL at 08:14

## 2021-03-08 RX ADMIN — MESALAMINE 800 MG: 400 CAPSULE, DELAYED RELEASE ORAL at 21:10

## 2021-03-08 RX ADMIN — ATORVASTATIN CALCIUM 40 MG: 40 TABLET, FILM COATED ORAL at 23:17

## 2021-03-08 RX ADMIN — SPIRONOLACTONE 50 MG: 25 TABLET ORAL at 08:15

## 2021-03-08 RX ADMIN — MESALAMINE 800 MG: 400 CAPSULE, DELAYED RELEASE ORAL at 16:25

## 2021-03-08 RX ADMIN — POTASSIUM CHLORIDE 20 MEQ: 1500 TABLET, EXTENDED RELEASE ORAL at 08:15

## 2021-03-08 RX ADMIN — POTASSIUM CHLORIDE 20 MEQ: 1500 TABLET, EXTENDED RELEASE ORAL at 11:02

## 2021-03-08 RX ADMIN — LOSARTAN POTASSIUM 50 MG: 50 TABLET, FILM COATED ORAL at 08:15

## 2021-03-08 RX ADMIN — CLOPIDOGREL 75 MG: 75 TABLET, FILM COATED ORAL at 08:15

## 2021-03-08 RX ADMIN — MESALAMINE 800 MG: 400 CAPSULE, DELAYED RELEASE ORAL at 08:15

## 2021-03-08 RX ADMIN — ENOXAPARIN SODIUM 40 MG: 40 INJECTION SUBCUTANEOUS at 08:14

## 2021-03-08 RX ADMIN — SERTRALINE 50 MG: 50 TABLET, FILM COATED ORAL at 08:15

## 2021-03-08 RX ADMIN — ASPIRIN 81 MG: 81 TABLET, CHEWABLE ORAL at 08:14

## 2021-03-08 ASSESSMENT — PAIN SCALES - GENERAL: PAINLEVEL_OUTOF10: 0

## 2021-03-08 NOTE — PROGRESS NOTES
Physical Therapy    Facility/Department: [address] REHAB  Weekly Team Note    NAME: Angel Marte  : 1955  MRN: [id number]    Date of Service: 3/8/2021    Evaluating Therapist: Radha Renteria PT, DPT    ROOM: [facility]  DIAGNOSIS: CVA  PRECAUTIONS: falls, L hemiparesis (UE worse than LE), sling, hx oral cancer, hx CVA in 2016 with mild chronic L hemiparesis, hx R meniscus repair in 2016, TSM, general diet  HPI: Pt is a 60year old male who developed sudden onset of left hemiparesis and was brought to [facility]. Myrna Diez on 2021. He was not felt to be a candidate for t-PA. MRI brain not able to be done due to patient's BMI. CT perfusion showed small region of ischemic penumbra in the distribution of the R HEMA. Mobile unit MRI showed findings consistent with acute infarct in the right middle cerebral artery. He was put on antiplatelet agents. Social:  Pt is in town from [facility] with wife, visiting and staying with his daughter and her family ( and 25year old son, all work). In [facility], pt lives with wife in a 2 floor plan with 1 ANITA + 3 steps without HR to main floor. Bedroom and bathroom on first floor. Laundry in basement with full flight to reach. Wife able to provide supervision as needed. Pt may return to daughter's home which is a split level with 1 ANITA + 4 steps with R HR to reach main floor where bedroom and bathroom are located. Prior to admission: Pt ambulated with R SPC, was functionally independent. Initial Evaluation  2/24/21 3/1/21     3/8/21 Comments    Short Term Goals Long Term Goals    Was pt agreeable to Eval/treatment? yes yes yes      Does pt have pain?  No c/o pain Pt c/o of moderate L foot pain  No c/o pain       Bed Mobility  Rolling: Cassi  Supine to sit: Cassi  Sit to supine: Cassi  Scooting: Cassi Rolling: SBA  Supine to sit:SBA   Sit to supine: SBA  Scooting: SBA Rolling: Supervision   Supine to sit: Supervision   Sit to supine: Supervision  Scooting: Supervision     (twin bed) Minor cueing for hand placement SBA Mod I   Transfers Sit to stand: Cassi  Stand to sit: Cassi  Stand pivot: Cassi with R LBQC Sit to stand CGA  Stand to sit CGA  Stand pivot CGA Sit to stand: Supervision   Stand to sit: Supervision  Stand pivot with SBQC with SBA  SBA Mod I   Ambulation    75 feet with R LBQC with Cassi (WC follow) 150 feet x1 rep and R LBQC with CGA/Cassi 400 feet x1 with R SBQC with SBA Demonstrates consistent L foot clearance when ambulating. Progressed patient from [facility] due to improved gait pattern    150 feet with AAD with  feet with AAD with Mod I   Car Transfers Cassi with R LBQC CGA with R LBQC Supervision with R SBQC  SBA Mod I   Stair negotiation: ascended and descended  4 steps with R rail with Cassi 8 steps with 1 HR with CGA (non-reciprocal pattern) 12 steps with 1 rail with SBA (non-reciprocal pattern)  8 steps with 1 rail with SBA 12 steps with 1 rail with Mod I   Curb Step:   ascended and descended TBA N/T N/T  7.5 inch step with AAD and SBA 7.5 inch step with AAD and Mod I   BLE ROM WNL WNL WNL      BLE Strength RLE grossly 5/5  L hip flexion 3+/5  L knee extension 4/5  L ankle DF/PF 4/5 RLE grossly 5/5  L hip flexion 3+/5  L knee extension 4/5  L ankle DF/PF 4/5 RLE grossly 5/5  L hip flexion 4/5  L knee extension 5/5  L ankle DF/PF 4/5      Balance  Static and dynamic standing balance Cassi with R LBQC Static and dynamic standing balance CGA with R LBQC Static and dynamic standing balance SBA with R SBQC      Date Family Teach Completed   Wife present 3/5/21      Is additional Family Teaching Needed?   Y or N Y Y N      Hindering Progress Hemiparesis Hemiparesis Hemiparesis      PT recommended ELOS 3 weeks 2 weeks 1 week      Team's Discharge Plan  2 weeks Discharge Friday 3/12/21      Therapist at Team Meeting  Sotero Villasenor, VINNY [id number]   Roe Shah PT, DPT [id number]          Date: 3/8/21  Supporting factors: Demonstrates improvements in physical function and dynamic standing balance with AD  Barriers to discharge: Dynamic balance during functional activities without AD    Additional comments: Progressed patient [facility] due to improved gait pattern. Patient demonstrates moderate lateral sway when ambulating without AD. Ambulating without AD to promote improvements in balance however currently recommend AD at all times. DME:  [facility]  After Care: Outpatient PT      Date:  3/1/21  Supporting factors: Demonstrates good static standing weight-shifts and improvements in physical function/motor control of hemiparetic extremities    Barriers to discharge: BMI, dynamic standing balance  Additional comments: Continued education needed to promote improvements in L foot c/learance, minor LOB when L toe catches during ambulation with [facility] however patient able to self-correct without hand-on assistance. Anticipate pt will not require AFO due to consistently improving motor function.    DME:  TBD  After Care:  TBD    Julian Anderson, SPT  Charley Oconnor, PT, DPT  [phone]

## 2021-03-08 NOTE — PROGRESS NOTES
OCCUPATIONAL THERAPY DAILY NOTE    Date:3/8/2021  Patient Name: Heena Hemphill  MRN: [id number]  : 1955  Room: [address] [place] [address]   Referring Practitioner: Daniel Choe MD  Diagnosis: CVA- RMCA  Additional Pertinent Hx: HLD, HTN, obese & hypothyroidism    Precautions: Fall Risk, L hemiparesis    Functional Assessment:   Date Status AE  Comments   Feeding 3/8/21 Mod I  Pt able to open packages and dominantly use R hand in preparation for self feeding   Grooming 3/7/21 SUP           Oral Care 3/7/21 Supervision     Bathing 3/7/21 UB: SBA  LB:Min A     UB Dressing 3/7/21 SBA     LB Dressing 3/7/21 Min a No AE    Footwear 3/7/21 Set up  adapted shoe laces    Toileting 3/8/21 CGA     Homemaking 3/5/21 CGA/Min A LBQC      Functional Transfers / Balance:   Date Status DME  Comments   Sit Balance 3/8/21 SUP     Stand Balance 3/8/21 SBA Grab bar    [x] Tub        [x] Shower   Transfer 3/8/21        3/7/21 SBA        CGA SBQC, large shower chair Sit down method onto extended tub bench in AM    Min A to step in/out of tub in AM   Commode   Transfer 3/8/21 SBA SBQC    Functional   Mobility 3/8/21 SBA SBQC To/from bathroom in AM   Other: sit to stand 3/5/21 CGA SBQC      Functional Exercises / Activity:  Pt sitting in chair upon arrival to OT gym in AM. Completed functional transfers, see above for assessment. In OT gym, Engaged in therex using rickshaw, 3x15 reps,  25# to increase BUE strength for ease with functional transfers. Participated in shoulder ladder, placing/removing nuts/bolts to increase fine motor coordination, strength, and ROM. Pt able to place all wooden plates with increased time due to LUE fatigue. Pt appeared to have tolerated session well. Sensory / Neuromuscular Re-Education:  Pt tolerated Inmotion robotic arm intervention. Pt sat in chair with arm straps for positioning purposes. Pt used LUE & 14 cm [place].  Pt tolerated pre & post test along with random, error Augmentin 3x & fan south protocol for a total of 704 total movement & complete a maze. Pt made the following gains in his pre & post testing- smoothness improved from 0.399 to 0.419, reach error improved from 0.009 to 0.008 & initiation time decreased from 0.089 seconds to 0.006 seconds. Pt shown his results & correlated to gains in ADLs & pt was pleased with his progress. Cognitive Skills:   Status Comments   Problem   Solving good    Memory good    Sequencing good    Safety fair +      Visual Perception:    Education:  Pt educated in regards to safety with LB bathing for fall prevention    3/1/21: shoe laces adapted for ease with donning shoes    [] Family teach completed on:    Pain Level: 0/10    Additional Notes:   3/2/21- Pt plan of care of updated on this date. Pt tentative length of stay is 2 weeks. Recommend family teach this week. 2/24/21:    Dynamometer:    R:105# & norm for pt age & gender is 89#    L:38# & norm for pt age & gender is 77#   9 hole peg:    R:36.7 seconds & norm for pt age & gender is 20.9 seconds    L: pt u/a- norm for pt age & gender is 21.6 seconds    3/4/21:    Dynamometer:    R:107.3#    L: 69.7#   9 hole peg:    R: 28.65 secs    L:3 mins 39 secs    Patient has made good  progress during treatment sessions toward set goals. Therapy emphasis to obtain goals:Strengthening, Endurance Training, Neuromuscular Re-education, Patient/Caregiver Education & Training, ROM, Cognitive Reorientation, Equipment Evaluation, Education, & procurement, Self-Care / ADL, Balance Training, Gait Training, Home Management Training, Functional Mobility Training, Safety Education & Training, Positioning     [x] Continue with current OT Plan of care.   [] Prepare for Discharge     DISCHARGE RECOMMENDATIONS  Recommended DME: extended tub bench    Post Discharge Care:   []Home Independently  []Home with 24hr Care / Supervision []Home with Partial Supervision []Home with Home Health OT []Home with Out Pt OT []Other: ___   Comments:         Time in Time out Tx Time Breakdown  Variance:   First Session  [id number] [x] Individual Tx-45  [] Concurrent Tx -   [] Co-Tx -   [] Group Tx -   [] Time Missed -     Second Session [phone] [x] Individual Tx- 45  [] Concurrent Tx -  [] Co-Tx -   [] Group Tx -   [] Time Missed -     Third Session    [] Individual Tx-  [] Concurrent Tx -  [] Co-Tx -   [] Group Tx -   [] Time Missed -         Total Tx Time: [facility], [address] [facility], OTR/L 8651 [facility] OTR/L [id number]

## 2021-03-08 NOTE — PROGRESS NOTES
Progress Note  Date:3/8/2021       Room:5501/5501-B  Patient Madie Pride     YOB: 1955     Age:65 y.o. Subjective    Subjective:  Symptoms:  Stable. He reports weakness. Diet:  Adequate intake. Activity level: Impaired due to weakness. Pain:  He reports no pain. Review of Systems   Neurological: Positive for weakness. Objective         Vitals Last 24 Hours:  TEMPERATURE:  No data recorded  RESPIRATIONS RANGE: No data recorded  PULSE OXIMETRY RANGE: No data recorded  PULSE RANGE: No data recorded  BLOOD PRESSURE RANGE: No data recorded.  ; No data recorded. I/O (24Hr): Intake/Output Summary (Last 24 hours) at 3/8/2021 0737  Last data filed at 3/7/2021 1800  Gross per 24 hour   Intake 600 ml   Output    Net 600 ml     Objective:  General Appearance: In no acute distress. Vital signs: (most recent): Blood pressure 138/85, pulse 83, temperature 98.4 °F (36.9 °C), temperature source Temporal, resp. rate 18, height 5' 9\" (1.753 m), weight (!) 307 lb 4 oz (139.4 kg), SpO2 98 %. Vital signs are normal.    Output: Producing urine and producing stool. Lungs:  Normal effort and normal respiratory rate. Breath sounds clear to auscultation. Heart: Normal rate. Regular rhythm. S1 normal and S2 normal.    Abdomen: Abdomen is soft. Bowel sounds are normal.   There is no abdominal tenderness. Extremities: Normal range of motion. Neurological: Patient is alert. (4/5 strength). Labs/Imaging/Diagnostics    Labs:  CBC:No results for input(s): WBC, RBC, HGB, HCT, MCV, RDW, PLT in the last 72 hours. CHEMISTRIES:  Recent Labs     03/08/21  0514      K 3.6   *   CO2 24   BUN 17   CREATININE 1.2   GLUCOSE 104*     PT/INR:No results for input(s): PROTIME, INR in the last 72 hours. APTT:No results for input(s): APTT in the last 72 hours. LIVER PROFILE:No results for input(s): AST, ALT, BILIDIR, BILITOT, ALKPHOS in the last 72 hours.     Imaging Last 24 Hours: No results found. Assessment//Plan           Hospital Problems           Last Modified POA    Acute CVA (cerebrovascular accident) ([facility] [address] 2/23/2021 Yes      Assessment:      Date   Status   AE    Comments     Feeding   3/3/21   SBA             Grooming   3/7/21   SUP       At w/c level patient combed his hair, brushed his teeth and donned doeodorant           Oral Care   3/7/21   Supervision       At w/c level patient brushed his teeth     Bathing   3/7/21   UB: SBA    LB:Min A       Seated on bariatric extended tub bench, patient was able to perform all aspects of UB bathng. Pt stood using grab bar for balance for LB bathing with assist for balance. Assist for seated balance to bend down to was B feet. UB Dressing   3/7/21   SBA       Pt donned short sleeve pull over shirt     LB Dressing   3/7/21   Min a   No AE   Assist to balance/steady while standing at sink to don underwear and elastic waist shorts over hips     Footwear   3/7/21   Set up    adapted shoe laces   Patient donned B socks and B shoes with adaptive elastic laces     Toileting   3/3/21   Min A             Homemaking   3/5/21   CGA/Min A   LBQC              Functional Transfers / Balance:      Date Status DME  Comments   Sit Balance 3/7/21   Stand by Assist/CGA    Dynamic when reaching to wash feet   Stand Balance 3/7/21   CGA/min A Grab bar demo'd during LB bathing and dressing for balance and to steady   []? Tub           [x]?  Shower   Transfer 3/1/21             3/7/21 Min A           CGA LBQC, large shower chair Pt uses LBQC to transfer from w/c to extended tub bench   Commode   Transfer 3/3/21   [address]     Functional   Mobility 3/7/21   CGA LBQC Through out patient's room   Other: sit to stand 3/5/21 CGA [facility]        Functional Exercises / Activity:  Full ADL completed this date to increase independence and safety for bathing/dressing upon returning home.      Sensory / Neuromuscular Re-Education:        Cognitive Skills:    Status Comments   Problem   Solving fair      Memory fair      Sequencing fair      Safety fair           Assessment:    Condition: In stable condition. Improving.   (CVA). Plan:   Encourage ambulation. (Tolerating therapy well  Improving strength and balance  Still some unsteadiness and requires assist  The gout has resolved  Metabolic profile has been stable  Can discontinue the Indocin and the routine blood work).        Electronically signed by Win Pressley MD on 3/8/21 at 7:37 AM EST

## 2021-03-08 NOTE — PROGRESS NOTES
Physical Therapy    Facility/Department: [address] REHAB  Daily Treatment Note    NAME: Rosie Soto  : 1955  MRN: [id number]    Date of Service: 3/8/2021    Evaluating Therapist: Charley Oconnor PT, DPT    ROOM: [facility]  DIAGNOSIS: CVA  PRECAUTIONS: falls, L hemiparesis (UE worse than LE), sling, hx oral cancer, hx CVA in 2016 with mild chronic L hemiparesis, hx R meniscus repair in 2016, TSM, general diet  HPI: Pt is a 60year old male who developed sudden onset of left hemiparesis and was brought to [facility]. Aguilar Bread on 2021. He was not felt to be a candidate for t-PA. MRI brain not able to be done due to patient's BMI. CT perfusion showed small region of ischemic penumbra in the distribution of the R HEMA. Mobile unit MRI showed findings consistent with acute infarct in the right middle cerebral artery. He was put on antiplatelet agents. Social:  Pt is in town from [facility] with wife, visiting and staying with his daughter and her family ( and 25year old son, all work). In [facility], pt lives with wife in a 2 floor plan with 1 ANITA + 3 steps without HR to main floor. Bedroom and bathroom on first floor. Laundry in basement with full flight to reach. Wife able to provide supervision as needed. Pt may return to daughter's home which is a split level with 1 ANITA + 4 steps with R HR to reach main floor where bedroom and bathroom are located. Prior to admission: Pt ambulated with R SPC, was functionally independent. Initial Evaluation  21 AM    PM Short Term Goals Long Term Goals    Was pt agreeable to Eval/treatment? yes yes yes     Does pt have pain?  No c/o pain No c/o pain  No c/o pain     Bed Mobility  Rolling: Cassi  Supine to sit: Cassi  Sit to supine: Cassi  Scooting: Cassi Rolling: Supervision   Supine to sit: Supervision   Sit to supine: Supervision  Scooting: Supervision    (twin bed) NT SBA Mod I   Transfers Sit to stand: Cassi  Stand to sit: Cassi  Stand pivot: Cassi with R LBQC Sit to stand: Supervision   Stand to sit: Supervision  Stand pivot with SBQC with SBA   Sit to stand: Supervision   Stand to sit: Supervision  Stand pivot with SBQC with SBA SBA Mod I   Ambulation    75 feet with R LBQC with Cassi (WC follow) 400 feet x1 with R LBQC with  feet x1 rep and R SBQC with SBA  150 feet with AAD with  feet with AAD with Mod I   Walking 10 feet on uneven surface TBA NT NT 10 feet with AAD with SBA 10 feet with AAD with Mod I   Wheel Chair Mobility NA NT NT     Car Transfers Cassi with R LBQC Supervision with SBQC NT SBA Mod I   Stair negotiation: ascended and descended  4 steps with R rail with Cassi 12 steps with 1 rail with SBA (non-reciprocal pattern ascending and descending) NT 8 steps with 1 rail with SBA 12 steps with 1 rail with Mod I   Curb Step:   ascended and descended TBA NT N/T 7.5 inch step with AAD and SBA 7.5 inch step with AAD and Mod I   Picking up object off the floor TBA NT NT Will  2lb weight with SB assist Will  4lb weight with Mod I   BLE ROM WNL WNL      BLE Strength RLE grossly 5/5  L hip flexion 3+/5  L knee extension 4/5  L ankle DF/PF 4/5 RLE grossly 5/5  L hip flexion 4/5  L knee extension 5/5  L ankle DF/PF 4/5      Balance  Static and dynamic standing balance Cassi with R LBQC Static and dynamic standing balance SBA with R SBQC      Date Family Teach Completed  Wife present 3/5/21      Is additional Family Teaching Needed?   Y or N Y N      Hindering Progress Hemiparesis Hemiparesis      PT recommended ELOS 3 weeks       Team's Discharge Plan        Therapist at Team Meeting          Therapeutic Exercise:   AM: Ambulating 400 feet x 1 rep with VALERIA walker and SBA  FW agility ladder with R SBQC x 2 reps and SBA  Sit <> stand from [facility] x7 reps and supervision  PM: Stepping over 4 quad canes without AD x 2 reps and CGA  Step ups onto 4\" step without UE support and CGA, x5 reps with LLE leading and x5 reps with RLE leading   Ambulating 150 feet without AD and weaving between colored floor tiles x 1 rep and CGA  Standing on Air Ex and grabbing cone with LUE from left side and stacking them on mat table on right side, x5 reps with CGA  Picking up ankle weights with LUE, FW agility ladder in NR pattern, then stepping up onto 4\" step, and placing ankle weight inside bucket without AD, x 3 reps and CGA      Patient education  Pt educated on clearing LLE during VALERIA walker ambulation     Patient response to education:   Pt verbalized understanding Pt demonstrated skill Pt requires further education in this area   yes yes yes     Additional Comments: Patient seated in [facility] upon room entry and agreeable to treatment. Progressed patients mobility device from [facility] to [facility] due to improvements in step through gait pattern. Patient performed functional activities with [facility] with ease and no LOB noted. Plan to continue mobility training with and without AD to promote improvements in balance and reciprocal gait pattern as well as incorporating activities to improve patients LUE mobility. PM: Continued plan with mobility training by incorporating functional activities without AD. Patient exhibits improvements in physical function however continues to demonstrate moderate lateral postural sway when ambulating without AD. Progressed patients step up exercise from 2\" step to 4\" step without AD. Plan to continue incorporating therapeutic exercise to promote improvements in LUE mobility and dynamic standing balance. AM  Time in: 0830  Time out: 0915    PM  Time in: 1345  Time out: 1430    Pt is making good progress toward established Physical Therapy goals. Continue with physical therapy current plan of care.     Reba Hdz, SPT  Radha Renteria, PT, DPT  [phone]

## 2021-03-09 PROCEDURE — 97530 THERAPEUTIC ACTIVITIES: CPT

## 2021-03-09 PROCEDURE — 6370000000 HC RX 637 (ALT 250 FOR IP): Performed by: INTERNAL MEDICINE

## 2021-03-09 PROCEDURE — 1280000000 HC REHAB R&B

## 2021-03-09 PROCEDURE — 97110 THERAPEUTIC EXERCISES: CPT

## 2021-03-09 PROCEDURE — 94660 CPAP INITIATION&MGMT: CPT

## 2021-03-09 PROCEDURE — 6370000000 HC RX 637 (ALT 250 FOR IP): Performed by: PHYSICAL MEDICINE & REHABILITATION

## 2021-03-09 PROCEDURE — 6360000002 HC RX W HCPCS: Performed by: INTERNAL MEDICINE

## 2021-03-09 RX ADMIN — POTASSIUM CHLORIDE 20 MEQ: 1500 TABLET, EXTENDED RELEASE ORAL at 11:32

## 2021-03-09 RX ADMIN — MESALAMINE 800 MG: 400 CAPSULE, DELAYED RELEASE ORAL at 20:10

## 2021-03-09 RX ADMIN — LEVOTHYROXINE SODIUM 75 MCG: 0.07 TABLET ORAL at 04:42

## 2021-03-09 RX ADMIN — SPIRONOLACTONE 50 MG: 25 TABLET ORAL at 08:09

## 2021-03-09 RX ADMIN — CLOPIDOGREL 75 MG: 75 TABLET, FILM COATED ORAL at 08:09

## 2021-03-09 RX ADMIN — MESALAMINE 800 MG: 400 CAPSULE, DELAYED RELEASE ORAL at 13:29

## 2021-03-09 RX ADMIN — ENOXAPARIN SODIUM 40 MG: 40 INJECTION SUBCUTANEOUS at 08:08

## 2021-03-09 RX ADMIN — METOPROLOL SUCCINATE 25 MG: 25 TABLET, EXTENDED RELEASE ORAL at 08:10

## 2021-03-09 RX ADMIN — SERTRALINE 50 MG: 50 TABLET, FILM COATED ORAL at 08:09

## 2021-03-09 RX ADMIN — LOSARTAN POTASSIUM 50 MG: 50 TABLET, FILM COATED ORAL at 08:09

## 2021-03-09 RX ADMIN — POTASSIUM CHLORIDE 20 MEQ: 1500 TABLET, EXTENDED RELEASE ORAL at 08:13

## 2021-03-09 RX ADMIN — MESALAMINE 800 MG: 400 CAPSULE, DELAYED RELEASE ORAL at 08:10

## 2021-03-09 RX ADMIN — ASPIRIN 81 MG: 81 TABLET, CHEWABLE ORAL at 08:08

## 2021-03-09 RX ADMIN — POTASSIUM CHLORIDE 20 MEQ: 1500 TABLET, EXTENDED RELEASE ORAL at 17:03

## 2021-03-09 RX ADMIN — ATORVASTATIN CALCIUM 40 MG: 40 TABLET, FILM COATED ORAL at 20:10

## 2021-03-09 ASSESSMENT — PAIN SCALES - GENERAL: PAINLEVEL_OUTOF10: 0

## 2021-03-09 NOTE — PROGRESS NOTES
Physical Therapy    Facility/Department: [address] REHAB  Daily Treatment Note    NAME: Vikas Ruiz  : 1955  MRN: [id number]    Date of Service: 3/9/2021  Evaluating Therapist: Anna Delgado PT, DPT    ROOM: [facility]  DIAGNOSIS: CVA  PRECAUTIONS: falls, L hemiparesis (UE worse than LE), sling, hx oral cancer, hx CVA in 2016 with mild chronic L hemiparesis, hx R meniscus repair in 2016, TSM, general diet  HPI: Pt is a 60year old male who developed sudden onset of left hemiparesis and was brought to [address] [place] [address]. Francoise Racer on 2021. He was not felt to be a candidate for t-PA. MRI brain not able to be done due to patient's BMI. CT perfusion showed small region of ischemic penumbra in the distribution of the R HEMA. Mobile unit MRI showed findings consistent with acute infarct in the right middle cerebral artery. He was put on antiplatelet agents. Social:  Pt is in town from [facility] with wife, visiting and staying with his daughter and her family ( and 25year old son, all work). In [facility], pt lives with wife in a 2 floor plan with 1 ANITA + 3 steps without HR to main floor. Bedroom and bathroom on first floor. Laundry in basement with full flight to reach. Wife able to provide supervision as needed. Pt may return to daughter's home which is a split level with 1 ANITA + 4 steps with R HR to reach main floor where bedroom and bathroom are located. Prior to admission: Pt ambulated with R SPC, was functionally independent. Initial Evaluation  21 AM    PM Short Term Goals Long Term Goals    Was pt agreeable to Eval/treatment? yes yes yes     Does pt have pain?  No c/o pain No c/o pain  No c/o pain     Bed Mobility  Rolling: Cassi  Supine to sit: Cassi  Sit to supine: Cassi  Scooting: Cassi Rolling: Supervision   Supine to sit: Supervision   Sit to supine: Supervision  Scooting: Supervision    (twin bed) NT SBA Mod I   Transfers Sit to stand: Cassi  Stand to sit: Cassi  Stand pivot: Cassi with R LBQC Sit to stand: Supervision   Stand to sit: Supervision  Stand pivot with SBQC with SBA   Sit to stand: Supervision   Stand to sit: Supervision  Stand pivot with SBQC with SBA SBA Mod I   Ambulation    75 feet with R LBQC with Cassi (WC follow) 400 feet x1 with R LBQC with  feet x2 rep and R SBQC with SBA  150 feet with AAD with  feet with AAD with Mod I   Walking 10 feet on uneven surface TBA NT NT 10 feet with AAD with SBA 10 feet with AAD with Mod I   Wheel Chair Mobility NA NT NT     Car Transfers Cassi with R LBQC NT NT SBA Mod I   Stair negotiation: ascended and descended  4 steps with R rail with Cassi 12 steps without rails with CGA (non-reciprocal pattern ascending and descending) 12 steps without rails with CGA (non-reciprocal pattern ascending and descending) 8 steps with 1 rail with SBA 12 steps with 1 rail with Mod I   Curb Step:   ascended and descended TBA NT N/T 7.5 inch step with AAD and SBA 7.5 inch step with AAD and Mod I   Picking up object off the floor TBA NT NT Will  2lb weight with SB assist Will  4lb weight with Mod I   BLE ROM WNL WNL      BLE Strength RLE grossly 5/5  L hip flexion 3+/5  L knee extension 4/5  L ankle DF/PF 4/5 RLE grossly 5/5  L hip flexion 4/5  L knee extension 5/5  L ankle DF/PF 4/5      Balance  Static and dynamic standing balance Cassi with R LBQC Static and dynamic standing balance SBA with R SBQC      Date Family Teach Completed  Wife present 3/5/21      Is additional Family Teaching Needed?   Y or N Y N      Hindering Progress Hemiparesis Hemiparesis      PT recommended ELOS 3 weeks       Team's Discharge Plan        Therapist at Team Meeting          Therapeutic Exercise:   AM: Sit<>Stand transfer from [facility] x 10 reps without UE use, RLE FW with SBA  Without AD, NR stepping over quad cane with LLE > retrieving cone with LUE > NR stepping over quad cane leading with LLE>stacking cone on chest height platform; x 6 reps  Continuous ambulation without AD, turning to hand to/retrieve ball from therapist on L; 400 feet x 1 rep with CGA  PM: Weaving around 3 quad canes and stepping over 2 quad canes without AD x 4 reps and SBA   Standing on Air Ex and picking up cone with LUE by reaching towards his right and stacking cone on chest height platform x 5 reps and SBA  Picking up and carrying ankle weight with LUE > FW agility ladder in reciprocal pattern > step up onto 4\" step with LLE leading > placing ankle weight inside bin placed at chest height x 4 reps and CGA     Patient education  Pt educated on implications of internally rotated LLE on dynamic standing balance and potential for inadequate LLE clearance    Patient response to education:   Pt verbalized understanding Pt demonstrated skill Pt requires further education in this area   yes partial yes     Additional Comments: Pt continues to exhibit R rotation of trunk during dynamic standing activity which becomes worse with fatigue and results in occasional catching of L toes on floor. LOB self corrected however pt requires verbal cueing to slow speed when fatigued due to this occurrence. Pt exhibits improving LUE function evidenced with improving control during item retrieval/cone stacking tasks. Pt safest with SBQC device compared to no AD, recommend continued use of this device upon discharge. PM: Patient continues to require minor cueing to promote improvements in LLE clearance during ambulation which is more evident when patient becomes fatigued. Patient exhibited lateral postural sway during agility ladder exercise resulting in minor LOB however patient able to self-correct without hands-on assistance. Plan to continue functional activities with and without AD to promote improvements in balance and physical function. AM  Time in: 0830  Time out: 0915    PM  Time in: 1515   Time out: 1600    Pt is making good progress toward established Physical Therapy goals. Continue with physical therapy current plan of care. David Hernandez, PT, DPT  [phone]

## 2021-03-09 NOTE — PROGRESS NOTES
Nutrition Assessment     Type and Reason for Visit: Reassess    Nutrition Recommendations/Plan: 1. Recommend continuing current diet as tolerated. Nutrition Assessment:  Pt admit to ARU s/p CVA continues to have po intake % of meals. Will continue to follow. Malnutrition Assessment:  Malnutrition Status: No malnutrition    Estimated Daily Nutrient Needs:  Energy (kcal): 2164; kcal; Weight Used for Energy Requirements:  Current     Protein (g): 70-90 g; Weight Used for Protein Requirements:  Ideal(1-1.2 g/kg IBW)        Fluid (ml/day):  kcal; Weight Used for Fluid Requirements:  1 ml/kcal      Nutrition Related Findings: A&Ox4, active BS,abd wdl,+BS no edema, +I/Os      Current Nutrition Therapies:    DIET GENERAL;     Anthropometric Measures:  · Height: 5' 9\" (175.3 cm)  · Current Body Wt: 305 lb (138.3 kg)(3/2)   · BMI: 45    Nutrition Diagnosis:   No nutrition diagnosis at this time     Nutrition Interventions:   Food and/or Nutrient Delivery:  Continue Current Diet  Nutrition Education/Counseling:  Education not indicated   Coordination of Nutrition Care:  Continue to monitor while inpatient    Goals:  Pt to consume >75% meals       Nutrition Monitoring and Evaluation:   Behavioral-Environmental Outcomes:  None Identified   Food/Nutrient Intake Outcomes:  Food and Nutrient Intake  Physical Signs/Symptoms Outcomes:  Nutrition Focused Physical Findings, Biochemical Data, Skin, Weight, GI Status, Fluid Status or Edema, Hemodynamic Status     Discharge Planning:    No discharge needs at this time     Electronically signed by Zaki De La Cruz RD, LD on 3/9/21 at 3:56 PM EST    Contact: [phone]

## 2021-03-09 NOTE — PROGRESS NOTES
Progress Note  Date:3/9/2021       Room:55015501-  Patient Keshawn Hernandez     YOB: 1955     Age:65 y.o. Subjective    Subjective:  Symptoms:  Stable. He reports weakness. Diet:  Adequate intake. Activity level: Impaired due to weakness. Pain:  He reports no pain. Review of Systems   Neurological: Positive for weakness. Objective         Vitals Last 24 Hours:  TEMPERATURE:  Temp  Av.6 °F (36.4 °C)  Min: 97.6 °F (36.4 °C)  Max: 97.6 °F (36.4 °C)  RESPIRATIONS RANGE: Resp  Av  Min: 18  Max: 18  PULSE OXIMETRY RANGE: SpO2  Av %  Min: 98 %  Max: 98 %  PULSE RANGE: Pulse  Av  Min: 82  Max: 82  BLOOD PRESSURE RANGE: Systolic (39JRR), NME:092 , Min:136 , SHA:903   ; Diastolic (23QEB), LSA:04, Min:84, Max:84    I/O (24Hr): Intake/Output Summary (Last 24 hours) at 3/9/2021 0729  Last data filed at 3/8/2021 1730  Gross per 24 hour   Intake 720 ml   Output    Net 720 ml     Objective:  General Appearance: In no acute distress. Vital signs: (most recent): Blood pressure 136/84, pulse 82, temperature 97.6 °F (36.4 °C), temperature source Temporal, resp. rate 18, height 5' 9\" (1.753 m), weight (!) 307 lb 4 oz (139.4 kg), SpO2 98 %. Vital signs are normal.    Output: Producing urine and producing stool. Lungs:  Normal effort and normal respiratory rate. Breath sounds clear to auscultation. Heart: Normal rate. Regular rhythm. S1 normal and S2 normal.    Abdomen: Abdomen is soft. Bowel sounds are normal.   There is no abdominal tenderness. Extremities: Normal range of motion. Neurological: Patient is alert. (4/5 str). Labs/Imaging/Diagnostics    Labs:  CBC:No results for input(s): WBC, RBC, HGB, HCT, MCV, RDW, PLT in the last 72 hours. CHEMISTRIES:  Recent Labs     21  0514      K 3.6   *   CO2 24   BUN 17   CREATININE 1.2   GLUCOSE 104*     PT/INR:No results for input(s): PROTIME, INR in the last 72 hours.   APTT:No results for input(s): APTT in the last 72 hours. LIVER PROFILE:No results for input(s): AST, ALT, BILIDIR, BILITOT, ALKPHOS in the last 72 hours. Imaging Last 24 Hours:  No results found. Assessment//Plan           Hospital Problems           Last Modified POA    Acute CVA (cerebrovascular accident) ([facility] [address] 2/23/2021 Yes        Assessment:    Condition: In stable condition. Improving.   (CVA). Plan:   Encourage ambulation. (Tolerating therapy well  Improving strength and balance  Will review progress with therapy in team today).        Electronically signed by Brant Andrade MD on 3/9/21 at 7:29 AM EST

## 2021-03-09 NOTE — PROGRESS NOTES
OCCUPATIONAL THERAPY DAILY NOTE    Date:3/9/2021  Patient Name: Meghan Escobar  MRN: [id number]  : 1955  Room: [address] [place] [address]   Referring Practitioner: Karina Berrios MD  Diagnosis: CVA- RMCA  Additional Pertinent Hx: HLD, HTN, obese & hypothyroidism    Precautions: Fall Risk, L hemiparesis    Functional Assessment:   Date Status AE  Comments   Feeding 3/8/21 Mod I     Grooming 3/7/21 SUP           Oral Care 3/7/21 Supervision     Bathing 3/7/21 UB: SBA  LB:Min A     UB Dressing 3/7/21 SBA     LB Dressing 3/7/21 Min a No AE    Footwear 3/7/21 Set up  adapted shoe laces    Toileting 3/8/21 CGA     Homemaking 3/5/21 CGA/Min A LBQC      Functional Transfers / Balance:   Date Status DME  Comments   Sit Balance 3/8/21 SUP     Stand Balance 3/8/21 SBA Grab bar    [x] Tub        [x] Shower   Transfer 3/8/21        3/7/21 SBA        CGA SBQC, large shower chair    Commode   Transfer 3/8/21 SBA SBQC    Functional   Mobility 3/8/21 SBA SBQC    Other: sit to stand 3/5/21 CGA SBQC      Functional Exercises / Activity:  Pt sitting in chair upon arrival to OT gym. Participated in armbike exercise, seated, 2x6 mins, to increase endurance for ease with ADLs. Engaged in overheard arm pulleys, 2x4 mins, to increase BUE ROM for ease with ADLs. Engaged in in hand manipulation using L hand and placing coins into slot. Pt demo'ing fair coordination and dropped coins occasionally. Engaged in therex using yellow eliza, 3x15 reps (supination/pronation) to increase BUE strength for ease with functional transfers. Engaged in [place] activity using L hand, 2x to increase coordination. Pt required increased time for demo'ing fair+ coordination. Completed 2# weighted ball toss with BUEs, with BUEs resting on inclined wedge to increase flex/strnegth/coordiantion. Pt demonstrated F+ coordination. Pt appeared to have tolerated session well.     Sensory / Neuromuscular Re-Education:      Cognitive Skills:   Status Comments   Problem   Solving good Memory good    Sequencing good    Safety fair +      Visual Perception:    Education:  Pt educated in regards to safety with LB bathing for fall prevention    3/1/21: shoe laces adapted for ease with donning shoes    [] Family teach completed on:    Pain Level: 0/10    Additional Notes:   3/2/21- Pt plan of care of updated on this date. Pt tentative length of stay is 2 weeks. Recommend family teach this week. Yaneth Magallon OTR/L [id number]  3/9/21- Pt plan of care of updated on this date. Pt tentative length of stay is 3/12/21. Recommend family teach this week & outpt OT @ dischargeSblu Martinez OTR/L [id number]      2/24/21:    Dynamometer:    R:105# & norm for pt age & gender is 89#    L:38# & norm for pt age & gender is 77#   9 hole peg:    R:36.7 seconds & norm for pt age & gender is 20.9 seconds    L: pt u/a- norm for pt age & gender is 21.6 seconds    3/4/21:    Dynamometer:    R:107.3#    L: 69.7#   9 hole peg:    R: 28.65 secs    L:3 mins 39 secs    Patient has made good  progress during treatment sessions toward set goals. Therapy emphasis to obtain goals:Strengthening, Endurance Training, Neuromuscular Re-education, Patient/Caregiver Education & Training, ROM, Cognitive Reorientation, Equipment Evaluation, Education, & procurement, Self-Care / ADL, Balance Training, Gait Training, Home Management Training, Functional Mobility Training, Safety Education & Training, Positioning     [x] Continue with current OT Plan of care.   [] Prepare for Discharge     DISCHARGE RECOMMENDATIONS  Recommended DME: extended tub bench    Post Discharge Care:   []Home Independently  []Home with 24hr Care / Supervision []Home with Partial Supervision []Home with Home Health OT []Home with Out Pt OT []Other: ___   Comments:         Time in Time out Tx Time Breakdown  Variance:   First Session  [id number] [] Individual Tx-  [x] Concurrent Tx - 45  [] Co-Tx -   [] Group Tx -   [] Time Missed -     Second Session [id number] [x] Individual Tx-15

## 2021-03-09 NOTE — PATIENT CARE CONFERENCE
supervision  Long term bed mobility goal: Modified independent    Chair/bed transfers:  Current level: standby assist-supervision with a small based quad cane  Short term Chair/bed transfers goal: met  Long term Chair/bed transfers goal: Modified independent      Ambulation:   Current level: 400 ft with a small based quad cane  at standby assist  Short term ambulation goal: met  Long term ambulation goal: 400 ft Modified independent    Car transfers:   Current level: supervision with a quad cane  Short term car transfers goal:  met  Long term car transfers goal:Modified independent    Stairs:   Current level : 12 with 1 rail at standby assist  Short term stairs goal: met  Long term stairs goal: .12 at Modified independent    Lower Extremity Strength Issues:    RLE grossly 5/5  L hip flexion 4/5  L knee extension 5/5  L ankle DF/PF 4/5  Other comments: standing balance is standby assist with a quad cane      OCCUPATIONAL THERAPY:      Tub/shower:   Current level: Contact guard assist-standby assist  Short term tub/shower goal: standby assist  Long term tub/shower goal: supervision      Feeding:  Current level: Modified independent  Short term feeding goal: Modified independent  Long term feeding goal: Modified independent    Grooming:   Current level: supervision standing at sink level  Short term grooming goal: met  Long term grooming goal: Modified independent    Bathing:  Current level: min assist for lower body, standby assist for upper body  Short term bathing goal: met  Long term bathing goal: standby assist-Contact guard assist    Homemaking:   Current level: Contact guard assist-min assist  Short term homemaking goal: met  Long term homemaking goal: min assist    Upper body dressing:  Current level: supervision  Short term upper body dressing goal: met  Long term upper body dressing goal: supervision    Lower body dressing:                                                                          Current level: min assist             Short term lower body dressing goal: Contact guard assist          Long term lower body dressing goal: standby assist            Footwear  Current level: supervision  Short term goal: me  Long term goal:supervision      Toilet transfer:   Current level: standby assist  Short term toilet transfer goal: Contact guard assist  Long term toilet transfer goal: Contact guard assist-standby assist    Other comments: doing \"in motion arm\" to left upper for sensory/neuromuscular re-education:  Pt tolerated Inmotion robotic arm intervention. Pt sat in chair with arm straps for positioning purposes. Pt used LUE & 14 cm [place]. Pt tolerated pre & post test along with random, error Augmentin 3x & fan south protocol for a total of 704 total movement & complete a maze. Pt made the following gains in his pre & post testing- smoothness improved from 0.399 to 0.419, reach error improved from 0.009 to 0.008 & initiation time decreased from 0.089 seconds to 0.006 seconds.  Pt shown his results & correlated to gains in ADLs & pt was pleased with his progress.          SPEECH THERAPY  Swallowing:  Current level:  independent  Long term swallowing Goal: met    Comprehension:   Current level: independent  Long term comprehension goal: met    Expression:   Current level: independent  Long term expression goal: met    Problem solving:   Current level: Modified independent  Short term problem solving goal: Modified independent  Long term problem solving goal: Modified independent    Memory:  Current level: Modified independent  Short term memory goal: Modified independent  Long term memory goal: Modified independent    Social interaction:  independent    Safety awareness: fair +    Comments: participates in recreation therapy and enjoys it    Patient/family's personal goals: return home  Factors supporting goal achievement:  made gains  Factors hindering goal achievement:  morbid obesity      Discharge Plan Estimated Length of Stay: 3/12/21         Destination: home to [facility] at Discharge: outpatient PT and OT  Equipment at Discharge: small based quad cane      INTERDISCIPLINARY TEAM/PHYSICIAN RECOMMENDATION AND/OR REVISIONS OF PLAN OF CARE:  Dr. Karen Richards will speak with PCP and update him on status, additional family education for day of discharge with spouse      I approve the established interdisciplinary plan of care as documented within the medical record of Vinita Duarte. Electronically signed by Berlin Mejia RN on 3/9/2021 at 8:26 AM  The following interdisciplinary team members were present:  CALE Lion RN Melville Pheasant.  Lydia Dawkins, DPT  Ethan Schwartz, [address], CCC-SLP

## 2021-03-09 NOTE — CARE COORDINATION
Shannan team: D/c 3/12 -Updated pt and pt's wife, Sarah Harvey. Pt is making gains. Worked with in motion arm (left upper). D/c goals range from stand-by assist to independent. DME - shower chair (with back), small base quad cane. Per Crystal Greco at [place] [employer] - it is ok to use [id number] since they are a Medicare provider. SW requested she inform [facility] of the same. Shower seat to be private pay. Aftercare - PT, OT (will be set up by pt's PCP in [place]). Dr. Karina Berrios plans to speak with PCP. FT - 3/5, 3/12 am    The Plan for Transition of Care is related to the following treatment goals: home with family    The Patient and/or patient representative Sarah Harvey  was provided with a choice of provider and agrees   with the discharge plan. [x] Yes [] No    Freedom of choice list was provided with basic dialogue that supports the patient's individualized plan of care/goals, treatment preferences and shares the quality data associated with the providers.  [x] Yes [] No      Rell ROBERSON Intern  Anna Adams  3/9/2021

## 2021-03-10 PROCEDURE — 97530 THERAPEUTIC ACTIVITIES: CPT

## 2021-03-10 PROCEDURE — 94660 CPAP INITIATION&MGMT: CPT

## 2021-03-10 PROCEDURE — 1280000000 HC REHAB R&B

## 2021-03-10 PROCEDURE — 6370000000 HC RX 637 (ALT 250 FOR IP): Performed by: INTERNAL MEDICINE

## 2021-03-10 PROCEDURE — 97535 SELF CARE MNGMENT TRAINING: CPT

## 2021-03-10 PROCEDURE — 97110 THERAPEUTIC EXERCISES: CPT

## 2021-03-10 PROCEDURE — 6360000002 HC RX W HCPCS: Performed by: INTERNAL MEDICINE

## 2021-03-10 PROCEDURE — 6370000000 HC RX 637 (ALT 250 FOR IP): Performed by: PHYSICAL MEDICINE & REHABILITATION

## 2021-03-10 RX ADMIN — MESALAMINE 800 MG: 400 CAPSULE, DELAYED RELEASE ORAL at 07:36

## 2021-03-10 RX ADMIN — LOSARTAN POTASSIUM 50 MG: 50 TABLET, FILM COATED ORAL at 07:37

## 2021-03-10 RX ADMIN — POTASSIUM CHLORIDE 20 MEQ: 1500 TABLET, EXTENDED RELEASE ORAL at 16:37

## 2021-03-10 RX ADMIN — LEVOTHYROXINE SODIUM 75 MCG: 0.07 TABLET ORAL at 06:15

## 2021-03-10 RX ADMIN — POTASSIUM CHLORIDE 20 MEQ: 1500 TABLET, EXTENDED RELEASE ORAL at 11:49

## 2021-03-10 RX ADMIN — ATORVASTATIN CALCIUM 40 MG: 40 TABLET, FILM COATED ORAL at 20:31

## 2021-03-10 RX ADMIN — SERTRALINE 50 MG: 50 TABLET, FILM COATED ORAL at 07:36

## 2021-03-10 RX ADMIN — SPIRONOLACTONE 50 MG: 25 TABLET ORAL at 07:37

## 2021-03-10 RX ADMIN — MESALAMINE 800 MG: 400 CAPSULE, DELAYED RELEASE ORAL at 13:38

## 2021-03-10 RX ADMIN — POTASSIUM CHLORIDE 20 MEQ: 1500 TABLET, EXTENDED RELEASE ORAL at 07:37

## 2021-03-10 RX ADMIN — CLOPIDOGREL 75 MG: 75 TABLET, FILM COATED ORAL at 07:37

## 2021-03-10 RX ADMIN — ENOXAPARIN SODIUM 40 MG: 40 INJECTION SUBCUTANEOUS at 07:36

## 2021-03-10 RX ADMIN — ASPIRIN 81 MG: 81 TABLET, CHEWABLE ORAL at 07:36

## 2021-03-10 RX ADMIN — MESALAMINE 800 MG: 400 CAPSULE, DELAYED RELEASE ORAL at 20:31

## 2021-03-10 RX ADMIN — METOPROLOL SUCCINATE 25 MG: 25 TABLET, EXTENDED RELEASE ORAL at 07:37

## 2021-03-10 NOTE — PROGRESS NOTES
Physical Therapy    Facility/Department: [address] REHAB  Daily Treatment Note    NAME: Theresa Ellison  : 1955  MRN: [id number]    Date of Service: 3/10/2021  Evaluating Therapist: Brooklynn Lux PT, DPT    ROOM: [facility]  DIAGNOSIS: CVA  PRECAUTIONS: falls, L hemiparesis (UE worse than LE), sling, hx oral cancer, hx CVA in 2016 with mild chronic L hemiparesis, hx R meniscus repair in 2016, TSM, general diet  HPI: Pt is a 60year old male who developed sudden onset of left hemiparesis and was brought to [facility]. Jasbir Ames on 2021. He was not felt to be a candidate for t-PA. MRI brain not able to be done due to patient's BMI. CT perfusion showed small region of ischemic penumbra in the distribution of the R HEMA. Mobile unit MRI showed findings consistent with acute infarct in the right middle cerebral artery. He was put on antiplatelet agents. Social:  Pt is in town from [facility] with wife, visiting and staying with his daughter and her family ( and 25year old son, all work). In [facility], pt lives with wife in a 2 floor plan with 1 ANITA + 3 steps without HR to main floor. Bedroom and bathroom on first floor. Laundry in basement with full flight to reach. Wife able to provide supervision as needed. Pt may return to daughter's home which is a split level with 1 ANITA + 4 steps with R HR to reach main floor where bedroom and bathroom are located. Prior to admission: Pt ambulated with R SPC, was functionally independent. Initial Evaluation  21 AM    PM Short Term Goals Long Term Goals    Was pt agreeable to Eval/treatment? yes yes yes     Does pt have pain?  No c/o pain No c/o pain  No c/o pain     Bed Mobility  Rolling: Cassi  Supine to sit: Cassi  Sit to supine: Cassi  Scooting: Cassi N/T NT SBA Mod I   Transfers Sit to stand: Cassi  Stand to sit: Cassi  Stand pivot: Cassi with R LBQC Sit to stand: Supervision   Stand to sit: Supervision  Stand pivot with SBQC with SBA   Sit to stand: Supervision   Stand to sit: Supervision  Stand pivot with SBQC with SBA SBA Mod I   Ambulation    75 feet with R LBQC with Cassi (WC follow) 400 feet x1 with R LBQC with  feet x2 rep and R SBQC with SBA  150 feet with AAD with  feet with AAD with Mod I   Walking 10 feet on uneven surface TBA NT NT 10 feet with AAD with SBA 10 feet with AAD with Mod I   Wheel Chair Mobility NA NT NT     Car Transfers Cassi with R LBQC SBA NT SBA Mod I   Stair negotiation: ascended and descended  4 steps with R rail with Cassi N/T 12 steps with rail with CGA (non-reciprocal pattern ascending and descending) 8 steps with 1 rail with SBA 12 steps with 1 rail with Mod I   Curb Step:   ascended and descended TBA NT N/T 7.5 inch step with AAD and SBA 7.5 inch step with AAD and Mod I   Picking up object off the floor TBA NT NT Will  2lb weight with SB assist Will  4lb weight with Mod I   BLE ROM WNL WNL      BLE Strength RLE grossly 5/5  L hip flexion 3+/5  L knee extension 4/5  L ankle DF/PF 4/5 RLE grossly 5/5  L hip flexion 4/5  L knee extension 5/5  L ankle DF/PF 4/5      Balance  Static and dynamic standing balance Cassi with R LBQC Static and dynamic standing balance SBA with R SBQC      Date Family Teach Completed  Wife present 3/5/21      Is additional Family Teaching Needed? Y or N Y N      Hindering Progress Hemiparesis Hemiparesis      PT recommended ELOS 3 weeks       Team's Discharge Plan        Therapist at Team Meeting          Therapeutic Exercise:   AM: Sit<>Stand transfer from Entegrion x 10 reps without UE use, amb forward and backward 10 feet x4 reps, amb with multiple direction changes. PM:sidestepping x4 reps, stepping over quad canes x4 reps, toe taps on cone L LE x5 reps.        Patient education  Pt educated on implications of internally rotated LLE on dynamic standing balance and potential for inadequate LLE clearance    Patient response to education:   Pt verbalized understanding Pt demonstrated skill Pt requires further education in this area   yes partial yes     Additional Comments: Occ cues still to clear L foot during amb without A.D. Cont to work on challenging pt's balance. No LOB noted during amb. Pt has increased lateral sway when amb without A.D. Pt requires cues occ to slow down during amb especially when fatigued. Pt returned to room at end of tx sessions. Pt given call light. AM  Time in: 0830  Time out: 0915    PM  Time in: 1515   Time out: 1600    Pt is making good progress toward established Physical Therapy goals. Continue with physical therapy current plan of care.     Med Quiroz [id number]

## 2021-03-10 NOTE — PROGRESS NOTES
Progress Note  Date:3/10/2021       Room:5501/5501-B  Patient Mell Weeks     YOB: 1955     Age:65 y.o. Subjective    Subjective:  Symptoms:  Stable. He reports weakness. Diet:  Adequate intake. Activity level: Impaired due to weakness. Pain:  He reports no pain. Review of Systems   Neurological: Positive for weakness. Objective         Vitals Last 24 Hours:  TEMPERATURE:  No data recorded  RESPIRATIONS RANGE: No data recorded  PULSE OXIMETRY RANGE: No data recorded  PULSE RANGE: No data recorded  BLOOD PRESSURE RANGE: No data recorded.  ; No data recorded. I/O (24Hr): Intake/Output Summary (Last 24 hours) at 3/10/2021 0703  Last data filed at 3/9/2021 2030  Gross per 24 hour   Intake 720 ml   Output    Net 720 ml     Objective:  General Appearance: In no acute distress. Vital signs: (most recent): Blood pressure 136/84, pulse 82, temperature 97.6 °F (36.4 °C), temperature source Temporal, resp. rate 18, height 5' 9\" (1.753 m), weight (!) 307 lb 4 oz (139.4 kg), SpO2 98 %. Vital signs are normal.    Output: Producing urine and producing stool. Lungs:  Normal effort and normal respiratory rate. Breath sounds clear to auscultation. Heart: Normal rate. Regular rhythm. S1 normal and S2 normal.    Abdomen: Abdomen is soft. Bowel sounds are normal.   There is no abdominal tenderness. Extremities: Normal range of motion. Neurological: Patient is alert. (4/5 strength). Labs/Imaging/Diagnostics    Labs:  CBC:No results for input(s): WBC, RBC, HGB, HCT, MCV, RDW, PLT in the last 72 hours. CHEMISTRIES:  Recent Labs     03/08/21  0514      K 3.6   *   CO2 24   BUN 17   CREATININE 1.2   GLUCOSE 104*     PT/INR:No results for input(s): PROTIME, INR in the last 72 hours. APTT:No results for input(s): APTT in the last 72 hours. LIVER PROFILE:No results for input(s): AST, ALT, BILIDIR, BILITOT, ALKPHOS in the last 72 hours.     Imaging Last

## 2021-03-10 NOTE — PROGRESS NOTES
Progress Note  3/10/2021 12:43 PM  Subjective:   Admit Date: 2/23/2021  PCP: No primary care provider on file. Interval History:  3/10: Seen in room, sleeping in wheelchair. States he is feeling well, just tired today. Diet: DIET GENERAL;    Data:   Scheduled Meds:   potassium chloride  20 mEq Oral TID WC    enoxaparin  40 mg Subcutaneous Daily    aspirin  81 mg Oral Daily    atorvastatin  40 mg Oral Nightly    clopidogrel  75 mg Oral Daily    levothyroxine  75 mcg Oral Daily    losartan  50 mg Oral Daily    mesalamine  800 mg Oral TID    metoprolol succinate  25 mg Oral Daily    sertraline  50 mg Oral Daily    spironolactone  50 mg Oral Daily     Continuous Infusions:  PRN Meds:acetaminophen **OR** acetaminophen, promethazine **OR** ondansetron, acetaminophen, polyethylene glycol  I/O last 3 completed shifts: In: 720 [P.O.:720]  Out: -   I/O this shift:  In: 180 [P.O.:180]  Out: -     Intake/Output Summary (Last 24 hours) at 3/10/2021 1243  Last data filed at 3/10/2021 0907  Gross per 24 hour   Intake 720 ml   Output    Net 720 ml     CBC: No results for input(s): WBC, HGB, PLT in the last 72 hours. BMP:    Recent Labs     03/08/21  0514      K 3.6   *   CO2 24   BUN 17   CREATININE 1.2   GLUCOSE 104*     Hepatic: No results for input(s): AST, ALT, ALB, BILITOT, ALKPHOS in the last 72 hours. Troponin: No results for input(s): TROPONINI in the last 72 hours. BNP: No results for input(s): BNP in the last 72 hours. Lipids: No results for input(s): CHOL, HDL in the last 72 hours. Invalid input(s): LDLCALCU  ABGs: No results found for: PHART, PO2ART, GBX1MYJ  INR: No results for input(s): INR in the last 72 hours.     -----------------------------------------------------------------  RAD: Echo Complete    Result Date: 2/23/2021  Transthoracic Echocardiography Report (TTE)  Demographics   Patient Name         Curly Kim   Gender                Male   Medical Record       [id number]    Room Number           5330  Number   Account #            [de-identified]   Procedure Date        02/23/2021   Corporate ID                     Ordering Physician    Jian Coronado   Accession Number     [id number]  Referring Physician   Date of Birth        1955  Sonographer           Sushila POPE   Age                  60 year(s)  Interpreting          Jacoby Olivera MD                                   Physician                                    Any Other  Procedure Type of Study   TTE procedure:Echo W/Bubble Study. Procedure Date Date: 02/23/2021 Start: 11:50 AM Study Location: Portable Technical Quality: Limited visualization due to body habitus. Indications:CVA. Patient Status: Pending Discharge Height: 69 inches Weight: 320 pounds BSA: 2.52 m^2 BMI: 47.26 kg/m^2 Rhythm: Within normal limits HR: 81 bpm BP: 164/93 mmHg  Findings   Left Ventricle  Left ventricle size is normal.  Concentric left ventricular hypertrophy. Normal left ventricular systolic function. Ejection fraction is visually estimated at 60%. There is doppler evidence of stage I diastolic dysfunction. Right Ventricle  Normal right ventricular size and function (TAPSE 2.5 cm). Left Atrium  Normal sized left atrium by volume index. No apparent interatrial shunting on bubble study (suboptimal  visualization). Right Atrium  Normal sized right atrium. Mitral Valve  Physiologic and/or trace mitral regurgitation. No evidence of hemodynamically significant mitral stenosis. Tricuspid Valve  Physiologic and/or trace tricuspid regurgitation. Aortic Valve  No evidence of hemodynamically significant aortic regurgitation or  stenosis. Pulmonic Valve  The pulmonic valve was not well visualized. Pericardial Effusion  No evidence of a hemodynamically significant pericardial effusion. Aorta  Aortic root dimension within normal limits. Conclusions   Summary  Normal left ventricular systolic function. Ejection fraction is visually estimated at 60%. Normal right ventricular size and function (TAPSE 2.5 cm). There is doppler evidence of stage I diastolic dysfunction. No apparent interatrial shunting on bubble study (suboptimal  visualization). Signature   ----------------------------------------------------------------  Electronically signed by Dominic Soares MD(Interpreting  physician) on 02/23/2021 01:41 PM  ----------------------------------------------------------------  M-Mode/2D Measurements & Calculations   LV Diastolic     LV Systolic Dimension: 3 cm     AV Cusp Separation: 2.1  Dimension: 4.8   LV Volume Diastolic: 913.6 ml   cmAO Root Dimension: 3.5  cm               LV Volume Systolic: 85.0 ml     cm  LV FS:37.5 %     LV EDV/LV EDV Index: 779.6  LV PW Diastolic: DF/10 CA/Q^1SX ESV/LV ESV  1.1 cm           Index: 35.2 ml/14ml/ m^2  LV PW Systolic:  EF Calculated: 67 %             RV Diastolic Dimension:  1.6 cm           LV Mass Index: 98 l/min*m^2     2.7 cm  Septum           LV Length: 9 cm  Diastolic: 1.5                                   Ascending Aorta: 3.6 cm  cm               LVOT: 2.5 cm                    LA volume/Index: 52.1 ml  Septum Systolic:                                 /20.68ml/m^2  1. 6 cm                                           RA Area: 11.9 cm^2  CO: 5.96 l/min  CI: 2.37 l/m*m^2  LV Mass: 245.73  g  Doppler Measurements & Calculations   MV Peak E-Wave:    AV Peak Velocity: 1.35 m/s      LVOT Peak Velocity:  0.61 m/s           AV Peak Gradient: 7.3 mmHg      0.78 m/s  MV Peak A-Wave:    AV Mean Velocity: 1.02 m/s      LVOT Mean Velocity:  1.09 m/s           AV Mean Gradient: 4.5 mmHg      0.54 m/s  MV E/A Ratio: 0.56 AV VTI: 25.6 cm                 LVOT Peak Gradient: 2.4  MV Peak Gradient:  AV Area (Continuity):2.87 cm^2  mmHgLVOT Mean Gradient:  4.4 mmHg                                           1.3 mmHg  MV Mean Gradient:  LVOT VTI: 15 cm  1.9 mmHg  MV Mean Velocity:  0.63 m/s Pulm. Vein A Reversal  MV Deceleration    Duration:163.8 msec  Time: 295.3 msec   Pulm. Vein D Velocity:0.53  MV P1/2t: 79.9     m/sPulm. Vein A Reversal  msec               Velocity:0.38 m/s  MVA by PHT:2.75    Pulm. Vein S Velocity: 0.83 m/s  cm^2  MV Area  (continuity): 3.6  cm^2  MV E' Septal  Velocity: 0.08 m/s  MV E' Lateral  Velocity: 7 m/s  http://[email or web address]? [id number]      Objective:   Vitals: /82   Pulse 78   Temp 97 °F (36.1 °C) (Temporal)   Resp 17   Ht 5' 9\" (1.753 m)   Wt (!) 307 lb 4 oz (139.4 kg)   SpO2 98%   BMI 45.37 kg/m²   General appearance: appears stated age   Skin:  No rashes or lesions  HEENT: Head: Normocephalic, no lesions, without obvious abnormality. Neck: no adenopathy, no carotid bruit, no JVD, supple, symmetrical, trachea midline and thyroid not enlarged, symmetric, no tenderness/mass/nodules  Lungs: clear to auscultation bilaterally  Heart: regular rate and rhythm, S1, S2 normal, no murmur, click, rub or gallop  Abdomen: soft, non-tender; bowel sounds normal; no masses,  no organomegaly  Extremities: extremities normal, atraumatic, no cyanosis or edema  Neurologic: Mental status: Alert, oriented, thought content appropriate    Assessment:   Patient Active Problem List:     Acute CVA (cerebrovascular accident) ([address]    Plan:   IMPRESSION/RECOMMENDATIONS:     1. Hypokalemia on replacement K   Chronic since 2009 per wife  Continue potassium supplement  Continue aldactone     2. Hypertension  BP at goal  Follow     3. Sp cva   r mca cva  Undergoing acute rehab  Left sided weakness     Potassium and BP well controlled. We will follow the patient PRN.     FIDENCIO De La Cruz NP     Patient examined in PT  Excellent recovery of left sided weakness   No edema , hypokalemia better   Plan as above   Dr Michele Gillette

## 2021-03-10 NOTE — PROGRESS NOTES
OCCUPATIONAL THERAPY DAILY NOTE    Date:3/10/2021  Patient Name: Vilma Jiang  MRN: [id number]  : 1955  Room: [address] [place] [address]   Referring Practitioner: Tray Barillas MD  Diagnosis: CVA- RMCA  Additional Pertinent Hx: HLD, HTN, obese & hypothyroidism    Precautions: Fall Risk, L hemiparesis    Functional Assessment:   Date Status AE  Comments   Feeding 3/10/21 Mod I  Pt opened packages and self fed   Grooming 3/10/21 Mod I  Pt completed hair combing         Oral Care 3/10/21 Mod I  Seated for oral hygiene   Bathing 3/10/21 UB: SBA  LB:SBA  Pt completed full shower seated/standing. Pt able to stand to wash and dry all parts   UB Dressing 3/10/21 Set-up  Pt donned pullover shirt with increased time to pull shirt down L side in the back   LB Dressing 3/10/21 SBA No AE Pt able to cherri underwear and pants with increased time   Footwear 3/10/21 Set up  adapted shoe laces Pt donned B socks/shoes   Toileting 3/10/21 SBA  Pt able to complete keyon hygiene and pants management   Homemaking 3/5/21 CGA/Min A LBQC      Functional Transfers / Balance:   Date Status DME  Comments   Sit Balance 3/10/21 SUP     Stand Balance 3/10/21 SBA Grab bar    [x] Tub        [x] Shower   Transfer 3/8/21        3/10/21 SBA        SBA SBQC, large shower chair         Step in method into walk in shower   Commode   Transfer 3/10/21 SBA SBQC    Functional   Mobility 3/10/21 SBA SBQC From OT gym back to    Other: sit to stand 3/5/21 CGA SBQC      Functional Exercises / Activity:  Pt supine in bed upon arrival to  Mod I for supine-sit EOB and SBA for SPT bed-w/c. Completed ADL, see above for assessment. Pt appeared to have tolerated session well and has increased independence/strength/coordination with use of L side with ADLs. Pt siting in chair upon arrival to OT gym in PM. Engaged in Fosterview using 2# wrist weight, 3x3 up/downs to increase BUE strength for ease with functional transfers.  Engaged in using juxaciser 2x3 reps with L hand to increase ROM and strength for ease with ADLs. Pt dmeo'ing F coordination and increased time. Engaged in placing/removing pieces of Purdue Peg board to increase L hand fine motor coordination. Completed using key, placing into door, and opening door with L hand. Pt able to complete with no physical assist.     Sensory / Neuromuscular Re-Education:      Cognitive Skills:   Status Comments   Problem   Solving good    Memory good    Sequencing good    Safety Good-      Visual Perception:    Education:  Pt educated in regards to safety with LB bathing for fall prevention    3/1/21: shoe laces adapted for ease with donning shoes    [] Family teach completed on:    Pain Level: 0/10    Additional Notes:   3/2/21- Pt plan of care of updated on this date. Pt tentative length of stay is 2 weeks. Recommend family teach this week. Susan Michael OTR/L [id number]  3/9/21- Pt plan of care of updated on this date. Pt tentative length of stay is 3/12/21. Recommend family teach this week & outpt OT @ dischargeVeronica Marcell OTR/L [id number]      2/24/21:    Dynamometer:    R:105# & norm for pt age & gender is 89#    L:38# & norm for pt age & gender is 77#   9 hole peg:    R:36.7 seconds & norm for pt age & gender is 20.9 seconds    L: pt u/a- norm for pt age & gender is 21.6 seconds    3/4/21:    Dynamometer:    R:107.3#    L: 69.7#   9 hole peg:    R: 28.65 secs    L:3 mins 39 secs    Patient has made good  progress during treatment sessions toward set goals. Therapy emphasis to obtain goals:Strengthening, Endurance Training, Neuromuscular Re-education, Patient/Caregiver Education & Training, ROM, Cognitive Reorientation, Equipment Evaluation, Education, & procurement, Self-Care / ADL, Balance Training, Gait Training, Home Management Training, Functional Mobility Training, Safety Education & Training, Positioning     [x] Continue with current OT Plan of care.   [] Prepare for Discharge     DISCHARGE RECOMMENDATIONS  Recommended DME: extended tub bench Post Discharge Care:   []Home Independently  []Home with 24hr Care / Supervision []Home with Partial Supervision []Home with Home Health OT []Home with Out Pt OT []Other: ___   Comments:         Time in Time out Tx Time Breakdown  Variance:   First Session  0700 0745 [x] Individual Tx-45  [] Concurrent Tx -   [] Co-Tx -   [] Group Tx -   [] Time Missed -     Second Session [id number] [] Individual Tx-   [x] Concurrent Tx -45  [] Co-Tx -   [] Group Tx -   [] Time Missed -     Third Session    [] Individual Tx-  [] Concurrent Tx -  [] Co-Tx -   [] Group Tx -   [] Time Missed -         Total Tx Time: Nidia [address], [facility], OTR/L [id number]

## 2021-03-10 NOTE — PROGRESS NOTES
DISCHARGE SUMMARY    Group interaction skills/socialization:  Attila Caceres displayed social interaction skills at the [facility]. Leisure participation/awareness:  Attila Caceres participated in 1 therapeutic recreation interventions identifying 2 benefits to leisure participation.     Other:     Outcomes: goals achieved      Electronically signed by Shayy Tineo on 3/10/2021 at 1:19 PM

## 2021-03-11 LAB
ANION GAP SERPL CALCULATED.3IONS-SCNC: 10 MMOL/L (ref 7–16)
BUN BLDV-MCNC: 14 MG/DL (ref 8–23)
CALCIUM SERPL-MCNC: 9.3 MG/DL (ref 8.6–10.2)
CHLORIDE BLD-SCNC: 107 MMOL/L (ref 98–107)
CO2: 24 MMOL/L (ref 22–29)
CREAT SERPL-MCNC: 1.2 MG/DL (ref 0.7–1.2)
GFR AFRICAN AMERICAN: >60
GFR NON-AFRICAN AMERICAN: >60 ML/MIN/1.73
GLUCOSE BLD-MCNC: 104 MG/DL (ref 74–99)
POTASSIUM SERPL-SCNC: 3.5 MMOL/L (ref 3.5–5)
SODIUM BLD-SCNC: 141 MMOL/L (ref 132–146)

## 2021-03-11 PROCEDURE — 97110 THERAPEUTIC EXERCISES: CPT

## 2021-03-11 PROCEDURE — 97112 NEUROMUSCULAR REEDUCATION: CPT

## 2021-03-11 PROCEDURE — 6360000002 HC RX W HCPCS: Performed by: INTERNAL MEDICINE

## 2021-03-11 PROCEDURE — 80048 BASIC METABOLIC PNL TOTAL CA: CPT

## 2021-03-11 PROCEDURE — 36415 COLL VENOUS BLD VENIPUNCTURE: CPT

## 2021-03-11 PROCEDURE — 97530 THERAPEUTIC ACTIVITIES: CPT

## 2021-03-11 PROCEDURE — 6370000000 HC RX 637 (ALT 250 FOR IP): Performed by: PHYSICAL MEDICINE & REHABILITATION

## 2021-03-11 PROCEDURE — 94660 CPAP INITIATION&MGMT: CPT

## 2021-03-11 PROCEDURE — 1280000000 HC REHAB R&B

## 2021-03-11 PROCEDURE — 6370000000 HC RX 637 (ALT 250 FOR IP): Performed by: INTERNAL MEDICINE

## 2021-03-11 RX ADMIN — LOSARTAN POTASSIUM 50 MG: 50 TABLET, FILM COATED ORAL at 07:47

## 2021-03-11 RX ADMIN — ENOXAPARIN SODIUM 40 MG: 40 INJECTION SUBCUTANEOUS at 07:47

## 2021-03-11 RX ADMIN — SPIRONOLACTONE 50 MG: 25 TABLET ORAL at 07:47

## 2021-03-11 RX ADMIN — MESALAMINE 800 MG: 400 CAPSULE, DELAYED RELEASE ORAL at 14:36

## 2021-03-11 RX ADMIN — ATORVASTATIN CALCIUM 40 MG: 40 TABLET, FILM COATED ORAL at 20:26

## 2021-03-11 RX ADMIN — CLOPIDOGREL 75 MG: 75 TABLET, FILM COATED ORAL at 07:47

## 2021-03-11 RX ADMIN — POTASSIUM CHLORIDE 20 MEQ: 1500 TABLET, EXTENDED RELEASE ORAL at 11:57

## 2021-03-11 RX ADMIN — ASPIRIN 81 MG: 81 TABLET, CHEWABLE ORAL at 07:47

## 2021-03-11 RX ADMIN — POTASSIUM CHLORIDE 20 MEQ: 1500 TABLET, EXTENDED RELEASE ORAL at 07:47

## 2021-03-11 RX ADMIN — POTASSIUM CHLORIDE 20 MEQ: 1500 TABLET, EXTENDED RELEASE ORAL at 16:16

## 2021-03-11 RX ADMIN — METOPROLOL SUCCINATE 25 MG: 25 TABLET, EXTENDED RELEASE ORAL at 07:47

## 2021-03-11 RX ADMIN — SERTRALINE 50 MG: 50 TABLET, FILM COATED ORAL at 07:47

## 2021-03-11 RX ADMIN — LEVOTHYROXINE SODIUM 75 MCG: 0.07 TABLET ORAL at 06:06

## 2021-03-11 RX ADMIN — MESALAMINE 800 MG: 400 CAPSULE, DELAYED RELEASE ORAL at 07:47

## 2021-03-11 RX ADMIN — MESALAMINE 800 MG: 400 CAPSULE, DELAYED RELEASE ORAL at 20:26

## 2021-03-11 ASSESSMENT — PAIN SCALES - GENERAL: PAINLEVEL_OUTOF10: 0

## 2021-03-11 NOTE — PROGRESS NOTES
Progress Note  Date:3/11/2021       Room:5501/5501-B  Patient Rama Goldberg     YOB: 1955     Age:65 y.o. Subjective    Subjective:  Symptoms:  Stable. He reports weakness. Diet:  Adequate intake. Activity level: Impaired due to weakness. Pain:  He reports no pain. Review of Systems   Neurological: Positive for weakness. Objective         Vitals Last 24 Hours:  TEMPERATURE:  No data recorded  RESPIRATIONS RANGE: No data recorded  PULSE OXIMETRY RANGE: No data recorded  PULSE RANGE: Pulse  Av  Min: 81  Max: 81  BLOOD PRESSURE RANGE: Systolic (69QOZ), UHH:104 , Min:129 , ZWN:371   ; Diastolic (44XJP), YZD:78, Min:71, Max:71    I/O (24Hr): Intake/Output Summary (Last 24 hours) at 3/11/2021 0824  Last data filed at 3/10/2021 1854  Gross per 24 hour   Intake 900 ml   Output    Net 900 ml     Objective:  General Appearance: In no acute distress. Vital signs: (most recent): Blood pressure 129/71, pulse 81, temperature 97 °F (36.1 °C), temperature source Temporal, resp. rate 17, height 5' 9\" (1.753 m), weight (!) 307 lb 4 oz (139.4 kg), SpO2 98 %. Vital signs are normal.    Output: Producing urine and producing stool. Lungs:  Normal effort and normal respiratory rate. Breath sounds clear to auscultation. Heart: Normal rate. Regular rhythm. S1 normal and S2 normal.    Abdomen: Abdomen is soft. Bowel sounds are normal.   There is no abdominal tenderness. Extremities: Normal range of motion. Neurological: Patient is alert. (4/5 strength). Labs/Imaging/Diagnostics    Labs:  CBC:No results for input(s): WBC, RBC, HGB, HCT, MCV, RDW, PLT in the last 72 hours. CHEMISTRIES:  Recent Labs     21  0522      K 3.5      CO2 24   BUN 14   CREATININE 1.2   GLUCOSE 104*     PT/INR:No results for input(s): PROTIME, INR in the last 72 hours. APTT:No results for input(s): APTT in the last 72 hours.   LIVER PROFILE:No results for input(s): AST, ALT, BILIDIR, BILITOT, ALKPHOS in the last 72 hours. Imaging Last 24 Hours:  No results found. Assessment//Plan           Hospital Problems           Last Modified POA    Acute CVA (cerebrovascular accident) ([facility] [address] 2/23/2021 Yes        Assessment:    Condition: In stable condition. Improving.   (CVA). Plan:   Encourage ambulation. (Has made good gains in therapy  Improving strength and balance  Family teaching tomorrow followed by discharge).        Electronically signed by Edwardo Patel MD on 3/11/21 at 8:24 AM EST

## 2021-03-11 NOTE — PLAN OF CARE
Problem: Falls - Risk of:  Goal: Will remain free from falls  Description: Will remain free from falls  Outcome: Met This Shift  Goal: Absence of physical injury  Description: Absence of physical injury  Outcome: Met This Shift     Problem: Skin Integrity:  Goal: Will show no infection signs and symptoms  Description: Will show no infection signs and symptoms  Outcome: Met This Shift  Goal: Absence of new skin breakdown  Description: Absence of new skin breakdown  Outcome: Met This Shift     Problem: Neurological  Goal: Maximum potential motor/sensory/cognitive function  Outcome: Met This Shift     Problem: HEMODYNAMIC STATUS  Goal: Patient has stable vital signs and fluid balance  Outcome: Met This Shift     Problem: ACTIVITY INTOLERANCE/IMPAIRED MOBILITY  Goal: Mobility/activity is maintained at optimum level for patient  Outcome: Met This Shift     Problem: COMMUNICATION IMPAIRMENT  Goal: Ability to express needs and understand communication  Outcome: Met This Shift

## 2021-03-11 NOTE — PROGRESS NOTES
OCCUPATIONAL THERAPY DAILY NOTE    Date:3/11/2021  Patient Name: Bladimir Espinosa  MRN: [id number]  : 1955  Room: [address] [place] [address]   Referring Practitioner: Cheri Magallon MD  Diagnosis: CVA- RMCA  Additional Pertinent Hx: HLD, HTN, obese & hypothyroidism    Precautions: Fall Risk, L hemiparesis, 3/11 green dot    Functional Assessment:   Date Status AE  Comments   Feeding 3/10/21 Mod I     Grooming 3/10/21 Mod I           Oral Care 3/10/21 Mod I     Bathing 3/10/21 UB: SBA  LB:SBA     UB Dressing 3/10/21 Set-up     LB Dressing 3/11/21 Mod I No AE Pt able to cherri pants   Footwear 3/11/21 Mod I  adapted shoe laces Pt able to cherri socks/shoes   Toileting 3/11/21 Mod I  Pt able to complete pants management and keyon hygiene   Homemaking 3/5/21 CGA/Min A [facility]      Functional Transfers / Balance:   Date Status DME  Comments   Sit Balance 3/11/21 Mod I     Stand Balance 3/11/21 Mod I Grab bar    [x] Tub        [x] Shower   Transfer 3/8/21        3/10/21 SBA        SBA SBQC, large shower chair    Commode   Transfer 3/11/21 Mod I [facility]    Functional   Mobility 3/11/21 Mod I SBQC To/from bathroom   Other: sit to stand 3/5/21 CGA SBQC      Functional Exercises / Activity:  Pt sitting in chair upon arrival to OT gym. Participated in armbike exercise, seated, 2x6 mins, to increase endurance for ease with ADLs. Engaged in therex, seated, using 3.5#dowel chaka, 3x15 reps (elbow/shoulder flex), to increase BUE strength for ease with functional transfers. Engaged in therex of towel slides (R,L,forward) to increase BUE ROM, 3x15 reps shoulder flex/ab/aduction. Pt completed folding of towels to increase ROM and B hand coordination. Pt demo'ing G- coordination and increased time. Engaged in [address], standing, to increase standing balance/tolerance, LUE ROM/strength, and hand eye coordination. Pt able to reach, toss, use reacher to  beans bags, and carry ~5lb bucket while ambulating back to chair. Pt able to complete 2 sets at SBA. Sensory / Neuromuscular Re-Education:  Pt tolerated [employer] arm reassessment, pre & post testing & random protocol to improve neurological recovery of his LUE. Pt initial evaluation was on 3/1/21. Pt made the following improvement: smoothness improved from 0.303 to 0.458, reach error decreased from 0.050 to 0.023, path error decreased from 0.013 to 0.009, initiation time improved from 0.060 sec to 0.004 sec, [place] independence improved from 0.571 to 0.845, hold deviation decreased from 0.061 to 0.030 & displacement improved from 0.113 to 0.131. Pt shown his results & correlated his improvements in his ADLs to his neurological gains & pt was pleased with his progress. Cognitive Skills:   Status Comments   Problem   Solving good    Memory good    Sequencing good    Safety Good-      Visual Perception:    Education:  Pt educated in regards to safety with LB bathing for fall prevention    3/1/21: shoe laces adapted for ease with donning shoes    [] Family teach completed on:    Pain Level: 0/10    Additional Notes:   3/2/21- Pt plan of care of updated on this date. Pt tentative length of stay is 2 weeks. Recommend family teach this week. Chuck Garrison OTR/L [id number]  3/9/21- Pt plan of care of updated on this date. Pt tentative length of stay is 3/12/21. [facility] family teach this week [id number]  3/11/21: collaborated with PT about green dot for pt. Pt demonstrates independence with LB dressing and toileting task and was administered green dot. 2/24/21:    Dynamometer:    R:105# & norm for pt age & gender is 89#    L:38# & norm for pt age & gender is 77#   9 hole peg:    R:36.7 seconds & norm for pt age & gender is 20.9 seconds    L: pt u/a- norm for pt age & gender is 21.6 seconds    3/4/21:    Dynamometer:    R:107.3#    L: 69.7#   9 hole peg:    R: 28.65 secs    L:3 mins 39 secs    Patient has made good  progress during treatment sessions toward set goals.  Therapy emphasis to obtain goals:Strengthening, Endurance Training, Neuromuscular Re-education, Patient/Caregiver Education & Training, ROM, Cognitive Reorientation, Equipment Evaluation, Education, & procurement, Self-Care / ADL, Balance Training, Gait Training, Home Management Training, Functional Mobility Training, Safety Education & Training, Positioning     [x] Continue with current OT Plan of care.   [] Prepare for Discharge     DISCHARGE RECOMMENDATIONS  Recommended DME: extended tub bench    Post Discharge Care:   []Home Independently  []Home with 24hr Care / Supervision []Home with Partial Supervision []Home with Home Health OT []Home with Out Pt OT []Other: ___   Comments:         Time in Time out Tx Time Breakdown  Variance:   First Session  [id number] [] Individual Tx-  [x] Concurrent Tx -45   [] Co-Tx -   [] Group Tx -   [] Time Missed -     Second Session [id number] [] Individual Tx-   [x] Concurrent Tx -45  [] Co-Tx -   [] Group Tx -   [] Time Missed -     Third Session  1315 1400 [x] Individual Tx- 45  [] Concurrent Tx -  [] Co-Tx -   [] Group Tx -   [] Time Missed -         Total Tx Time: [address], [facility] O[facility]/L Monika Mckinley 157 OTR/L [id number]

## 2021-03-11 NOTE — PROGRESS NOTES
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. Ismael Palmer on 2021. He was not felt to be a candidate for t-PA. MRI brain not able to be done due to patient's BMI. CT perfusion showed small region of ischemic penumbra in the distribution of the R HEMA. Mobile unit MRI showed findings consistent with acute infarct in the right middle cerebral artery. He was put on antiplatelet agents. Social:  Pt is in town from [facility] with wife, visiting and staying with his daughter and her family ( and 25year old son, all work). In [facility], pt lives with wife in a 2 floor plan with 1 ANITA + 3 steps without HR to main floor. Bedroom and bathroom on first floor. Laundry in basement with full flight to reach. Wife able to provide supervision as needed. Pt may return to daughter's home which is a split level with 1 ANITA + 4 steps with R HR to reach main floor where bedroom and bathroom are located. Prior to admission: Pt ambulated with R SPC, was functionally independent. Initial Evaluation  21 AM    PM Short Term Goals Long Term Goals    Was pt agreeable to Eval/treatment? yes yes yes     Does pt have pain? No c/o pain No c/o pain  No c/o pain     Bed Mobility  Rolling: Cassi  Supine to sit: Cassi  Sit to supine: Cassi  Scooting: Cassi Rolling: Mod I   Supine to sit: Mod I   Sit to supine: Mod I  Scooting: Mod I    (twin bed) NT SBA Mod I   Transfers Sit to stand: Cassi  Stand to sit: Cassi  Stand pivot: Cassi with R LBQC Sit to stand:  Mod I Stand to sit: Mod I  Stand pivot with SBQC with Mod I    Sit to stand: Mod I   Stand to sit: Mod I  Stand pivot with SBQC with Mod I SBA Mod I   Ambulation    75 feet with R LBQC with Cassi (WC follow) 400 feet x1 with R SBQC with Supervision 400 feet x1 with R SBQC with Mod I   150 feet with AAD with  feet with AAD with Mod I   Walking 10 feet on uneven surface TBA 10 feet with R SBQC with Supervision  NT 10 feet with AAD with SBA 10 feet with AAD with Mod I   Wheel Chair Mobility NA NT NT     Car Transfers Cassi with R LBQC Mod I with R SBQC NT SBA Mod I   Stair negotiation: ascended and descended  4 steps with R rail with Cassi 12 steps without rails with Supervision (NR ascending and descending) 12 steps without rails with Supervision (Reciprocal ascending and NR descending) 8 steps with 1 rail with SBA 12 steps with 1 rail with Mod I   Curb Step:   ascended and descended TBA 7.5 inch step with R SBQC and SBA  N/T 7.5 inch step with AAD and SBA 7.5 inch step with AAD and Mod I   Picking up object off the floor TBA Picked up cone with Supervision  NT Will  2lb weight with SB assist Will  4lb weight with Mod I   BLE ROM WNL WNL      BLE Strength RLE grossly 5/5  L hip flexion 3+/5  L knee extension 4/5  L ankle DF/PF 4/5 RLE grossly 5/5  L hip flexion 4/5  L knee extension 5/5  L ankle DF/PF 4/5      Balance  Static and dynamic standing balance Cassi with R LBQC Static and dynamic standing balance SBA with R SBQC      Date Family Teach Completed  Wife present 3/5/21      Is additional Family Teaching Needed?   Y or N Y N      Hindering Progress Hemiparesis Hemiparesis      PT recommended ELOS 3 weeks       Team's Discharge Plan        Therapist at Team Meeting          Therapeutic Exercise:   AM: Single leg cone taps without AD and with CGA, standing on RLE and taping cone with LLE x5 reps   Wide semi-tandem stance with RLE in front > reaching and grabbing cone with LUE > placing cone on platform at chest height x 6 reps and CGA  Picking up and carrying ankle weight with LUE > stepping over 3 quad canes without AD > placing ankle weight on platform at chest height x 4 reps and SBA  PM: Step ups onto gradually increasing height step x 6 reps with SBA  Picking up and carrying ankle weight > side stepping over 3 quad canes > and placing ankle weight into bucket at chest height x 3 reps and supervision   FW/BW/ lateral stepping into agility ladder x 2 reps with supervision    Patient education  Pt educated on sequencing with R SBQC onto curb step     Patient response to education:   Pt verbalized understanding Pt demonstrated skill Pt requires further education in this area   yes yes yes     Additional Comments: Patient seated in [facility] and agreeable to treatment. Patient continues to demonstrate occasional LLE toe-in gait which is more evident as patient becomes fatigued during ambulation. Patient continues to require minor cueing to promote improvements in LLE foot clearance however no LOB noted. Patient exhibits improvements in gait speed and gait pattern with R SBQC as compared to initial evaluation. During cone tap exercise, patient exhibited 1 lateral LOB requiring hands-on assistance to correct balance. Plan to continue incorporating therapeutic exercises with and without AD to promote improvements in balance. PM: Continued plan to incorporate balance exercises without AD. Patient demonstrates improvements in UE mobility based on patients ability to carry/lift increased ankle weights. Patient progressed to ascending stairs in reciprocal pattern. Patient received green dot today per approval from PT and OT. Plan to continue balance training without AD. AM  Time in: 0915  Time out: 1000    PM  Time in: 1515  Time out: 1600    Pt is making good progress toward established Physical Therapy goals. Continue with physical therapy current plan of care.     Magui Zhang, SPT  Ja Champion, PT, DPT  [phone]

## 2021-03-12 VITALS
SYSTOLIC BLOOD PRESSURE: 160 MMHG | BODY MASS INDEX: 45.51 KG/M2 | HEART RATE: 89 BPM | TEMPERATURE: 98.5 F | DIASTOLIC BLOOD PRESSURE: 78 MMHG | WEIGHT: 307.25 LBS | HEIGHT: 69 IN | RESPIRATION RATE: 15 BRPM | OXYGEN SATURATION: 97 %

## 2021-03-12 PROCEDURE — 97535 SELF CARE MNGMENT TRAINING: CPT

## 2021-03-12 PROCEDURE — 6370000000 HC RX 637 (ALT 250 FOR IP): Performed by: INTERNAL MEDICINE

## 2021-03-12 PROCEDURE — 97530 THERAPEUTIC ACTIVITIES: CPT

## 2021-03-12 PROCEDURE — 6370000000 HC RX 637 (ALT 250 FOR IP): Performed by: PHYSICAL MEDICINE & REHABILITATION

## 2021-03-12 PROCEDURE — 6360000002 HC RX W HCPCS: Performed by: INTERNAL MEDICINE

## 2021-03-12 PROCEDURE — 94660 CPAP INITIATION&MGMT: CPT

## 2021-03-12 RX ORDER — SPIRONOLACTONE 50 MG/1
50 TABLET, FILM COATED ORAL DAILY
Qty: 30 TABLET | Refills: 3 | Status: SHIPPED | OUTPATIENT
Start: 2021-03-12

## 2021-03-12 RX ORDER — ASPIRIN 81 MG/1
81 TABLET, CHEWABLE ORAL DAILY
Qty: 30 TABLET | Refills: 3 | Status: SHIPPED | OUTPATIENT
Start: 2021-03-12

## 2021-03-12 RX ORDER — METOPROLOL SUCCINATE 25 MG/1
25 TABLET, EXTENDED RELEASE ORAL DAILY
Qty: 30 TABLET | Refills: 3 | Status: SHIPPED | OUTPATIENT
Start: 2021-03-12

## 2021-03-12 RX ORDER — LOSARTAN POTASSIUM 50 MG/1
50 TABLET ORAL DAILY
Qty: 30 TABLET | Refills: 3 | Status: SHIPPED | OUTPATIENT
Start: 2021-03-12

## 2021-03-12 RX ADMIN — LEVOTHYROXINE SODIUM 75 MCG: 0.07 TABLET ORAL at 06:24

## 2021-03-12 RX ADMIN — ENOXAPARIN SODIUM 40 MG: 40 INJECTION SUBCUTANEOUS at 07:37

## 2021-03-12 RX ADMIN — POTASSIUM CHLORIDE 20 MEQ: 1500 TABLET, EXTENDED RELEASE ORAL at 07:37

## 2021-03-12 RX ADMIN — CLOPIDOGREL 75 MG: 75 TABLET, FILM COATED ORAL at 07:37

## 2021-03-12 RX ADMIN — MESALAMINE 800 MG: 400 CAPSULE, DELAYED RELEASE ORAL at 07:37

## 2021-03-12 RX ADMIN — METOPROLOL SUCCINATE 25 MG: 25 TABLET, EXTENDED RELEASE ORAL at 07:37

## 2021-03-12 RX ADMIN — ASPIRIN 81 MG: 81 TABLET, CHEWABLE ORAL at 07:37

## 2021-03-12 RX ADMIN — LOSARTAN POTASSIUM 50 MG: 50 TABLET, FILM COATED ORAL at 07:37

## 2021-03-12 RX ADMIN — SPIRONOLACTONE 50 MG: 25 TABLET ORAL at 07:37

## 2021-03-12 RX ADMIN — SERTRALINE 50 MG: 50 TABLET, FILM COATED ORAL at 07:37

## 2021-03-12 ASSESSMENT — PAIN SCALES - GENERAL: PAINLEVEL_OUTOF10: 0

## 2021-03-12 NOTE — PROGRESS NOTES
Physical Therapy    Facility/Department: [address] REHAB  Daily Treatment Note    NAME: Yoli Galvan  : 1955  MRN: [id number]    Date of Service: 3/12/2021  Evaluating Therapist: Jennifer Lundy PT, DPT    ROOM: [facility]  DIAGNOSIS: CVA  PRECAUTIONS: falls, L hemiparesis (UE worse than LE), sling, hx oral cancer, hx CVA in 2016 with mild chronic L hemiparesis, hx R meniscus repair in 2016, TSM, general diet, Green dot  HPI: Pt is a 60year old male who developed sudden onset of left hemiparesis and [facility] on 2021. He was not felt to be a candidate for t-PA. MRI brain not able to be done due to patient's BMI. CT perfusion showed small region of ischemic penumbra in the distribution of the R HEMA. Mobile unit MRI showed findings consistent with acute infarct in the right middle cerebral artery. He was put on antiplatelet agents. Social:  Pt is in town from [facility] with wife, visiting and staying with his daughter and her family ( and 25year old son, all work). In [facility], pt lives with wife in a 2 floor plan with 1 ANITA + 3 steps without HR to main floor. Bedroom and bathroom on first floor. Laundry in basement with full flight to reach. Wife able to provide supervision as needed. Pt may return to daughter's home which is a split level with 1 ANITA + 4 steps with R HR to reach main floor where bedroom and bathroom are located. Prior to admission: Pt ambulated with R SPC, was functionally independent. Initial Evaluation  21 AM    Short Term Goals Long Term Goals    Was pt agreeable to Eval/treatment? yes yes     Does pt have pain? No c/o pain No c/o pain      Bed Mobility  Rolling: Cassi  Supine to sit: Cassi  Sit to supine: Cassi  Scooting: Cassi Rolling: Mod I   Supine to sit: Mod I   Sit to supine: Mod I  Scooting: Mod I    (twin bed) SBA Mod I   Transfers Sit to stand: Cassi  Stand to sit: Cassi  Stand pivot: Cassi with R LBQC Sit to stand:  Mod I   Stand to sit: Mod I  Stand pivot with SBQC with Mod I    SBA Mod I   Ambulation    75 feet with R LBQC with Cassi (WC follow) 400 feet x1 with R SBQC with Mod I  150 feet with AAD with  feet with AAD with Mod I   Walking 10 feet on uneven surface TBA 10 feet with R SBQC with Mod I 10 feet with AAD with SBA 10 feet with AAD with Mod I   Wheel Chair Mobility NA NT     Car Transfers Cassi with R LBQC Mod I with R SBQC SBA Mod I   Stair negotiation: ascended and descended  4 steps with R rail with Cassi 12 steps without rails with Supervision (Reciprocal ascending and NR descending) 8 steps with 1 rail with SBA 12 steps with 1 rail with Mod I   Curb Step:   ascended and descended TBA 7.5 inch step with R SBQC and Supervision  7.5 inch step with AAD and SBA 7.5 inch step with AAD and Mod I   Picking up object off the floor TBA Picked up 4lb weight with Mod I  Will  2lb weight with SB assist Will  4lb weight with Mod I   BLE ROM WNL WNL     BLE Strength RLE grossly 5/5  L hip flexion 3+/5  L knee extension 4/5  L ankle DF/PF 4/5 RLE grossly 5/5  L hip flexion 4/5  L knee extension 5/5  L ankle DF/PF 4/5     Balance  Static and dynamic standing balance Cassi with R LBQC Static and dynamic standing balance supervision with R [employer]     Date Family Teach Completed  Wife present 3/5/21 and 3/12/21     Is additional Family Teaching Needed?   Y or N Y N     Hindering Progress Hemiparesis Hemiparesis     PT recommended ELOS 3 weeks      Team's Discharge Plan       Therapist at Team Meeting         Therapeutic Exercise:   AM: Side stepping over 4 quad canes without UE support x 2 reps and supervision   Step ups onto gradually increasing height step x 6 reps with SBA  Picking up and carrying ankle weight > side stepping over 3 quad canes > and placing ankle weight into bucket at chest height x 3 reps and supervision     Patient education  Pt educated on leaning on SBQC before stepping onto 7.5 inch curb step    Patient response to education: Pt verbalized understanding Pt demonstrated skill Pt requires further education in this area   yes yes no     Additional Comments: Patients wife was present for family teach during AM session. Patients wife demonstrated safe guarding technique with all activities and therapist was standing by to provide assistance if necessary. Patient continues to demonstrate consistency with L foot clearance during ambulation with SBQC as well as improvements in LUE mobility. Overall, patient exhibits improvements in physical function and balance and good progression towards goals. AM  Time in: 0830   Time out: 0915    Pt is making good progress toward established Physical Therapy goals. Continue with physical therapy current plan of care.     Margarita Maria, MICHAELA Mcgee, PT, DPT  [phone]

## 2021-03-12 NOTE — PROGRESS NOTES
CLINICAL PHARMACY NOTE: MEDS TO [address] Select Patient?: No  Total # of Prescriptions Filled: 3   The following medications were delivered to the patient:  · COZAAR 50 MG  · ALDACTONE 50 MG  · TOPROL XL 25 MG  Total # of Interventions Completed: 3  Time Spent (min): 15    Additional Documentation:  ASPIRIN 81 MG CHEW- OTC NOT COVERED BY INSURANCE, PT AWARE

## 2021-03-12 NOTE — PROGRESS NOTES
OCCUPATIONAL THERAPY DAILY NOTE/Discharge Summary    Date:3/12/2021  Patient Name: Yoli Galvan  MRN: [id number]  : 1955  Room: [address] [place] [address]   Referring Practitioner: William Vasquez MD  Diagnosis: CVA- RMCA  Additional Pertinent Hx: HLD, HTN, obese & hypothyroidism    Precautions: Fall Risk, L hemiparesis, 3/11 green dot    Functional Assessment:   Date Status AE  Comments   Feeding 3/12/21 Mod I  Pt opened packages and self fed   Grooming 3/12/21 Mod I  Pt stood for hair grooming         Oral Care 3/12/21 Mod I  Pt stood for oral hygiene   Bathing 3/12/21 UB: Mod I  LB:SUP  Full bathing task seated/standing in shower with no physical assist   UB Dressing 3/12/21 Mod I  Pt retrieved shirt and donned over head   LB Dressing 3/12/21 Mod I No AE Pt retrieved pants/underwear and donned IND'ly   Footwear 3/12/21 Mod I  adapted shoe laces Pt retrieved socks/shoes and donned IND'ly   Toileting 3/12/21 Mod I  Pt completed keyon hygiene and pants management with no physical assist   Homemaking 3/5/21 CGA/Min A LBQC      Functional Transfers / Balance:   Date Status DME  Comments   Sit Balance 3/12/21 Mod I     Stand Balance 3/12/21 Mod I Grab bar    [x] Tub        [x] Shower   Transfer 3/12/21        3/12/21 SBA        SUP SBQC, large shower chair Sit down method onto extended tub bench      Step in method into walk in shower   Commode   Transfer 3/12/21 Mod I [facility]    Functional   Mobility 3/12/21 Mod I SBQC To/from bathroom   Other: sit to stand 3/5/21 CGA SBQC      Functional Exercises / Activity:   Pt sitting EOB upon arrival to . Completed functional mobility to shower. Completed ADL, see above for  Assessment. Pt appeared to have tolerated session well and has increased independence with ADLs/met OT goals. Completed family teach during 2nd session, see below.     Sensory / Neuromuscular Re-Education:      Cognitive Skills:   Status Comments   Problem   Solving good    Memory good    Sequencing good    Safety Good- Visual Perception:    Education:  Pt educated in regards to safety with LB bathing for fall prevention    3/1/21: shoe laces adapted for ease with donning shoes  3/12/21: pt given a home exercise program/safety packet    [x] Family teach completed on:  3/12/21: Completed family teach this session. Pt's spouse Kaushal Major present. Educated Kaushal Major on pt's status with ADLs/ functional transfers. Educated on energy conservation techniques and DME. Educated on removal of throw rugs. Pt was given a home exercise program and home safety packet. Educated on forming a routine and engaging in ADLs/IADLs to increase LUE strength/coordination. Pt able to complete functional transfers on hard floor/carpeting, sofa/dining area/bed/commode/tub transfers. Kaushal Major educated on how pt completes light homemaking task in kitchen and assist required with laundry management. Kaushal Moriah able to provide SBA, was on pt's weaker side, and had prior knowledge on safety measures to provide for pt due to pt's prior stroke. Kaushal Major and pt ivyo'ing G understanding and safety of education. Pain Level: 0/10    Additional Notes:   3/2/21- Pt plan of care of updated on this date. Pt tentative length of stay is 2 weeks. Recommend family teach this week. Odette Ni OTR/L [id number]  3/9/21- Pt plan of care of updated on this date. Pt tentative length of stay is 3/12/21. Recommend family teach this week & outpt OT @ Lamine Feliz OTR/L [id number]  3/11/21: collaborated with PT about green dot for pt. Pt demonstrates independence with LB dressing and toileting task and was administered green dot.      2/24/21:    Dynamometer:    R:105# & norm for pt age & gender is 89#    L:38# & norm for pt age & gender is 77#   9 hole peg:    R:36.7 seconds & norm for pt age & gender is 20.9 seconds    L: pt u/a- norm for pt age & gender is 21.6 seconds    3/4/21:    Dynamometer:    R:107.3#    L: 69.7#   9 hole peg:    R: 28.65 secs    L:3 mins 39 secs    Patient has made good  progress during treatment sessions toward set goals. Therapy emphasis to obtain goals:Strengthening, Endurance Training, Neuromuscular Re-education, Patient/Caregiver Education & Training, ROM, Cognitive Reorientation, Equipment Evaluation, Education, & procurement, Self-Care / ADL, Balance Training, Gait Training, Home Management Training, Functional Mobility Training, Safety Education & Training, Positioning     [] Continue with current OT Plan of care. [x] Prepare for Discharge     DISCHARGE RECOMMENDATIONS  OCCUPATIONAL THERAPY DISCHARGE SUMMARY    Discontinue Occupational Therapy intervention. Pt has:   [x] met all set goals. [] made good progress toward set goals. [] has made slow progress toward goals and would benefit from rehab setting change.  [] had a medical decline and therefore was transferred off the Rehab unit. Long term goals  Time Frame for Long term goals : 3-4 weeks to address above problem areas  Long term goal 1: Pt demo Mod I to eat all meals  Long term goal 2: Pt demo Mod I to perfrom grooming seated  Long term goal 3: Pt dmeo SBA-CGA to bathe @ shower level both seated & standing  Long term goal 4: Pt demo s/u UE dress & SBA-CGA to don underwear & patns & s/u to don socks & shoes using AE  Long term goal 5: Pt demo SBA commode trf & SBA-CGA to perfrom toileting & use approrpiate DME to ensure pt safety  Long term goals 6: Pt demo CGA tub trf wtih appropriate DME to ensure pt safety  Long term goal 7: Pt demo Min A light homemaking activity & demo G- safety & insight  Long term goal 8: Pt demo G- endurance for a 30 minute functional activity  Long term goal 9: Pt demo improved FMC & strength as evidenced by gains made by  strength was 38# & norm for pt age & gender is 77#  Long term goal 10:  Pt will tolerated PROM/AAROM/AROM LUE in all planes & positioning to ensure joint integrity    The Patient has received education on:  [x]Transfer Safety [x]Compensatory tech for ADLs []AE training [x]DME training [x]Energy Conservation [x]Home / Kitchen Safety  [x]Fall Prevention  []Other:    Family training was completed: yes    Recommended DME: extended tub bench    Post Discharge Care:   []Home Independently  []Home with 24hr Care / Supervision [x]Home with Partial Supervision []Home with Home Health OT []Home with Out Pt OT []Other: ___   Comments:         Time in Time out Tx Time Breakdown  Variance:   First Session  0745 0830 [x] Individual Tx-45  [] Concurrent Tx -  [] Co-Tx -   [] Group Tx -   [] Time Missed -     Second Session 1000 1030 [x] Individual Tx-30   [] Concurrent Tx -  [] Co-Tx -   [] Group Tx -   [] Time Missed -     Third Session    [] Individual Tx-   [] Concurrent Tx -  [] Co-Tx -   [] Group Tx -   [] Time Missed -         Total Tx Time:  [address], [address] [facility], OTR/L [id number]

## 2021-03-12 NOTE — PLAN OF CARE
Problem: Falls - Risk of:  Goal: Will remain free from falls  Description: Will remain free from falls  Outcome: Completed  Goal: Absence of physical injury  Description: Absence of physical injury  Outcome: Completed     Problem: Skin Integrity:  Goal: Will show no infection signs and symptoms  Description: Will show no infection signs and symptoms  Outcome: Completed  Goal: Absence of new skin breakdown  Description: Absence of new skin breakdown  Outcome: Completed     Problem: Neurological  Goal: Maximum potential motor/sensory/cognitive function  Outcome: Completed     Problem: HEMODYNAMIC STATUS  Goal: Patient has stable vital signs and fluid balance  Outcome: Completed     Problem: ACTIVITY INTOLERANCE/IMPAIRED MOBILITY  Goal: Mobility/activity is maintained at optimum level for patient  Outcome: Completed     Problem: COMMUNICATION IMPAIRMENT  Goal: Ability to express needs and understand communication  Outcome: Completed

## 2021-03-13 NOTE — DISCHARGE SUMMARY
[address]                  [address] [facility],  [address]                               DISCHARGE SUMMARY    PATIENT NAME: Jose Elias Sims                          :        1955  MED REC NO:   [id number]                            ROOM:       5501  ACCOUNT NO:   [de-identified]                           ADMIT DATE: 2021  PROVIDER:     Esther Grullon MD                  DISCHARGE DATE:  2021    INTRODUCTION:  The patient was visiting from [place] when he developed  sudden onset of left hemiparesis and was brought to [facility]. [facility] on  2021. MRI showed a right middle cerebral artery stroke. He was  stabilized and was transferred to acute rehab on 2021. HOSPITAL COURSE:  On admission, the patient was felt to be a good rehab  candidate. He showed excellent progress throughout his stay and  improved to a standby to modified independent level with all of his  functioning. His wife was instructed and he was ready for discharge  home on 2021. His wife is planning to take him back to [place]  the day after discharge, and he will follow up there. Discharge  condition is good. Discharge is to home. LABORATORY DATA:  CBC and metabolic profile were unremarkable. MEDICATIONS:  Aspirin 81 mg daily, Lipitor 40 mg nightly, Plavix 75 mg  daily, Synthroid 75 mcg daily, Cozaar 50 mg daily, mesalamine 800 mg  t.i.d., Toprol-XL 25 mg daily, potassium 20 mEq t.i.d., Zoloft 50 mg  daily, Aldactone 50 mg daily. DIAGNOSES:  1. CVA with left hemiparesis. 2.  Morbid obesity. 3.  Hypertension. 4.  Hyperlipidemia. 5.  Prior CVA with left hemiparesis.         Tabby Velasquez MD    D: 2021 7:32:41       T: 2021 7:42:02     [email or web address]  Job#: [id number]     Doc#: [id number]    CC: